# Patient Record
Sex: MALE | URBAN - METROPOLITAN AREA
[De-identification: names, ages, dates, MRNs, and addresses within clinical notes are randomized per-mention and may not be internally consistent; named-entity substitution may affect disease eponyms.]

---

## 2018-11-02 ENCOUNTER — ON CAMPUS - OUTPATIENT (OUTPATIENT)
Dept: URBAN - METROPOLITAN AREA HOSPITAL 77 | Facility: HOSPITAL | Age: 83
End: 2018-11-02

## 2018-11-02 DIAGNOSIS — R07.9 CHEST PAIN, UNSPECIFIED: ICD-10-CM

## 2018-11-02 DIAGNOSIS — D64.89 OTHER SPECIFIED ANEMIAS: ICD-10-CM

## 2018-11-02 DIAGNOSIS — R93.3 ABNORMAL FINDINGS ON DIAGNOSTIC IMAGING OF OTHER PARTS OF DI: ICD-10-CM

## 2018-11-02 DIAGNOSIS — D69.6 THROMBOCYTOPENIA, UNSPECIFIED: ICD-10-CM

## 2018-11-02 PROCEDURE — 99202 OFFICE O/P NEW SF 15 MIN: CPT | Performed by: INTERNAL MEDICINE

## 2023-08-30 ENCOUNTER — APPOINTMENT (OUTPATIENT)
Dept: CT IMAGING | Facility: HOSPITAL | Age: 88
End: 2023-08-30
Payer: MEDICARE

## 2023-08-30 ENCOUNTER — APPOINTMENT (OUTPATIENT)
Dept: GENERAL RADIOLOGY | Facility: HOSPITAL | Age: 88
End: 2023-08-30
Payer: MEDICARE

## 2023-08-30 ENCOUNTER — HOSPITAL ENCOUNTER (OUTPATIENT)
Facility: HOSPITAL | Age: 88
Setting detail: OBSERVATION
Discharge: NURSING, REHAB OR LONG-TERM CARE | End: 2023-09-05
Attending: EMERGENCY MEDICINE | Admitting: INTERNAL MEDICINE
Payer: MEDICARE

## 2023-08-30 DIAGNOSIS — N39.0 URINARY TRACT INFECTION WITH HEMATURIA, SITE UNSPECIFIED: ICD-10-CM

## 2023-08-30 DIAGNOSIS — R55 SYNCOPE, UNSPECIFIED SYNCOPE TYPE: Primary | ICD-10-CM

## 2023-08-30 DIAGNOSIS — R31.9 URINARY TRACT INFECTION WITH HEMATURIA, SITE UNSPECIFIED: ICD-10-CM

## 2023-08-30 DIAGNOSIS — I60.9 SUBARACHNOID HEMORRHAGE: ICD-10-CM

## 2023-08-30 LAB
ALBUMIN SERPL-MCNC: 3.6 G/DL (ref 3.5–5.2)
ALBUMIN/GLOB SERPL: 1.4 G/DL
ALP SERPL-CCNC: 55 U/L (ref 39–117)
ALT SERPL W P-5'-P-CCNC: <5 U/L (ref 1–41)
ANION GAP SERPL CALCULATED.3IONS-SCNC: 9 MMOL/L (ref 5–15)
APTT PPP: 26.5 SECONDS (ref 61–76.5)
AST SERPL-CCNC: 18 U/L (ref 1–40)
BACTERIA UR QL AUTO: ABNORMAL /HPF
BASOPHILS # BLD AUTO: 0 10*3/MM3 (ref 0–0.2)
BASOPHILS NFR BLD AUTO: 0.6 % (ref 0–1.5)
BILIRUB SERPL-MCNC: 0.4 MG/DL (ref 0–1.2)
BILIRUB UR QL STRIP: NEGATIVE
BUN SERPL-MCNC: 29 MG/DL (ref 8–23)
BUN/CREAT SERPL: 27.1 (ref 7–25)
CALCIUM SPEC-SCNC: 8.7 MG/DL (ref 8.2–9.6)
CHLORIDE SERPL-SCNC: 100 MMOL/L (ref 98–107)
CLARITY UR: CLEAR
CO2 SERPL-SCNC: 31 MMOL/L (ref 22–29)
COLOR UR: YELLOW
CREAT SERPL-MCNC: 1.07 MG/DL (ref 0.76–1.27)
DEPRECATED RDW RBC AUTO: 50.8 FL (ref 37–54)
EGFRCR SERPLBLD CKD-EPI 2021: 63.5 ML/MIN/1.73
EOSINOPHIL # BLD AUTO: 0.1 10*3/MM3 (ref 0–0.4)
EOSINOPHIL NFR BLD AUTO: 1.4 % (ref 0.3–6.2)
ERYTHROCYTE [DISTWIDTH] IN BLOOD BY AUTOMATED COUNT: 14.3 % (ref 12.3–15.4)
GEN 5 2HR TROPONIN T REFLEX: 49 NG/L
GLOBULIN UR ELPH-MCNC: 2.6 GM/DL
GLUCOSE SERPL-MCNC: 146 MG/DL (ref 65–99)
GLUCOSE UR STRIP-MCNC: NEGATIVE MG/DL
HCT VFR BLD AUTO: 36 % (ref 37.5–51)
HGB BLD-MCNC: 12.3 G/DL (ref 13–17.7)
HGB UR QL STRIP.AUTO: NEGATIVE
HYALINE CASTS UR QL AUTO: ABNORMAL /LPF
INR PPP: 1.03 (ref 0.93–1.1)
KETONES UR QL STRIP: ABNORMAL
LEUKOCYTE ESTERASE UR QL STRIP.AUTO: ABNORMAL
LYMPHOCYTES # BLD AUTO: 1.2 10*3/MM3 (ref 0.7–3.1)
LYMPHOCYTES NFR BLD AUTO: 16.8 % (ref 19.6–45.3)
MAGNESIUM SERPL-MCNC: 2.1 MG/DL (ref 1.7–2.3)
MCH RBC QN AUTO: 33.1 PG (ref 26.6–33)
MCHC RBC AUTO-ENTMCNC: 34.3 G/DL (ref 31.5–35.7)
MCV RBC AUTO: 96.5 FL (ref 79–97)
MONOCYTES # BLD AUTO: 0.6 10*3/MM3 (ref 0.1–0.9)
MONOCYTES NFR BLD AUTO: 8.5 % (ref 5–12)
NEUTROPHILS NFR BLD AUTO: 5.2 10*3/MM3 (ref 1.7–7)
NEUTROPHILS NFR BLD AUTO: 72.7 % (ref 42.7–76)
NITRITE UR QL STRIP: NEGATIVE
NRBC BLD AUTO-RTO: 0 /100 WBC (ref 0–0.2)
PH UR STRIP.AUTO: <=5 [PH] (ref 5–8)
PLATELET # BLD AUTO: 159 10*3/MM3 (ref 140–450)
PMV BLD AUTO: 8.8 FL (ref 6–12)
POTASSIUM SERPL-SCNC: 3.8 MMOL/L (ref 3.5–5.2)
PROT SERPL-MCNC: 6.2 G/DL (ref 6–8.5)
PROT UR QL STRIP: NEGATIVE
PROTHROMBIN TIME: 11 SECONDS (ref 9.6–11.7)
RBC # BLD AUTO: 3.73 10*6/MM3 (ref 4.14–5.8)
RBC # UR STRIP: ABNORMAL /HPF
REF LAB TEST METHOD: ABNORMAL
SODIUM SERPL-SCNC: 140 MMOL/L (ref 136–145)
SP GR UR STRIP: 1.02 (ref 1–1.03)
SQUAMOUS #/AREA URNS HPF: ABNORMAL /HPF
TROPONIN T DELTA: -3 NG/L
TROPONIN T SERPL HS-MCNC: 52 NG/L
TSH SERPL DL<=0.05 MIU/L-ACNC: 1.72 UIU/ML (ref 0.27–4.2)
UROBILINOGEN UR QL STRIP: ABNORMAL
WBC # UR STRIP: ABNORMAL /HPF
WBC NRBC COR # BLD: 7.2 10*3/MM3 (ref 3.4–10.8)

## 2023-08-30 PROCEDURE — 96365 THER/PROPH/DIAG IV INF INIT: CPT

## 2023-08-30 PROCEDURE — 85730 THROMBOPLASTIN TIME PARTIAL: CPT | Performed by: EMERGENCY MEDICINE

## 2023-08-30 PROCEDURE — 84484 ASSAY OF TROPONIN QUANT: CPT | Performed by: EMERGENCY MEDICINE

## 2023-08-30 PROCEDURE — 83735 ASSAY OF MAGNESIUM: CPT | Performed by: EMERGENCY MEDICINE

## 2023-08-30 PROCEDURE — 87086 URINE CULTURE/COLONY COUNT: CPT | Performed by: EMERGENCY MEDICINE

## 2023-08-30 PROCEDURE — 99284 EMERGENCY DEPT VISIT MOD MDM: CPT

## 2023-08-30 PROCEDURE — 87040 BLOOD CULTURE FOR BACTERIA: CPT | Performed by: EMERGENCY MEDICINE

## 2023-08-30 PROCEDURE — 84443 ASSAY THYROID STIM HORMONE: CPT | Performed by: EMERGENCY MEDICINE

## 2023-08-30 PROCEDURE — 93005 ELECTROCARDIOGRAM TRACING: CPT

## 2023-08-30 PROCEDURE — G0378 HOSPITAL OBSERVATION PER HR: HCPCS

## 2023-08-30 PROCEDURE — 93005 ELECTROCARDIOGRAM TRACING: CPT | Performed by: EMERGENCY MEDICINE

## 2023-08-30 PROCEDURE — 36415 COLL VENOUS BLD VENIPUNCTURE: CPT

## 2023-08-30 PROCEDURE — 85610 PROTHROMBIN TIME: CPT | Performed by: EMERGENCY MEDICINE

## 2023-08-30 PROCEDURE — 81001 URINALYSIS AUTO W/SCOPE: CPT | Performed by: EMERGENCY MEDICINE

## 2023-08-30 PROCEDURE — 85025 COMPLETE CBC W/AUTO DIFF WBC: CPT | Performed by: EMERGENCY MEDICINE

## 2023-08-30 PROCEDURE — 71045 X-RAY EXAM CHEST 1 VIEW: CPT

## 2023-08-30 PROCEDURE — 70450 CT HEAD/BRAIN W/O DYE: CPT

## 2023-08-30 PROCEDURE — 80053 COMPREHEN METABOLIC PANEL: CPT | Performed by: EMERGENCY MEDICINE

## 2023-08-30 PROCEDURE — 25010000002 CEFTRIAXONE PER 250 MG: Performed by: EMERGENCY MEDICINE

## 2023-08-30 RX ORDER — SODIUM CHLORIDE 0.9 % (FLUSH) 0.9 %
10 SYRINGE (ML) INJECTION EVERY 12 HOURS SCHEDULED
Status: DISCONTINUED | OUTPATIENT
Start: 2023-08-30 | End: 2023-09-05 | Stop reason: HOSPADM

## 2023-08-30 RX ORDER — BISACODYL 10 MG
10 SUPPOSITORY, RECTAL RECTAL DAILY PRN
Status: DISCONTINUED | OUTPATIENT
Start: 2023-08-30 | End: 2023-09-05 | Stop reason: HOSPADM

## 2023-08-30 RX ORDER — AMOXICILLIN 250 MG
2 CAPSULE ORAL 2 TIMES DAILY
Status: DISCONTINUED | OUTPATIENT
Start: 2023-08-30 | End: 2023-09-05 | Stop reason: HOSPADM

## 2023-08-30 RX ORDER — CARBOXYMETHYLCELLULOSE SODIUM 5 MG/ML
1 SOLUTION/ DROPS OPHTHALMIC 2 TIMES DAILY
COMMUNITY

## 2023-08-30 RX ORDER — SODIUM CHLORIDE 0.9 % (FLUSH) 0.9 %
10 SYRINGE (ML) INJECTION AS NEEDED
Status: DISCONTINUED | OUTPATIENT
Start: 2023-08-30 | End: 2023-09-05 | Stop reason: HOSPADM

## 2023-08-30 RX ORDER — LEVOTHYROXINE SODIUM 0.07 MG/1
75 TABLET ORAL DAILY
COMMUNITY

## 2023-08-30 RX ORDER — CARBIDOPA AND LEVODOPA 25; 100 MG/1; MG/1
1 TABLET, EXTENDED RELEASE ORAL 3 TIMES DAILY
COMMUNITY

## 2023-08-30 RX ORDER — SODIUM CHLORIDE 9 MG/ML
40 INJECTION, SOLUTION INTRAVENOUS AS NEEDED
Status: DISCONTINUED | OUTPATIENT
Start: 2023-08-30 | End: 2023-09-05 | Stop reason: HOSPADM

## 2023-08-30 RX ORDER — POLYETHYLENE GLYCOL 3350 17 G/17G
17 POWDER, FOR SOLUTION ORAL DAILY PRN
Status: DISCONTINUED | OUTPATIENT
Start: 2023-08-30 | End: 2023-09-05 | Stop reason: HOSPADM

## 2023-08-30 RX ORDER — ROSUVASTATIN CALCIUM 10 MG/1
10 TABLET, COATED ORAL DAILY
COMMUNITY

## 2023-08-30 RX ORDER — NITROGLYCERIN 0.4 MG/1
0.4 TABLET SUBLINGUAL
COMMUNITY

## 2023-08-30 RX ORDER — TAMSULOSIN HYDROCHLORIDE 0.4 MG/1
1 CAPSULE ORAL DAILY
COMMUNITY

## 2023-08-30 RX ORDER — DUTASTERIDE 0.5 MG/1
0.5 CAPSULE, LIQUID FILLED ORAL DAILY
COMMUNITY

## 2023-08-30 RX ORDER — COLESEVELAM 180 1/1
625 TABLET ORAL 2 TIMES DAILY WITH MEALS
COMMUNITY

## 2023-08-30 RX ORDER — ISOSORBIDE MONONITRATE 60 MG/1
60 TABLET, EXTENDED RELEASE ORAL DAILY
COMMUNITY

## 2023-08-30 RX ORDER — METOPROLOL SUCCINATE 50 MG/1
50 TABLET, EXTENDED RELEASE ORAL DAILY
COMMUNITY

## 2023-08-30 RX ORDER — ERYTHROMYCIN 5 MG/G
1 OINTMENT OPHTHALMIC NIGHTLY
COMMUNITY

## 2023-08-30 RX ORDER — BISACODYL 5 MG/1
5 TABLET, DELAYED RELEASE ORAL DAILY PRN
Status: DISCONTINUED | OUTPATIENT
Start: 2023-08-30 | End: 2023-09-05 | Stop reason: HOSPADM

## 2023-08-30 RX ORDER — PIMAVANSERIN TARTRATE 34 MG/1
1 CAPSULE ORAL NIGHTLY
COMMUNITY

## 2023-08-30 RX ORDER — FUROSEMIDE 40 MG/1
40 TABLET ORAL DAILY
COMMUNITY

## 2023-08-30 RX ORDER — NITROGLYCERIN 0.4 MG/1
0.4 TABLET SUBLINGUAL
Status: DISCONTINUED | OUTPATIENT
Start: 2023-08-30 | End: 2023-09-05 | Stop reason: HOSPADM

## 2023-08-30 RX ADMIN — Medication 10 ML: at 23:12

## 2023-08-30 RX ADMIN — CEFTRIAXONE 2000 MG: 2 INJECTION, POWDER, FOR SOLUTION INTRAMUSCULAR; INTRAVENOUS at 19:48

## 2023-08-30 NOTE — ED PROVIDER NOTES
Subjective   History of Present Illness  Chief complaint: Patient is a 89-year-old who came from home.  Per report he was eating lunch.  The caregiver turned around when she turned back to him he was unconscious.  She could not wake him up or revive him.  Patient cannot remember what happened.  He does not remember any feeling of chest pain or palpitations prior to the episode.  He does feel fatigued.  He was transported in route for this unresponsive episode.  Patient states that he has Parkinson's and a 8 coronary stents.    Context:    Duration:    Timing: Acute sudden onset    Severity:    Associated Symptoms:        PCP:  LMP:    Review of Systems   Constitutional:  Positive for fatigue.   Respiratory: Negative.     Cardiovascular: Negative.    Gastrointestinal: Negative.    Musculoskeletal: Negative.    Neurological:  Positive for syncope. Negative for weakness and headaches.     No past medical history on file.    No Known Allergies    No past surgical history on file.    No family history on file.             Objective   Physical Exam  Vitals and nursing note reviewed.   Constitutional:       General: He is not in acute distress.  HENT:      Head: Normocephalic and atraumatic.   Eyes:      Extraocular Movements: Extraocular movements intact.      Pupils: Pupils are equal, round, and reactive to light.   Neck:      Comments: Patient with systolic murmur  Cardiovascular:      Rate and Rhythm: Normal rate.      Heart sounds: Murmur heard.   Pulmonary:      Effort: Pulmonary effort is normal.      Breath sounds: Normal breath sounds.   Abdominal:      Tenderness: There is no abdominal tenderness.   Neurological:      General: No focal deficit present.      Mental Status: He is alert and oriented to person, place, and time.      Cranial Nerves: No cranial nerve deficit.      Sensory: No sensory deficit.      Motor: No weakness.      Coordination: Coordination normal.      Comments: There is some general symmetric  weakness   Psychiatric:         Mood and Affect: Mood normal.       Procedures           ED Course  ED Course as of 08/30/23 2031   Wed Aug 30, 2023   2005 I spoke with  of neurosurgery.  Patient to have repeat study in 4 hours.  He will be admitted for syncope and observed overnight. [LH]      ED Course User Index  [LH] Rashid Ramirezn,            Results for orders placed or performed during the hospital encounter of 08/30/23   Comprehensive Metabolic Panel    Specimen: Blood   Result Value Ref Range    Glucose 146 (H) 65 - 99 mg/dL    BUN 29 (H) 8 - 23 mg/dL    Creatinine 1.07 0.76 - 1.27 mg/dL    Sodium 140 136 - 145 mmol/L    Potassium 3.8 3.5 - 5.2 mmol/L    Chloride 100 98 - 107 mmol/L    CO2 31.0 (H) 22.0 - 29.0 mmol/L    Calcium 8.7 8.2 - 9.6 mg/dL    Total Protein 6.2 6.0 - 8.5 g/dL    Albumin 3.6 3.5 - 5.2 g/dL    ALT (SGPT) <5 1 - 41 U/L    AST (SGOT) 18 1 - 40 U/L    Alkaline Phosphatase 55 39 - 117 U/L    Total Bilirubin 0.4 0.0 - 1.2 mg/dL    Globulin 2.6 gm/dL    A/G Ratio 1.4 g/dL    BUN/Creatinine Ratio 27.1 (H) 7.0 - 25.0    Anion Gap 9.0 5.0 - 15.0 mmol/L    eGFR 63.5 >60.0 mL/min/1.73   Protime-INR    Specimen: Blood   Result Value Ref Range    Protime 11.0 9.6 - 11.7 Seconds    INR 1.03 0.93 - 1.10   aPTT    Specimen: Blood   Result Value Ref Range    PTT 26.5 (L) 61.0 - 76.5 seconds   High Sensitivity Troponin T    Specimen: Blood   Result Value Ref Range    HS Troponin T 52 (H) <15 ng/L   Urinalysis With Culture If Indicated - Urine, Clean Catch    Specimen: Urine, Clean Catch   Result Value Ref Range    Color, UA Yellow Yellow, Straw    Appearance, UA Clear Clear    pH, UA <=5.0 5.0 - 8.0    Specific Gravity, UA 1.016 1.005 - 1.030    Glucose, UA Negative Negative    Ketones, UA Trace (A) Negative    Bilirubin, UA Negative Negative    Blood, UA Negative Negative    Protein, UA Negative Negative    Leuk Esterase, UA Small (1+) (A) Negative    Nitrite, UA Negative Negative     Urobilinogen, UA 0.2 E.U./dL 0.2 - 1.0 E.U./dL   Magnesium    Specimen: Blood   Result Value Ref Range    Magnesium 2.1 1.7 - 2.3 mg/dL   TSH    Specimen: Blood   Result Value Ref Range    TSH 1.720 0.270 - 4.200 uIU/mL   CBC Auto Differential    Specimen: Blood   Result Value Ref Range    WBC 7.20 3.40 - 10.80 10*3/mm3    RBC 3.73 (L) 4.14 - 5.80 10*6/mm3    Hemoglobin 12.3 (L) 13.0 - 17.7 g/dL    Hematocrit 36.0 (L) 37.5 - 51.0 %    MCV 96.5 79.0 - 97.0 fL    MCH 33.1 (H) 26.6 - 33.0 pg    MCHC 34.3 31.5 - 35.7 g/dL    RDW 14.3 12.3 - 15.4 %    RDW-SD 50.8 37.0 - 54.0 fl    MPV 8.8 6.0 - 12.0 fL    Platelets 159 140 - 450 10*3/mm3    Neutrophil % 72.7 42.7 - 76.0 %    Lymphocyte % 16.8 (L) 19.6 - 45.3 %    Monocyte % 8.5 5.0 - 12.0 %    Eosinophil % 1.4 0.3 - 6.2 %    Basophil % 0.6 0.0 - 1.5 %    Neutrophils, Absolute 5.20 1.70 - 7.00 10*3/mm3    Lymphocytes, Absolute 1.20 0.70 - 3.10 10*3/mm3    Monocytes, Absolute 0.60 0.10 - 0.90 10*3/mm3    Eosinophils, Absolute 0.10 0.00 - 0.40 10*3/mm3    Basophils, Absolute 0.00 0.00 - 0.20 10*3/mm3    nRBC 0.0 0.0 - 0.2 /100 WBC   High Sensitivity Troponin T 2Hr    Specimen: Blood   Result Value Ref Range    HS Troponin T 49 (H) <15 ng/L    Troponin T Delta -3 >=-4 - <+4 ng/L   Urinalysis, Microscopic Only - Urine, Clean Catch    Specimen: Urine, Clean Catch   Result Value Ref Range    RBC, UA 0-2 (A) None Seen /HPF    WBC, UA 21-30 (A) None Seen /HPF    Bacteria, UA 4+ (A) None Seen /HPF    Squamous Epithelial Cells, UA 0-2 None Seen, 0-2 /HPF    Hyaline Casts, UA 3-6 None Seen /LPF    Methodology Automated Microscopy    ECG 12 Lead Syncope   Result Value Ref Range    QT Interval 430 ms    QTC Interval 465 ms            CT Head Without Contrast    Result Date: 8/30/2023  Impression: 1.Subtle hyperdensity in the left frontal lobe may represent calcifications, although a trace subarachnoid hemorrhage is not completely excluded. Please consider repeat CT in 4 to 6 hours to  document stability. 2.Low-density fluid along the right frontal convexity, measuring 1.2 cm in thickness, is concerning for an old subdural hemorrhage versus subdural hygroma. Electronically Signed: Chemo Vargas, DO  8/30/2023 7:15 PM EDT  Workstation ID: [id number]    XR Chest 1 View    Result Date: 8/30/2023  Impression: Low lung volumes accentuates pulmonary vasculature and cardiomediastinal silhouette. No definite acute cardiopulmonary abnormality. Electronically Signed: Chemo Vargas, DO  8/30/2023 5:50 PM EDT  Workstation ID: [id number]                              Medical Decision Making  Patient was seen and evaluated for the above problem    Differential diagnose includes but is not limited to UTI, cardiogenic syncope, intracerebral hemorrhage    Patient had CT scan reviewed by myself that does show a questionable area of subarachnoid blood in the left frontal lobe.  No shift or any severe abnormality otherwise.  EKG interpreted by myself sinus rhythm LVH rate of 70.  Patient with no active chest pain.  Initial troponin is 52 and then 49.  There is signs of UTI.  He received antibiotics.  Will withhold on aspirin secondary to the possible subarachnoid hemorrhage.  I spoke with neurosurgery who recommended 4-hour repeat study.  Discussed with Anita for the hospitalist who agrees to admit.  Patient will be admitted in the [facility].  Discussed with patient and family.  They verbalized understanding    Problems Addressed:  Subarachnoid hemorrhage: complicated acute illness or injury  Syncope, unspecified syncope type: complicated acute illness or injury  Urinary tract infection with hematuria, site unspecified: complicated acute illness or injury    Amount and/or Complexity of Data Reviewed  Labs: ordered. Decision-making details documented in ED Course.     Details: Reviewed as above  Radiology: ordered and independent interpretation performed.     Details: Reviewed as above  ECG/medicine tests: ordered and  independent interpretation performed.     Details: Interpreted by myself as above    Risk  Prescription drug management.  Decision regarding hospitalization.        Final diagnoses:   None     Syncope  Subarachnoid hemorrhage  UTI  ED Disposition  ED Disposition       None            No follow-up provider specified.       Medication List      No changes were made to your prescriptions during this visit.            Rashid Ramirez DO  08/30/23 2042

## 2023-08-30 NOTE — Clinical Note
Level of Care: Med/Surg [1]   Admitting Physician: PASCALE VERAS [[id number]]   Attending Physician: PASCALE VERAS [[id number]]   Bed Request Comments: sophia

## 2023-08-31 ENCOUNTER — APPOINTMENT (OUTPATIENT)
Dept: CT IMAGING | Facility: HOSPITAL | Age: 88
End: 2023-08-31
Payer: MEDICARE

## 2023-08-31 LAB
ALBUMIN SERPL-MCNC: 3.6 G/DL (ref 3.5–5.2)
ALBUMIN/GLOB SERPL: 1.4 G/DL
ALP SERPL-CCNC: 52 U/L (ref 39–117)
ALT SERPL W P-5'-P-CCNC: 6 U/L (ref 1–41)
ANION GAP SERPL CALCULATED.3IONS-SCNC: 7 MMOL/L (ref 5–15)
AST SERPL-CCNC: 21 U/L (ref 1–40)
BACTERIA SPEC AEROBE CULT: NO GROWTH
BASOPHILS # BLD AUTO: 0 10*3/MM3 (ref 0–0.2)
BASOPHILS NFR BLD AUTO: 0.4 % (ref 0–1.5)
BILIRUB SERPL-MCNC: 0.5 MG/DL (ref 0–1.2)
BUN SERPL-MCNC: 24 MG/DL (ref 8–23)
BUN/CREAT SERPL: 24 (ref 7–25)
CALCIUM SPEC-SCNC: 9 MG/DL (ref 8.2–9.6)
CHLORIDE SERPL-SCNC: 103 MMOL/L (ref 98–107)
CO2 SERPL-SCNC: 33 MMOL/L (ref 22–29)
CREAT SERPL-MCNC: 1 MG/DL (ref 0.76–1.27)
DEPRECATED RDW RBC AUTO: 49 FL (ref 37–54)
EGFRCR SERPLBLD CKD-EPI 2021: 68.9 ML/MIN/1.73
EOSINOPHIL # BLD AUTO: 0.1 10*3/MM3 (ref 0–0.4)
EOSINOPHIL NFR BLD AUTO: 0.9 % (ref 0.3–6.2)
ERYTHROCYTE [DISTWIDTH] IN BLOOD BY AUTOMATED COUNT: 14.3 % (ref 12.3–15.4)
GLOBULIN UR ELPH-MCNC: 2.5 GM/DL
GLUCOSE SERPL-MCNC: 94 MG/DL (ref 65–99)
HCT VFR BLD AUTO: 38.1 % (ref 37.5–51)
HGB BLD-MCNC: 12.8 G/DL (ref 13–17.7)
LYMPHOCYTES # BLD AUTO: 1.3 10*3/MM3 (ref 0.7–3.1)
LYMPHOCYTES NFR BLD AUTO: 12.8 % (ref 19.6–45.3)
MCH RBC QN AUTO: 33.4 PG (ref 26.6–33)
MCHC RBC AUTO-ENTMCNC: 33.7 G/DL (ref 31.5–35.7)
MCV RBC AUTO: 99 FL (ref 79–97)
MONOCYTES # BLD AUTO: 0.8 10*3/MM3 (ref 0.1–0.9)
MONOCYTES NFR BLD AUTO: 7.7 % (ref 5–12)
NEUTROPHILS NFR BLD AUTO: 7.9 10*3/MM3 (ref 1.7–7)
NEUTROPHILS NFR BLD AUTO: 78.2 % (ref 42.7–76)
NRBC BLD AUTO-RTO: 0 /100 WBC (ref 0–0.2)
PLATELET # BLD AUTO: 161 10*3/MM3 (ref 140–450)
PMV BLD AUTO: 9.2 FL (ref 6–12)
POTASSIUM SERPL-SCNC: 3.8 MMOL/L (ref 3.5–5.2)
PROT SERPL-MCNC: 6.1 G/DL (ref 6–8.5)
QT INTERVAL: 430 MS
QTC INTERVAL: 465 MS
RBC # BLD AUTO: 3.85 10*6/MM3 (ref 4.14–5.8)
SODIUM SERPL-SCNC: 143 MMOL/L (ref 136–145)
TROPONIN T SERPL HS-MCNC: 56 NG/L
WBC NRBC COR # BLD: 10.1 10*3/MM3 (ref 3.4–10.8)

## 2023-08-31 PROCEDURE — G0378 HOSPITAL OBSERVATION PER HR: HCPCS

## 2023-08-31 PROCEDURE — 80053 COMPREHEN METABOLIC PANEL: CPT | Performed by: NURSE PRACTITIONER

## 2023-08-31 PROCEDURE — 97162 PT EVAL MOD COMPLEX 30 MIN: CPT

## 2023-08-31 PROCEDURE — 84484 ASSAY OF TROPONIN QUANT: CPT | Performed by: NURSE PRACTITIONER

## 2023-08-31 PROCEDURE — 70450 CT HEAD/BRAIN W/O DYE: CPT

## 2023-08-31 PROCEDURE — 36415 COLL VENOUS BLD VENIPUNCTURE: CPT | Performed by: NURSE PRACTITIONER

## 2023-08-31 PROCEDURE — 96361 HYDRATE IV INFUSION ADD-ON: CPT

## 2023-08-31 PROCEDURE — 97166 OT EVAL MOD COMPLEX 45 MIN: CPT

## 2023-08-31 PROCEDURE — 25010000002 CEFTRIAXONE PER 250 MG: Performed by: NURSE PRACTITIONER

## 2023-08-31 PROCEDURE — 85025 COMPLETE CBC W/AUTO DIFF WBC: CPT | Performed by: NURSE PRACTITIONER

## 2023-08-31 RX ORDER — CARBIDOPA AND LEVODOPA 25; 100 MG/1; MG/1
1 TABLET, EXTENDED RELEASE ORAL 3 TIMES DAILY
Status: DISCONTINUED | OUTPATIENT
Start: 2023-08-31 | End: 2023-09-05 | Stop reason: HOSPADM

## 2023-08-31 RX ORDER — ISOSORBIDE MONONITRATE 60 MG/1
60 TABLET, EXTENDED RELEASE ORAL DAILY
Status: DISCONTINUED | OUTPATIENT
Start: 2023-08-31 | End: 2023-09-05 | Stop reason: HOSPADM

## 2023-08-31 RX ORDER — ERYTHROMYCIN 5 MG/G
1 OINTMENT OPHTHALMIC NIGHTLY
Status: DISCONTINUED | OUTPATIENT
Start: 2023-08-31 | End: 2023-09-05 | Stop reason: HOSPADM

## 2023-08-31 RX ORDER — ROSUVASTATIN CALCIUM 10 MG/1
10 TABLET, COATED ORAL DAILY
Status: DISCONTINUED | OUTPATIENT
Start: 2023-08-31 | End: 2023-09-05 | Stop reason: HOSPADM

## 2023-08-31 RX ORDER — NITROGLYCERIN 0.4 MG/1
0.4 TABLET SUBLINGUAL
Status: DISCONTINUED | OUTPATIENT
Start: 2023-08-31 | End: 2023-08-31 | Stop reason: SDUPTHER

## 2023-08-31 RX ORDER — LEVOTHYROXINE SODIUM 0.07 MG/1
75 TABLET ORAL
Status: DISCONTINUED | OUTPATIENT
Start: 2023-08-31 | End: 2023-09-05 | Stop reason: HOSPADM

## 2023-08-31 RX ORDER — FINASTERIDE 5 MG/1
5 TABLET, FILM COATED ORAL DAILY
Status: DISCONTINUED | OUTPATIENT
Start: 2023-08-31 | End: 2023-09-05 | Stop reason: HOSPADM

## 2023-08-31 RX ORDER — CHOLESTYRAMINE LIGHT 4 G/5.7G
1 POWDER, FOR SUSPENSION ORAL DAILY
Status: DISCONTINUED | OUTPATIENT
Start: 2023-08-31 | End: 2023-09-05 | Stop reason: HOSPADM

## 2023-08-31 RX ORDER — METOPROLOL SUCCINATE 50 MG/1
50 TABLET, EXTENDED RELEASE ORAL DAILY
Status: DISCONTINUED | OUTPATIENT
Start: 2023-08-31 | End: 2023-09-05 | Stop reason: HOSPADM

## 2023-08-31 RX ORDER — TAMSULOSIN HYDROCHLORIDE 0.4 MG/1
0.4 CAPSULE ORAL DAILY
Status: DISCONTINUED | OUTPATIENT
Start: 2023-08-31 | End: 2023-09-05 | Stop reason: HOSPADM

## 2023-08-31 RX ORDER — CARBOXYMETHYLCELLULOSE SODIUM 10 MG/ML
2 GEL OPHTHALMIC 4 TIMES DAILY PRN
Status: DISCONTINUED | OUTPATIENT
Start: 2023-08-31 | End: 2023-09-05 | Stop reason: HOSPADM

## 2023-08-31 RX ORDER — SODIUM CHLORIDE 9 MG/ML
75 INJECTION, SOLUTION INTRAVENOUS CONTINUOUS
Status: DISPENSED | OUTPATIENT
Start: 2023-08-31 | End: 2023-09-01

## 2023-08-31 RX ADMIN — Medication 10 ML: at 22:31

## 2023-08-31 RX ADMIN — SODIUM CHLORIDE 75 ML/HR: 9 INJECTION, SOLUTION INTRAVENOUS at 13:27

## 2023-08-31 RX ADMIN — ERYTHROMYCIN 1 APPLICATION: 5 OINTMENT OPHTHALMIC at 21:46

## 2023-08-31 RX ADMIN — CEFTRIAXONE 2000 MG: 2 INJECTION, POWDER, FOR SOLUTION INTRAMUSCULAR; INTRAVENOUS at 21:46

## 2023-08-31 RX ADMIN — CARBIDOPA AND LEVODOPA 1 TABLET: 25; 100 TABLET, EXTENDED RELEASE ORAL at 21:46

## 2023-08-31 RX ADMIN — SENNOSIDES AND DOCUSATE SODIUM 2 TABLET: 50; 8.6 TABLET ORAL at 21:46

## 2023-08-31 RX ADMIN — LEVOTHYROXINE SODIUM 75 MCG: 0.12 TABLET ORAL at 06:02

## 2023-08-31 NOTE — THERAPY EVALUATION
Patient Name: Jermaine Black  : 1926    MRN: [id number]                              Today's Date: 2023       Admit Date: 2023    Visit Dx:     ICD-10-CM ICD-9-CM   1. Syncope, unspecified syncope type  R55 780.2   2. Subarachnoid hemorrhage  I60.9 430   3. Urinary tract infection with hematuria, site unspecified  N39.0 599.0    R31.9 599.70     Patient Active Problem List   Diagnosis    Syncope     History reviewed. No pertinent past medical history.  History reviewed. No pertinent surgical history.   General Information       Row Name 23 1253          Physical Therapy Time and Intention    Document Type evaluation  -MB     Mode of Treatment physical therapy  -MB       Row Name 23 1253          General Information    Patient Profile Reviewed yes  -MB     Prior Level of Function independent:;all household mobility;community mobility;gait;transfer;ADL's;mod assist:;home management  -MB     Existing Precautions/Restrictions fall  -MB     Barriers to Rehab medically complex  -MB       Row Name 23 1253          Living Environment    People in Home spouse  -MB       Row Name 23 1253          Home Main Entrance    Number of Stairs, Main Entrance two  -MB       Row Name 23 1253          Stairs Within Home, Primary    Number of Stairs, Within Home, Primary none  -MB       Row Name 23 1253          Cognition    Orientation Status (Cognition) oriented x 4  -MB       Row Name 23 1253          Safety Issues, Functional Mobility    Impairments Affecting Function (Mobility) balance;endurance/activity tolerance;motor control;shortness of breath;strength;postural/trunk control  -MB               User Key  (r) = Recorded By, (t) = Taken By, (c) = Cosigned By      Initials Name Provider Type    Servando Francisco, PT Physical Therapist                   Mobility       Row Name 23 1254          Bed Mobility    Bed Mobility bed mobility (all) activities  -MB     All  Activities, [place] (Bed Mobility) moderate assist (50% patient effort);2 person assist  -MB     Assistive Device (Bed Mobility) bed rails;head of bed elevated  -MB       Row Name 08/31/23 1254          Sit-Stand Transfer    Sit-Stand [place] (Transfers) moderate assist (50% patient effort)  -MB     Assistive Device (Sit-Stand Transfers) walker, front-wheeled  -MB       Row Name 08/31/23 1254          Gait/Stairs (Locomotion)    [place] Level (Gait) minimum assist (75% patient effort)  -MB     Assistive Device (Gait) walker, front-wheeled  -MB     Distance in Feet (Gait) 5  -MB     Deviations/Abnormal Patterns (Gait) jeannine decreased;festinating/shuffling;gait speed decreased;stride length decreased;weight shifting decreased  -MB     Comment, (Gait/Stairs) 5' min>mod A with RW, shuffling gait with no decreased step length and jeannine  -MB               User Key  (r) = Recorded By, (t) = Taken By, (c) = Cosigned By      Initials Name Provider Type    MB Servando Hernandez, PT Physical Therapist                   Obj/Interventions       Row Name 08/31/23 1257          Range of Motion Comprehensive    General Range of Motion no range of motion deficits identified  -MB       Row Name 08/31/23 1257          Strength Comprehensive (MMT)    Comment, General Manual Muscle Testing (MMT) Assessment BLE Strength 3+/5 grossly  -MB       Row Name 08/31/23 1257          Balance    Balance Assessment sitting static balance;sitting dynamic balance;sit to stand dynamic balance;standing static balance;standing dynamic balance  -MB     Static Sitting Balance independent  -MB     Dynamic Sitting Balance independent  -MB     Position, Sitting Balance supported;sitting edge of bed  -MB     Sit to Stand Dynamic Balance contact guard  -MB     Static Standing Balance contact guard  -MB     Dynamic Standing Balance minimal assist;moderate assist  -MB       Row Name 08/31/23 1257          Sensory Assessment (Somatosensory)     Sensory Assessment (Somatosensory) sensation intact  -MB               User Key  (r) = Recorded By, (t) = Taken By, (c) = Cosigned By      Initials Name Provider Type    Servando Francisco, PT Physical Therapist                   Goals/Plan       Row Name 08/31/23 1259          Bed Mobility Goal 1 (PT)    Activity/Assistive Device (Bed Mobility Goal 1, PT) bed mobility activities, all  -MB     [place] Level/Cues Needed (Bed Mobility Goal 1, PT) independent  -MB     Time Frame (Bed Mobility Goal 1, PT) long term goal (LTG);2 weeks  -MB       Row Name 08/31/23 1259          Transfer Goal 1 (PT)    Activity/Assistive Device (Transfer Goal 1, PT) transfers, all;walker, rolling  -MB     [place] Level/Cues Needed (Transfer Goal 1, PT) contact guard required  -MB     Time Frame (Transfer Goal 1, PT) long term goal (LTG);2 weeks  -MB       Row Name 08/31/23 1259          Gait Training Goal 1 (PT)    Activity/Assistive Device (Gait Training Goal 1, PT) gait (walking locomotion);walker, rolling  -MB     [place] Level (Gait Training Goal 1, PT) contact guard required  -MB     Distance (Gait Training Goal 1, PT) 25  -MB     Time Frame (Gait Training Goal 1, PT) long term goal (LTG);2 weeks  -MB       Row Name 08/31/23 1259          Therapy Assessment/Plan (PT)    Planned Therapy Interventions (PT) balance training;bed mobility training;gait training;home exercise program;neuromuscular re-education;patient/family education;strengthening;transfer training  -MB               User Key  (r) = Recorded By, (t) = Taken By, (c) = Cosigned By      Initials Name Provider Type    Servando Francisco, PT Physical Therapist                   Clinical Impression       Row Name 08/31/23 1258          Pain    Pretreatment Pain Rating 0/10 - no pain  -MB     Posttreatment Pain Rating 0/10 - no pain  -MB       Row Name 08/31/23 1312 08/31/23 1258       Plan of Care Review    Plan of Care Reviewed With -- patient  -MB    Progress -- no  change  -MB    Outcome Evaluation Pt is a 89 y/o M admitted to [facility] on 8/30/23 following a syncopal episode in which his caregiver found pt unconscious when eating lunch. He was unable to be aroused and and does not remember the incident. Pt was found to have signs of UTI and possible subarachnoid hemorrhage with CT Head (+) for parenchymal calcifications with potential SAH, still req further CT imaging. PMH includes Parkinson's Disease, CAD, hypothyroidism, HLD, HTN, and CHF. He is currently living at home with spouse in [facility] with two step entry. He reports a caregiver comes to the house throughout the week and pt daughter provides transportation for the community. At baseline, pt reports IND with household mobility and ADLs with use of RW. At time of eval pt is sitting up in bed and is AAOx4. He completes bed mobility mod A x 2, transfers mod A, and amb 5' min>mod A. During gait assessment, pt req use of urinal. While holding urinal in standing, significant tremor was noted and pt began drooling from the mouth. Cognitive change was noted and pt was unable to follow commands correctly. He req mod>max A to help reach the EOB where symptoms improved and pt symptoms resided. Orthostatic vital signs would be beneficial to test next session to rule in/out. He is currently not at IND baseline functioning, and will req skilled Pt intervention during stay. Current recommendation is IRF at d/c for quick, intensive rehab to reach IND baseline and return back home with wife.  -MB --      Row Name 08/31/23 1258          Therapy Assessment/Plan (PT)    Rehab Potential (PT) fair, will monitor progress closely  -MB     Criteria for Skilled Interventions Met (PT) yes;meets criteria  -MB     Therapy Frequency (PT) 5 times/wk  -MB     Predicted Duration of Therapy Intervention (PT) until d/c  -MB       Row Name 08/31/23 1258          Vital Signs    Pretreatment Heart Rate (beats/min) 71  -MB     Posttreatment Heart Rate (beats/min) 78   -MB     Pre SpO2 (%) 97  -MB     O2 Delivery Pre Treatment room air  -MB     O2 Delivery Intra Treatment room air  -MB     Post SpO2 (%) 96  -MB     O2 Delivery Post Treatment room air  -MB     Pre Patient Position Supine  -MB     Intra Patient Position Standing  -MB     Post Patient Position Supine  -MB       Row Name 08/31/23 1258          Positioning and Restraints    Pre-Treatment Position in bed  -MB     Post Treatment Position bed  -MB     In Bed notified nsg;supine;call light within reach;encouraged to call for assist  -MB               User Key  (r) = Recorded By, (t) = Taken By, (c) = Cosigned By      Initials Name Provider Type    Servando Francisco, PT Physical Therapist                   Outcome Measures       Row Name 08/31/23 1300          How much help from another person do you currently need...    Turning from your back to your side while in flat bed without using bedrails? 2  -MB     Moving from lying on back to sitting on the side of a flat bed without bedrails? 2  -MB     Moving to and from a bed to a chair (including a wheelchair)? 2  -MB     Standing up from a chair using your arms (e.g., wheelchair, bedside chair)? 2  -MB     Climbing 3-5 steps with a railing? 2  -MB     To walk in hospital room? 2  -MB     AM-PAC 6 Clicks Score (PT) 12  -MB     Highest level of mobility 4 --> Transferred to chair/commode  -MB       Row Name 08/31/23 1300          Functional Assessment    Outcome Measure Options AM-PAC 6 Clicks Basic Mobility (PT)  -MB               User Key  (r) = Recorded By, (t) = Taken By, (c) = Cosigned By      Initials Name Provider Type    Servando Francisco, CADY Physical Therapist                                 Physical Therapy Education       Title: PT OT SLP Therapies (Done)       Topic: Physical Therapy (Done)       Point: Mobility training (Done)       Learning Progress Summary             Patient Acceptance, E,TB, VU by MB at 8/31/2023 1303                         Point: Body mechanics  (Done)       Learning Progress Summary             Patient Acceptance, E,TB, VU by MB at 8/31/2023 1303                         Point: Precautions (Done)       Learning Progress Summary             Patient Acceptance, E,TB, VU by MB at 8/31/2023 1303                                         User Key       Initials Effective Dates Name Provider Type Discipline    MB 06/06/23 -  Servando Hernandez, PT Physical Therapist PT                  PT Recommendation and Plan  Planned Therapy Interventions (PT): balance training, bed mobility training, gait training, home exercise program, neuromuscular re-education, patient/family education, strengthening, transfer training  Plan of Care Reviewed With: patient  Progress: no change  Outcome Evaluation: Pt is a 89 y/o M admitted to [facility] on 8/30/23 following a syncopal episode in which his caregiver found pt unconscious when eating lunch. He was unable to be aroused and and does not remember the incident. Pt was found to have signs of UTI and possible subarachnoid hemorrhage with CT Head (+) for parenchymal calcifications with potential SAH, still req further CT imaging. PMH includes Parkinson's Disease, CAD, hypothyroidism, HLD, HTN, and CHF. He is currently living at home with spouse in [facility] with two step entry. He reports a caregiver comes to the house throughout the week and pt daughter provides transportation for the community. At baseline, pt reports IND with household mobility and ADLs with use of RW. At time of eval pt is sitting up in bed and is AAOx4. He completes bed mobility mod A x 2, transfers mod A, and amb 5' min>mod A. During gait assessment, pt req use of urinal. While holding urinal in standing, significant tremor was noted and pt began drooling from the mouth. Cognitive change was noted and pt was unable to follow commands correctly. He req mod>max A to help reach the EOB where symptoms improved and pt symptoms resided. Orthostatic vital signs would be beneficial to  test next session to rule in/out. He is currently not at IND baseline functioning, and will req skilled Pt intervention during stay. Current recommendation is IRF at d/c for quick, intensive rehab to reach IND baseline and return back home with wife.     Time Calculation:   PT Evaluation Complexity  History, PT Evaluation Complexity: 1-2 personal factors and/or comorbidities  Examination of Body Systems (PT Eval Complexity): total of 3 or more elements  Clinical Presentation (PT Evaluation Complexity): evolving  Clinical Decision Making (PT Evaluation Complexity): moderate complexity  Overall Complexity (PT Evaluation Complexity): moderate complexity     PT Charges       Row Name 08/31/23 1304             Time Calculation    Start Time 1017  -MB      Stop Time 1051  -MB      Time Calculation (min) 34 min  -MB      PT Received On 08/31/23  -MB      PT - Next Appointment 09/01/23  -MB      PT Goal Re-Cert Due Date 09/14/23  -MB         Time Calculation- PT    Total Timed Code Minutes- PT 0 minute(s)  -MB                User Key  (r) = Recorded By, (t) = Taken By, (c) = Cosigned By      Initials Name Provider Type    Servando Francisco, PT Physical Therapist                  Therapy Charges for Today       Code Description Service Date Service Provider Modifiers Qty    [id number] HC PT EVAL MOD COMPLEXITY 4 8/31/2023 Servando Hernandez, PT GP 1            PT G-Codes  Outcome Measure Options: AM-PAC 6 Clicks Basic Mobility (PT)  AM-PAC 6 Clicks Score (PT): 12  PT Discharge Summary  Anticipated Discharge Disposition (PT): inpatient rehabilitation facility    Servando Hernandez PT  8/31/2023

## 2023-08-31 NOTE — PROGRESS NOTES
[facility] Medicine Services   Daily Progress Note    Patient Name: Jermaine Black  : 1926  MRN: [id number]  Primary Care Physician:  Provider, No Known  Date of admission: 2023  Date and Time of Service: 2023 at 0925.      Subjective      Chief Complaint: Patient came in with an syncopal episode.    Patient Reports patient is complaining of generalized weakness but denies any other complaints at this point.    Review of Systems   Constitutional: Negative for chills, fever and night sweats.   HENT:  Negative for congestion, hoarse voice and sore throat.    Eyes:  Negative for blurred vision and double vision.   Cardiovascular:  Positive for syncope. Negative for chest pain, dyspnea on exertion, leg swelling, near-syncope, orthopnea and palpitations.   Respiratory:  Negative for cough, shortness of breath, sputum production and wheezing.    Endocrine: Negative for cold intolerance and heat intolerance.   Skin:  Negative for itching and rash.   Musculoskeletal:  Negative for arthritis, back pain, falls and neck pain.   Gastrointestinal:  Negative for abdominal pain, constipation, diarrhea, hematemesis, hematochezia, melena, nausea and vomiting.   Genitourinary:  Negative for dysuria, frequency, hematuria and urgency.   Neurological:  Positive for weakness. Negative for excessive daytime sleepiness, dizziness, focal weakness, headaches, light-headedness, tremors and vertigo.   Psychiatric/Behavioral:  Negative for altered mental status, depression and hallucinations.           Objective      Vitals:   Temp:  [97.4 °F (36.3 °C)-98.2 °F (36.8 °C)] 97.4 °F (36.3 °C)  Heart Rate:  [70-80] 70  Resp:  [16-18] 17  BP: (144-176)/(62-88) 153/80    Physical Exam  Constitutional:       General: He is not in acute distress.  HENT:      Head: Normocephalic and atraumatic.      Mouth/Throat:      Mouth: Mucous membranes are dry.      Pharynx: Oropharynx is clear.   Eyes:      Extraocular Movements:  Extraocular movements intact.   Cardiovascular:      Rate and Rhythm: Normal rate and regular rhythm.      Pulses: Normal pulses.      Heart sounds: Murmur heard.      Comments: Patient has a 2/6 systolic murmur at the left lower sternal border.  Pulmonary:      Effort: Pulmonary effort is normal.      Breath sounds: Normal breath sounds.   Abdominal:      Palpations: Abdomen is soft.      Tenderness: There is no abdominal tenderness. There is no rebound.   Musculoskeletal:      Right lower leg: No edema.      Left lower leg: No edema.   Skin:     General: Skin is warm and dry.   Neurological:      General: No focal deficit present.      Mental Status: He is alert.   Psychiatric:         Mood and Affect: Mood normal.         Behavior: Behavior normal.             Result Review    Result Review:  I have personally reviewed the results from the time of this admission to 8/31/2023 09:24 EDT and agree with these findings:  [x]  Laboratory  []  Microbiology  [x]  Radiology  []  EKG/Telemetry   []  Cardiology/Vascular   []  Pathology  []  Old records  []  Other:  Most notable findings include:   CBC shows WBC of 10.1, H&H of 12.8 and 38.1 and platelets of 161.  CMP shows sodium of 143, potassium 3.8, chloride 103, carbon dioxide 33, BUN 24, creatinine 1.2 and glucose of 94.  LFTs were within normal limits.  Troponin was initially 49 and increased to 56.    CT of the head without contrast:  1. Stable 3-4 mm subtle hyperdensity adjacent to the left superior frontal gyrus. In the absence of trauma, this may represent a parenchymal calcification. Otherwise, this still could represent a small focus of subarachnoid hemorrhage. There do not   appear to be significant external signs of trauma to the head. Consider 24-hour follow-up head CT.  2. Moderate chronic small vessel ischemic change and moderate generalized parenchymal volume loss.      Assessment & Plan      Brief Patient Summary:  Jermaine Black is a 89 y.o. male who  came in with an episode of syncope and is found to have some abnormality on the CT of the head.      carbidopa-levodopa ER, 1 tablet, Oral, TID  cefTRIAXone, 2,000 mg, Intravenous, Q24H  cholestyramine light, 1 packet, Oral, Daily  erythromycin, 1 application , Both Eyes, Nightly  finasteride, 5 mg, Oral, Daily  isosorbide mononitrate, 60 mg, Oral, Daily  levothyroxine, 75 mcg, Oral, Q AM  metoprolol succinate XL, 50 mg, Oral, Daily  rosuvastatin, 10 mg, Oral, Daily  senna-docusate sodium, 2 tablet, Oral, BID  sodium chloride, 10 mL, Intravenous, Q12H  tamsulosin, 0.4 mg, Oral, Daily       sodium chloride, 75 mL/hr         Active Hospital Problems:  Active Hospital Problems    Diagnosis     **Syncope      Plan:   #Brief episode of unconsciousness, syncopal event  - pt now back to baseline  - neg for fall   - Likely secondary to some dehydration due to his diuresis.  - rule out neurological such as TIA or seizure  - Initial CT concerning for SAH. Repeat CT stable but still with questionable area  -Repeat CT of the head without contrast at 6 PM this evening.  - neurology consulted and case was discussed by ED physician   - We will consult neurosurgery as well for possible SAH.  - VSS  - troponin 52-> 49  - Electrolytes WNL  - No leukocytosis  - cultures pending  - PT/OT    #Possible dehydration:  -Lasix is on hold.  -Give gentle hydration for liter of fluid.  -Check orthostatic blood pressure and pulse.  -PT eval.     #Mild UTI   - continue rocephin till tomorrow and then stop.  - follow cultures     Hypothyroid  - continue synthroid     CAD s/p multiple stents/ HLD  -Troponin slightly elevated on the last reading but patient without any chest pain shortness of breath or any other signs and symptoms of acute coronary syndrome.  - continue imdur, toprol, crestor  - will hold lasix for possible volume depletion            DVT prophylaxis:  Mechanical DVT prophylaxis orders are present.     CODE STATUS:    Medical  Intervention Limits: NO intubation (DNI)  Code Status (Patient has no pulse and is not breathing): No CPR (Do Not Attempt to Resuscitate)  Medical Interventions (Patient has pulse or is breathing): Limited Support       DVT prophylaxis:  Mechanical DVT prophylaxis orders are present.    CODE STATUS:    Medical Intervention Limits: NO intubation (DNI)  Code Status (Patient has no pulse and is not breathing): No CPR (Do Not Attempt to Resuscitate)  Medical Interventions (Patient has pulse or is breathing): Limited Support      Disposition:  I expect patient to be discharged tomorrow.      Electronically signed by Missy Carlson MD, 08/31/23, 09:24 EDT.  [facility] Hospitalist Team

## 2023-08-31 NOTE — PLAN OF CARE
Goal Outcome Evaluation:  Plan of Care Reviewed With: patient        Progress: no change   Pt is a 89 y/o M admitted to [facility] on 8/30/23 following a syncopal episode in which his caregiver found pt unconscious when eating lunch. He was unable to be aroused and and does not remember the incident. Pt was found to have signs of UTI and possible subarachnoid hemorrhage with CT Head (+) for parenchymal calcifications with potential SAH, still req further CT imaging. PMH includes Parkinson's Disease, CAD, hypothyroidism, HLD, HTN, and CHF. He is currently living at home with spouse in [facility] with two step entry. He reports a caregiver comes to the house throughout the week and pt daughter provides transportation for the community. At baseline, pt reports IND with household mobility and ADLs with use of RW. At time of eval pt is sitting up in bed and is AAOx4. He completes bed mobility mod A x 2, transfers mod A, and amb 5' min>mod A. During gait assessment, pt req use of urinal. While holding urinal in standing, significant tremor was noted and pt began drooling from the mouth. Cognitive change was noted and pt was unable to follow commands correctly. He req mod>max A to help reach the EOB where symptoms improved and pt symptoms resided. Orthostatic vital signs would be beneficial to test next session to rule in/out. He is currently not at IND baseline functioning, and will req skilled Pt intervention during stay. Current recommendation is IRF at d/c for quick, intensive rehab to reach IND baseline and return back home with wife.    Anticipated Discharge Disposition (PT): inpatient rehabilitation facility

## 2023-08-31 NOTE — DISCHARGE PLACEMENT REQUEST
"Jermaine Black (89 y.o. Male)       Date of Birth   11/30/1926    Social Security Number       Address   [address] [place] [address]    Home Phone   [phone]    MRN   [id number]       Jain   None    Marital Status                               Admission Date   8/30/23    Admission Type   Emergency    Admitting Provider   Genna Perez MD    Attending Provider   Missy Carlson MD    Department, Room/Bed   [facility] EMERGENCY DEPARTMENT, 25/25       Discharge Date       Discharge Disposition       Discharge Destination                                 Attending Provider: Missy Carlson MD    Allergies: No Known Allergies    Isolation: None   Infection: None   Code Status: No CPR    Ht: 167.6 cm (66\")   Wt: 86.2 kg (190 lb)    Admission Cmt: None   Principal Problem: Syncope [R55]                   Active Insurance as of 8/30/2023       Primary Coverage       Payor Plan Insurance Group Employer/Plan Group    AETNA MEDICARE REPLACEMENT AETNA MEDICARE REPLACEMENT [id number]       Payor Plan Address Payor Plan Phone Number Payor Plan Fax Number Effective Dates    [address] [phone]  1/1/2022 - None Entered    [facility] [id number]         Subscriber Name Subscriber Birth Date Member ID       JERMAINE BLACK 11/30/1926 [id number]                     Emergency Contacts        (Rel.) Home Phone Work Phone Mobile Phone    Wayne-Rachel Valenzuela SIN (Daughter) -- -- [phone]    Gerri Walker (Daughter) -- -- [phone]    Skylar Rutledge (Daughter) [phone] -- --                "

## 2023-08-31 NOTE — H&P
[facility] Medicine Services  History & Physical    Patient Name: Jermaine Black  : 1926  MRN: [id number]  Primary Care Physician:  Maria De Jesus, No Known  Date of admission: 2023  Date and Time of Service: 23 at 2100    Subjective      Chief Complaint: syncope    History of Present Illness: Jermaine Black is a 89 y.o. male with pmh hypothyroidism, Parkinson's, coronary artery disease, hyperlipidemia, GERD, BPH, hypertension, congestive heart failure who presented to [facility] on 2023 following a syncopal event.    Family not present at bedside.  Patient had been accompanied to the ED by roxanne.  Patient reports resides with his spouse.  Patient alert but poor historian.  Reports does not recall events leading up to hospitalization.  Her ED physician patient was admitted from home.  While eating lunch caregiver turned around and when she turned back he was unconscious.  Reportedly unable to arouse and EMS called.     In the ED patient was found to have signs of UTI.  CT scan did show a questionable area of subarachnoid blood in the left frontal lobe.  Repeat scan shows a stable 3 to 4 mm subtle hyperdensity adjacent to the left superior frontal gyrus.  In the absence of trauma may represent parenchymal calcification.  Otherwise this could still represent a small focus of subarachnoid hemorrhage.  Consider 24-hour follow-up CT head.  Neurology is following.  EKG interpreted by ED physician sinus rhythm LVH rate of 70.  No chest pain.  Initial troponin was 52 then 49.  Aspirin was placed on hold for possible subarachnoid hemorrhage.  Patient is now alert with no concerns.  Denies headache visual changes chest pain shortness of breath abdominal pain nausea or vomiting.  Been admitted for further evaluation  .    Review of Systems   All other systems reviewed and are negative.     Personal History     History reviewed. No pertinent past medical history.    History  reviewed. No pertinent surgical history.    Family History: family history is not on file. Otherwise pertinent FHx was reviewed and not pertinent to current issue.    Social History:  reports that he has never smoked. He has never used smokeless tobacco. He reports that he does not drink alcohol and does not use drugs.    Home Medications:  Prior to Admission Medications       Prescriptions Last Dose Informant Patient Reported? Taking?    carbidopa-levodopa ER (SINEMET CR)  MG per tablet   Yes Yes    Take 1 tablet by mouth 3 (Three) Times a Day.    carboxymethylcellulose (REFRESH PLUS) 0.5 % solution   Yes Yes    Administer 1 drop to both eyes 2 (Two) Times a Day.    colesevelam (WELCHOL) 625 MG tablet   Yes Yes    Take 1 tablet by mouth 2 (Two) Times a Day With Meals.    dutasteride (AVODART) 0.5 MG capsule   Yes Yes    Take 1 capsule by mouth Daily.    erythromycin (ROMYCIN) 5 MG/GM ophthalmic ointment   Yes Yes    Administer 1 application  to both eyes Every Night.    furosemide (LASIX) 40 MG tablet   Yes Yes    Take 1 tablet by mouth Daily.    isosorbide mononitrate (IMDUR) 60 MG 24 hr tablet   Yes Yes    Take 1 tablet by mouth Daily.    levothyroxine (SYNTHROID, LEVOTHROID) 75 MCG tablet   Yes Yes    Take 1 tablet by mouth Daily.    metoprolol succinate XL (TOPROL-XL) 50 MG 24 hr tablet   Yes Yes    Take 1 tablet by mouth Daily.    nitroglycerin (NITROSTAT) 0.4 MG SL tablet   Yes Yes    Place 1 tablet under the tongue Every 5 (Five) Minutes As Needed for Chest Pain. Take no more than 3 doses in 15 minutes.    Pimavanserin Tartrate (Nuplazid) 34 MG capsule   Yes Yes    Take 1 capsule by mouth Every Night.    rosuvastatin (CRESTOR) 10 MG tablet   Yes Yes    Take 1 tablet by mouth Daily.    tamsulosin (FLOMAX) 0.4 MG capsule 24 hr capsule   Yes Yes    Take 1 capsule by mouth Daily.              Allergies:  No Known Allergies    Objective      Vitals:   Temp:  [98 °F (36.7 °C)-98.2 °F (36.8 °C)] 98 °F (36.7  °C)  Heart Rate:  [70-80] 70  Resp:  [16-18] 16  BP: (144-176)/(62-88) 145/77    Physical Exam  Eyes:      Comments: Left eye red and weepy   Cardiovascular:      Rate and Rhythm: Normal rate and regular rhythm.   Pulmonary:      Effort: Pulmonary effort is normal.      Breath sounds: Normal breath sounds.   Abdominal:      General: Abdomen is flat.      Palpations: Abdomen is soft.   Musculoskeletal:         General: Normal range of motion.   Skin:     General: Skin is warm and dry.   Neurological:      Mental Status: He is alert and oriented to person, place, and time.   Psychiatric:         Behavior: Behavior normal.        Result Review    Result Review:  I have personally reviewed the results from the time of this admission to 8/31/2023 02:55 EDT and agree with these findings:  [x]  Laboratory  [x]  Microbiology  [x]  Radiology  [x]  EKG/Telemetry   []  Cardiology/Vascular   []  Pathology  []  Old records  []  Other:  Most notable findings include: CT Head Without Contrast    Result Date: 8/31/2023  Impression: 1. Stable 3-4 mm subtle hyperdensity adjacent to the left superior frontal gyrus. In the absence of trauma, this may represent a parenchymal calcification. Otherwise, this still could represent a small focus of subarachnoid hemorrhage. There do not appear to be significant external signs of trauma to the head. Consider 24-hour follow-up head CT. 2. Moderate chronic small vessel ischemic change and moderate generalized parenchymal volume loss. Electronically Signed: Erik Baer MD  8/31/2023 12:18 AM EDT  Workstation ID: [id number]    CT Head Without Contrast    Result Date: 8/30/2023  Impression: 1.Subtle hyperdensity in the left frontal lobe may represent calcifications, although a trace subarachnoid hemorrhage is not completely excluded. Please consider repeat CT in 4 to 6 hours to document stability. 2.Low-density fluid along the right frontal convexity, measuring 1.2 cm in thickness, is concerning  for an old subdural hemorrhage versus subdural hygroma. Electronically Signed: Chemo Vargas, DO  8/30/2023 7:15 PM EDT  Workstation ID: [id number]    XR Chest 1 View    Result Date: 8/30/2023  Impression: Low lung volumes accentuates pulmonary vasculature and cardiomediastinal silhouette. No definite acute cardiopulmonary abnormality. Electronically Signed: Chemo Vargas, DO  8/30/2023 5:50 PM EDT  Workstation ID: [id number]      ECG 12 Lead Syncope   Preliminary Result   HEART RATE= 70  bpm   RR Interval= 856  ms   KS Interval= 188  ms   P Horizontal Axis= -63  deg   P Front Axis= 25  deg   QRSD Interval= 96  ms   QT Interval= 430  ms   QTcB= 465  ms   QRS Axis= -27  deg   T Wave Axis= -14  deg   - ABNORMAL ECG -   Sinus rhythm   Left ventricular hypertrophy   Inferior infarct, old   Electronically Signed By:    Date and Time of Study: 2023-08-30 16:51:03         WBC   Date Value Ref Range Status   08/30/2023 7.20 3.40 - 10.80 10*3/mm3 Final     RBC   Date Value Ref Range Status   08/30/2023 3.73 (L) 4.14 - 5.80 10*6/mm3 Final     Hemoglobin   Date Value Ref Range Status   08/30/2023 12.3 (L) 13.0 - 17.7 g/dL Final     Hematocrit   Date Value Ref Range Status   08/30/2023 36.0 (L) 37.5 - 51.0 % Final     MCV   Date Value Ref Range Status   08/30/2023 96.5 79.0 - 97.0 fL Final     MCH   Date Value Ref Range Status   08/30/2023 33.1 (H) 26.6 - 33.0 pg Final     MCHC   Date Value Ref Range Status   08/30/2023 34.3 31.5 - 35.7 g/dL Final     RDW   Date Value Ref Range Status   08/30/2023 14.3 12.3 - 15.4 % Final     RDW-SD   Date Value Ref Range Status   08/30/2023 50.8 37.0 - 54.0 fl Final     MPV   Date Value Ref Range Status   08/30/2023 8.8 6.0 - 12.0 fL Final     Platelets   Date Value Ref Range Status   08/30/2023 159 140 - 450 10*3/mm3 Final     Neutrophil %   Date Value Ref Range Status   08/30/2023 72.7 42.7 - 76.0 % Final     Lymphocyte %   Date Value Ref Range Status   08/30/2023 16.8 (L) 19.6 - 45.3 %  Final     Monocyte %   Date Value Ref Range Status   08/30/2023 8.5 5.0 - 12.0 % Final     Eosinophil %   Date Value Ref Range Status   08/30/2023 1.4 0.3 - 6.2 % Final     Basophil %   Date Value Ref Range Status   08/30/2023 0.6 0.0 - 1.5 % Final     Neutrophils, Absolute   Date Value Ref Range Status   08/30/2023 5.20 1.70 - 7.00 10*3/mm3 Final     Lymphocytes, Absolute   Date Value Ref Range Status   08/30/2023 1.20 0.70 - 3.10 10*3/mm3 Final     Monocytes, Absolute   Date Value Ref Range Status   08/30/2023 0.60 0.10 - 0.90 10*3/mm3 Final     Eosinophils, Absolute   Date Value Ref Range Status   08/30/2023 0.10 0.00 - 0.40 10*3/mm3 Final     Basophils, Absolute   Date Value Ref Range Status   08/30/2023 0.00 0.00 - 0.20 10*3/mm3 Final     nRBC   Date Value Ref Range Status   08/30/2023 0.0 0.0 - 0.2 /100 WBC Final      CMP          8/30/2023    17:34   CMP   Glucose 146    BUN 29    Creatinine 1.07    EGFR 63.5    Sodium 140    Potassium 3.8    Chloride 100    Calcium 8.7    Total Protein 6.2    Albumin 3.6    Globulin 2.6    Total Bilirubin 0.4    Alkaline Phosphatase 55    AST (SGOT) 18    ALT (SGPT) <5    Albumin/Globulin Ratio 1.4    BUN/Creatinine Ratio 27.1    Anion Gap 9.0       Assessment & Plan        Active Hospital Problems:  Active Hospital Problems    Diagnosis     **Syncope      Plan:   Brief episode of unconsciousness, syncopal event  - pt now back to baseline  - neg for fall   -consider 2/2 UTI  - rule out neurological such as TIA or seizure  - Initial CT concerning for SAH. Repeat CT stable but still with questionable area  - continue to monitor   - neurology consulted and case was discussed by ED physician   - VSS  - troponin 52-> 49  - Electrolytes WNL  - No leukocytosis  - cultures pending  -PT/OT    UTI   - continue rocephin  - follow cultures    Hypothyroid  - continue synthroid    CAD s/p multiple stents/ HLD  - trend troponin  - continue imdur, toprol, crestor  - will hold lasix for  possible volume depletion         DVT prophylaxis:  Mechanical DVT prophylaxis orders are present.    CODE STATUS:    Medical Intervention Limits: NO intubation (DNI)  Code Status (Patient has no pulse and is not breathing): No CPR (Do Not Attempt to Resuscitate)  Medical Interventions (Patient has pulse or is breathing): Limited Support    Admission Status:  I believe this patient meets observation status.    I discussed the patient's findings and my recommendations with patient.    This patient has been examined wearing appropriate Personal Protective Equipment       Signature: Electronically signed by JUSTO Chan, 08/31/23, 02:55 EDT.  [facility] Hospitalist Team

## 2023-08-31 NOTE — THERAPY EVALUATION
Patient Name: Jermaine Black  : 1926    MRN: [id number]                              Today's Date: 2023       Admit Date: 2023    Visit Dx:     ICD-10-CM ICD-9-CM   1. Syncope, unspecified syncope type  R55 780.2   2. Subarachnoid hemorrhage  I60.9 430   3. Urinary tract infection with hematuria, site unspecified  N39.0 599.0    R31.9 599.70     Patient Active Problem List   Diagnosis    Syncope     History reviewed. No pertinent past medical history.  History reviewed. No pertinent surgical history.   General Information       Row Name 23 1408          General Information    Prior Level of Function independent:;all household mobility;min assist:;ADL's  Pt states his CG at times will assist him with dressing or bathing. He uses 2-wheeled walker per his report.  -     Existing Precautions/Restrictions fall  -     Barriers to Rehab cognitive status;previous functional deficit  -       Row Name 23 1408          Living Environment    People in Home spouse;other (see comments)  Pt states he has a CG 2 days week for some amnount of time each day but is not certain and was mildly confused in some of his answers. No cantact on file to confirm.  -       Row Name 23 1408          Home Main Entrance    Number of Stairs, Main Entrance two  -     Stair Railings, Main Entrance railings safe and in good condition  -       Row Name 23 1408          Stairs Within Home, Primary    Number of Stairs, Within Home, Primary none  -       Row Name 23 1408          Cognition    Orientation Status (Cognition) oriented to;person;disoriented to;place;time;situation;verbal cues/prompts needed for orientation  asked if he was at [place], did not know the month  -       Row Name 23 1408          Safety Issues, Functional Mobility    Safety Issues Affecting Function (Mobility) ability to follow commands;at risk behavior observed;awareness of need for assistance;insight into  deficits/self-awareness;judgment;problem-solving;sequencing abilities;safety precaution awareness  -     Impairments Affecting Function (Mobility) balance;cognition;coordination;endurance/activity tolerance;grasp;motor control;postural/trunk control;range of motion (ROM);strength  -               User Key  (r) = Recorded By, (t) = Taken By, (c) = Cosigned By      Initials Name Provider Type     Mikayla Mcneill OT Occupational Therapist                     Mobility/ADL's       Row Name 08/31/23 1417          Bed Mobility    Bed Mobility supine-sit  -     All Activities, [place] (Bed Mobility) 2 person assist;minimum assist (75% patient effort);moderate assist (50% patient effort);verbal cues  -     Assistive Device (Bed Mobility) head of bed elevated;bed rails  -       Row Name 08/31/23 1417          Transfers    Transfers sit-stand transfer;stand-sit transfer  -     Comment, (Transfers) variable skill with sit<>stand  -       Row Name 08/31/23 1417          Sit-Stand Transfer    Sit-Stand [place] (Transfers) moderate assist (50% patient effort);minimum assist (75% patient effort)  -     Assistive Device (Sit-Stand Transfers) walker, front-wheeled  -       Row Name 08/31/23 1417          Stand-Sit Transfer    Stand-Sit [place] (Transfers) minimum assist (75% patient effort);moderate assist (50% patient effort);verbal cues;nonverbal cues (demo/gesture)  -     Assistive Device (Stand-Sit Transfers) walker, front-wheeled  -       Row Name 08/31/23 1417          Functional Mobility    Functional Mobility- Ind. Level 2 person assist required;maximum assist (25% patient effort)  -     Functional Mobility-Distance (Feet) 4  -     Functional Mobility- Comment Pt started to walk away from the bed with min (A) and requested urinal but was not able to void or coorinate using urinal well, then pt  demonstrated AMS with noted things like drooling, not following commands, decreased gait  speed and praxia. Pt was assisted back to bed.  -       Row Name 08/31/23 1417          Activities of Daily Living    BADL Assessment/Intervention lower body dressing;toileting  -[facility] Name 08/31/23 1417          Lower Body Dressing Assessment/Training    [place] Level (Lower Body Dressing) don;socks;dependent (less than 25% patient effort)  -       Row Name 08/31/23 1417          Toileting Assessment/Training    [place] Level (Toileting) toileting skills;dependent (less than 25% patient effort)  -               User Key  (r) = Recorded By, (t) = Taken By, (c) = Cosigned By      Initials Name Provider Type     Mikayla Mcneill, OT Occupational Therapist                   Obj/Interventions       Row Name 08/31/23 1421          Sensory Assessment (Somatosensory)    Sensory Assessment (Somatosensory) sensation intact  -[facility] Name 08/31/23 1421          Vision Assessment/Intervention    Visual Impairment/Limitations unable/difficult to assess  -     Vision Assessment Comment pt has chronic lt eyelid disorder. Appears swolen and blephritis noted.  -       Row Name 08/31/23 1421          Range of Motion Comprehensive    General Range of Motion no range of motion deficits identified  -[facility] Name 08/31/23 1421          Strength Comprehensive (MMT)    Comment, General Manual Muscle Testing (MMT) Assessment BUE 4-/5; BLE 3+/5  -               User Key  (r) = Recorded By, (t) = Taken By, (c) = Cosigned By      Initials Name Provider Type     Mikayla Mcneill, OT Occupational Therapist                   Goals/Plan       Row Name 08/31/23 1429          Bed Mobility Goal 1 (OT)    Activity/Assistive Device (Bed Mobility Goal 1, OT) bed mobility activities, all  -     [place] Level/Cues Needed (Bed Mobility Goal 1, OT) modified independence  -     Time Frame (Bed Mobility Goal 1, OT) 2 weeks  -       Row Name 08/31/23 1429          Transfer Goal 1 (OT)    Activity/Assistive  Device (Transfer Goal 1, OT) transfers, all;walker, rolling  -     Day Level/Cues Needed (Transfer Goal 1, OT) standby assist  -     Time Frame (Transfer Goal 1, OT) 2 weeks  -       Row Name 08/31/23 1429          Toileting Goal 1 (OT)    Activity/Device (Toileting Goal 1, OT) toileting skills, all  -     Day Level/Cues Needed (Toileting Goal 1, OT) set-up required;supervision required  -     Time Frame (Toileting Goal 1, OT) 2 weeks  -       Row Name 08/31/23 1429          Grooming Goal 1 (OT)    Activity/Device (Grooming Goal 1, OT) grooming skills, all  -     Day (Grooming Goal 1, OT) set-up required  -     Time Frame (Grooming Goal 1, OT) 2 weeks  -       Row Name 08/31/23 1429          Therapy Assessment/Plan (OT)    Planned Therapy Interventions (OT) activity tolerance training;adaptive equipment training;BADL retraining;occupation/activity based interventions;ROM/therapeutic exercise;transfer/mobility retraining;patient/caregiver education/training  -               User Key  (r) = Recorded By, (t) = Taken By, (c) = Cosigned By      Initials Name Provider Type     Mikayla Mcneill, OT Occupational Therapist                   Clinical Impression       Row Name 08/31/23 1424          Pain Assessment    Pretreatment Pain Rating 0/10 - no pain  -     Posttreatment Pain Rating 0/10 - no pain  -       Row Name 08/31/23 1424          Plan of Care Review    Plan of Care Reviewed With patient  -     Progress no change  -     Outcome Evaluation 89 y.o. male with pmh hypothyroidism, Parkinson's, coronary artery disease, hyperlipidemia, GERD, BPH, hypertension, congestive heart failure who [facility] following a syncopal event. Pt became unarousable and CG was present, called EMS. Lives with spouse. 2 CT done and note some atypical areas in the left frontal lobe but cause or etiology indeterminate. See radiology notes. Pt was  mildly confused and described his baseline and home functioning but was not certain about some things. There were no contacts on file or family at bedside to cooberate pt self-report that he at times needs min (A) with ADL, that he uses RW, and has CG 2 days/week for IADL. Pt was eager to get OOB and came to sit EOB with mod (A). He stood with min (A) of 2 and then slowly demonstrated AMS and was assisted to sit on the EOB. BP was not checked and some signs appeared like [place] but some signs appeared neurological. His Parkinson's complicates symptom presentation. He appears unsafe to go home. OT recommends IP rehab at this time with the caveat that his case is developing and complex so OT will follow and update d/c rec's as able.  -       Row Name 08/31/23 1424          Therapy Assessment/Plan (OT)    Rehab Potential (OT) good, to achieve stated therapy goals  -     Criteria for Skilled Therapeutic Interventions Met (OT) skilled treatment is necessary  -     Therapy Frequency (OT) 5 times/wk  -     Predicted Duration of Therapy Intervention (OT) until d/c  -       Row Name 08/31/23 1424          Therapy Plan Review/Discharge Plan (OT)    Anticipated Discharge Disposition (OT) inpatient rehabilitation facility  -       Row Name 08/31/23 1424          Vital Signs    O2 Delivery Pre Treatment room air  -     Pre Patient Position Supine  -     Intra Patient Position Standing  -     Post Patient Position Supine  -       Row Name 08/31/23 1420          Positioning and Restraints    Pre-Treatment Position in bed  -     Post Treatment Position bed  -     In Bed notified nsg;fowlers;call light within reach;encouraged to call for assist;exit alarm on  -               User Key  (r) = Recorded By, (t) = Taken By, (c) = Cosigned By      Initials Name Provider Type     Mikayla Mcneill, OT Occupational Therapist                   Outcome Measures       Row Name 08/31/23 1300          How much help from  another person do you currently need...    Turning from your back to your side while in flat bed without using bedrails? 2  -MB     Moving from lying on back to sitting on the side of a flat bed without bedrails? 2  -MB     Moving to and from a bed to a chair (including a wheelchair)? 2  -MB     Standing up from a chair using your arms (e.g., wheelchair, bedside chair)? 2  -MB     Climbing 3-5 steps with a railing? 2  -MB     To walk in hospital room? 2  -MB     AM-PAC 6 Clicks Score (PT) 12  -MB     Highest level of mobility 4 --> Transferred to chair/commode  -MB       Row Name 08/31/23 1430          Modified [place] Scale    Pre-Stroke Modified [place] Scale 2 - Slight disability.  Unable to carry out all previous activities but able to look after own affairs without assistance.  -     Modified [place] Scale 4 - Moderately severe disability.  Unable to walk without assistance, and unable to attend to own bodily needs without assistance.  -       Row Name 08/31/23 1430 08/31/23 1300       Functional Assessment    Outcome Measure Options Modified [place]  - AM-PAC 6 Clicks Basic Mobility (PT)  -MB              User Key  (r) = Recorded By, (t) = Taken By, (c) = Cosigned By      Initials Name Provider Type     Mikayla Mcneill, OT Occupational Therapist    Servando Francisco, PT Physical Therapist                    Occupational Therapy Education       Title: PT OT SLP Therapies (In Progress)       Topic: Occupational Therapy (In Progress)       Point: ADL training (Done)       Description:   Instruct learner(s) on proper safety adaptation and remediation techniques during self care or transfers.   Instruct in proper use of assistive devices.                  Learning Progress Summary             Patient Acceptance, E,TB,D, VU,NR by  at 8/31/2023 1431                         Point: Home exercise program (Not Started)       Description:   Instruct learner(s) on appropriate technique for monitoring, assisting and/or  progressing therapeutic exercises/activities.                  Learner Progress:  Not documented in this visit.              Point: Precautions (Done)       Description:   Instruct learner(s) on prescribed precautions during self-care and functional transfers.                  Learning Progress Summary             Patient Acceptance, E,TB,D, VU,NR by  at 8/31/2023 1431                         Point: Body mechanics (Not Started)       Description:   Instruct learner(s) on proper positioning and spine alignment during self-care, functional mobility activities and/or exercises.                  Learner Progress:  Not documented in this visit.                              User Key       Initials Effective Dates Name Provider Type Discipline     06/16/21 -  Mikayla Mcneill, SMITH Occupational Therapist OT                  OT Recommendation and Plan  Planned Therapy Interventions (OT): activity tolerance training, adaptive equipment training, BADL retraining, occupation/activity based interventions, ROM/therapeutic exercise, transfer/mobility retraining, patient/caregiver education/training  Therapy Frequency (OT): 5 times/wk  Plan of Care Review  Plan of Care Reviewed With: patient  Progress: no change  Outcome Evaluation: 89 y.o. male with pmh hypothyroidism, Parkinson's, coronary artery disease, hyperlipidemia, GERD, BPH, hypertension, congestive heart failure who presented to [facility] on 8/30/2023 following a syncopal event. Pt became unarousable and CG was present, called EMS. Lives with spouse. 2 CT done and note some atypical areas in the left frontal lobe but cause or etiology indeterminate. See radiology notes. Pt was mildly confused and described his baseline and home functioning but was not certain about some things. There were no contacts on file or family at bedside to cooberate pt self-report that he at times needs min (A) with ADL, that he uses RW, and has CG 2 days/week for IADL. Pt was eager  to get OOB and came to sit EOB with mod (A). He stood with min (A) of 2 and then slowly demonstrated AMS and was assisted to sit on the EOB. BP was not checked and some signs appeared like [place] but some signs appeared neurological. His Parkinson's complicates symptom presentation. He appears unsafe to go home. OT recommends IP rehab at this time with the caveat that his case is developing and complex so OT will follow and update d/c rec's as able.     Time Calculation:   Evaluation Complexity (OT)  Review Occupational Profile/Medical/Therapy History Complexity: expanded/moderate complexity  Assessment, Occupational Performance/Identification of Deficit Complexity: 5 or more performance deficits  Clinical Decision Making Complexity (OT): detailed assessment/moderate complexity  Overall Complexity of Evaluation (OT): moderate complexity     Time Calculation- OT       Row Name 08/31/23 1432             Time Calculation-     OT Start Time 1016  -      OT Stop Time 1047  -      OT Time Calculation (min) 31 min  -      Total Timed Code Minutes- OT 0 minute(s)  -      OT Received On 08/31/23  -      OT - Next Appointment 09/01/23  -      OT Goal Re-Cert Due Date 09/14/23  -                User Key  (r) = Recorded By, (t) = Taken By, (c) = Cosigned By      Initials Name Provider Type     Mikayla Mcneill OT Occupational Therapist                  Therapy Charges for Today       Code Description Service Date Service Provider Modifiers Qty    [id number]  OT EVAL MOD COMPLEXITY 4 8/31/2023 Mikayla Mcneill OT GO 1                 Mikayla Mcneill OT  8/31/2023

## 2023-08-31 NOTE — CONSULTS
[facility] NEUROSURGERY CONSULT NOTE    Patient Name: Jermaine Black  Referring Provider: Rashid Vallecillo DO  Reason for Consultation: Questionable subarachnoid left frontal lobe    Patient Care Team:  Provider, No Known as PCP - General    Chief Complaint: Syncopal event    History of Present Illness:  Patient is a 89 y.o.  male with PMH of Parkinson's who presented to [facility] emergency department on 8/30/2023 after suffering a syncopal event. Evidently patient was eating lunch and became unconscious per the caregiver.  There is no reported fall or head trauma.  When patient was unable to be aroused EMS was notified.  ED work-up demonstrated UTI and CT scan did show a questionable area of subarachnoid blood left frontal lobe with serial imaging demonstrating stability.  Neurosurgery was consulted for further evaluation and recommendations.  Upon my evaluation, patient is sitting up in bed getting ready to eat breakfast.  He is alert and oriented.  Patient recalls sitting and eating yesterday and then the next thing he knows he was in the ambulance.  He is denying any headache, dizziness, lateralizing weakness, nausea/vomiting, acute visual changes (chronic visual impairment), speech issues.  He reports that he lives independently with his wife who is 100 years old.  He does report that he has fallen in the past and scraped up his elbows but denies striking his head.       Review of Systems:    Review of Systems   All other systems reviewed and are negative.    Past Medical History:  History reviewed. No pertinent past medical history.    Past Surgical History:  History reviewed. No pertinent surgical history.  Reviewed past surgical history and it is not pertinent to this visit    Family History:  History reviewed. No pertinent family history.  Reviewed past family history and it is not pertinent to this visit    Social History:  Social History     Tobacco Use    Smoking status: Never    Smokeless  tobacco: Never   Vaping Use    Vaping Use: Never used   Substance Use Topics    Alcohol use: Never    Drug use: Never     Reviewed past social history and it is not pertinent to this visit    Allergies:  Patient has no known allergies.    Home Medications:  Prior to Admission medications    Medication Sig Start Date End Date Taking? Authorizing Provider   carbidopa-levodopa ER (SINEMET CR)  MG per tablet Take 1 tablet by mouth 3 (Three) Times a Day.   Yes Michelle Pretty MD   carboxymethylcellulose (REFRESH PLUS) 0.5 % solution Administer 1 drop to both eyes 2 (Two) Times a Day.   Yes Michelle Pretty MD   colesevelam (WELCHOL) 625 MG tablet Take 1 tablet by mouth 2 (Two) Times a Day With Meals.   Yes Michelle Pretty MD   dutasteride (AVODART) 0.5 MG capsule Take 1 capsule by mouth Daily.   Yes Michelle Pretty MD   erythromycin (ROMYCIN) 5 MG/GM ophthalmic ointment Administer 1 application  to both eyes Every Night.   Yes Michelle Pretty MD   furosemide (LASIX) 40 MG tablet Take 1 tablet by mouth Daily.   Yes Michelle Pretty MD   isosorbide mononitrate (IMDUR) 60 MG 24 hr tablet Take 1 tablet by mouth Daily.   Yes Michelle Pretty MD   levothyroxine (SYNTHROID, LEVOTHROID) 75 MCG tablet Take 1 tablet by mouth Daily.   Yes Michelle Pretty MD   metoprolol succinate XL (TOPROL-XL) 50 MG 24 hr tablet Take 1 tablet by mouth Daily.   Yes Michelle Pretty MD   nitroglycerin (NITROSTAT) 0.4 MG SL tablet Place 1 tablet under the tongue Every 5 (Five) Minutes As Needed for Chest Pain. Take no more than 3 doses in 15 minutes.   Yes Michelle Pretty MD   Pimavanserin Tartrate (Nuplazid) 34 MG capsule Take 1 capsule by mouth Every Night.   Yes Michelle Pretty MD   rosuvastatin (CRESTOR) 10 MG tablet Take 1 tablet by mouth Daily.   Yes Michelle Pretty MD   tamsulosin (FLOMAX) 0.4 MG capsule 24 hr capsule Take 1 capsule by mouth Daily.   Yes Maria De Jesus  MD Michelle       Results Review:  Labs:  Recent Results (from the past 24 hour(s))   ECG 12 Lead Syncope    Collection Time: 08/30/23  4:51 PM   Result Value Ref Range    QT Interval 430 ms    QTC Interval 465 ms   Comprehensive Metabolic Panel    Collection Time: 08/30/23  5:34 PM    Specimen: Blood   Result Value Ref Range    Glucose 146 (H) 65 - 99 mg/dL    BUN 29 (H) 8 - 23 mg/dL    Creatinine 1.07 0.76 - 1.27 mg/dL    Sodium 140 136 - 145 mmol/L    Potassium 3.8 3.5 - 5.2 mmol/L    Chloride 100 98 - 107 mmol/L    CO2 31.0 (H) 22.0 - 29.0 mmol/L    Calcium 8.7 8.2 - 9.6 mg/dL    Total Protein 6.2 6.0 - 8.5 g/dL    Albumin 3.6 3.5 - 5.2 g/dL    ALT (SGPT) <5 1 - 41 U/L    AST (SGOT) 18 1 - 40 U/L    Alkaline Phosphatase 55 39 - 117 U/L    Total Bilirubin 0.4 0.0 - 1.2 mg/dL    Globulin 2.6 gm/dL    A/G Ratio 1.4 g/dL    BUN/Creatinine Ratio 27.1 (H) 7.0 - 25.0    Anion Gap 9.0 5.0 - 15.0 mmol/L    eGFR 63.5 >60.0 mL/min/1.73   Protime-INR    Collection Time: 08/30/23  5:34 PM    Specimen: Blood   Result Value Ref Range    Protime 11.0 9.6 - 11.7 Seconds    INR 1.03 0.93 - 1.10   aPTT    Collection Time: 08/30/23  5:34 PM    Specimen: Blood   Result Value Ref Range    PTT 26.5 (L) 61.0 - 76.5 seconds   High Sensitivity Troponin T    Collection Time: 08/30/23  5:34 PM    Specimen: Blood   Result Value Ref Range    HS Troponin T 52 (H) <15 ng/L   Magnesium    Collection Time: 08/30/23  5:34 PM    Specimen: Blood   Result Value Ref Range    Magnesium 2.1 1.7 - 2.3 mg/dL   TSH    Collection Time: 08/30/23  5:34 PM    Specimen: Blood   Result Value Ref Range    TSH 1.720 0.270 - 4.200 uIU/mL   CBC Auto Differential    Collection Time: 08/30/23  5:34 PM    Specimen: Blood   Result Value Ref Range    WBC 7.20 3.40 - 10.80 10*3/mm3    RBC 3.73 (L) 4.14 - 5.80 10*6/mm3    Hemoglobin 12.3 (L) 13.0 - 17.7 g/dL    Hematocrit 36.0 (L) 37.5 - 51.0 %    MCV 96.5 79.0 - 97.0 fL    MCH 33.1 (H) 26.6 - 33.0 pg    MCHC 34.3 31.5  - 35.7 g/dL    RDW 14.3 12.3 - 15.4 %    RDW-SD 50.8 37.0 - 54.0 fl    MPV 8.8 6.0 - 12.0 fL    Platelets 159 140 - 450 10*3/mm3    Neutrophil % 72.7 42.7 - 76.0 %    Lymphocyte % 16.8 (L) 19.6 - 45.3 %    Monocyte % 8.5 5.0 - 12.0 %    Eosinophil % 1.4 0.3 - 6.2 %    Basophil % 0.6 0.0 - 1.5 %    Neutrophils, Absolute 5.20 1.70 - 7.00 10*3/mm3    Lymphocytes, Absolute 1.20 0.70 - 3.10 10*3/mm3    Monocytes, Absolute 0.60 0.10 - 0.90 10*3/mm3    Eosinophils, Absolute 0.10 0.00 - 0.40 10*3/mm3    Basophils, Absolute 0.00 0.00 - 0.20 10*3/mm3    nRBC 0.0 0.0 - 0.2 /100 WBC   Urinalysis With Culture If Indicated - Urine, Clean Catch    Collection Time: 08/30/23  6:23 PM    Specimen: Urine, Clean Catch   Result Value Ref Range    Color, UA Yellow Yellow, Straw    Appearance, UA Clear Clear    pH, UA <=5.0 5.0 - 8.0    Specific Gravity, UA 1.016 1.005 - 1.030    Glucose, UA Negative Negative    Ketones, UA Trace (A) Negative    Bilirubin, UA Negative Negative    Blood, UA Negative Negative    Protein, UA Negative Negative    Leuk Esterase, UA Small (1+) (A) Negative    Nitrite, UA Negative Negative    Urobilinogen, UA 0.2 E.U./dL 0.2 - 1.0 E.U./dL   Urinalysis, Microscopic Only - Urine, Clean Catch    Collection Time: 08/30/23  6:23 PM    Specimen: Urine, Clean Catch   Result Value Ref Range    RBC, UA 0-2 (A) None Seen /HPF    WBC, UA 21-30 (A) None Seen /HPF    Bacteria, UA 4+ (A) None Seen /HPF    Squamous Epithelial Cells, UA 0-2 None Seen, 0-2 /HPF    Hyaline Casts, UA 3-6 None Seen /LPF    Methodology Automated Microscopy    Urine Culture - Urine, Urine, Clean Catch    Collection Time: 08/30/23  6:23 PM    Specimen: Urine, Clean Catch   Result Value Ref Range    Urine Culture No growth    High Sensitivity Troponin T 2Hr    Collection Time: 08/30/23  7:32 PM    Specimen: Blood   Result Value Ref Range    HS Troponin T 49 (H) <15 ng/L    Troponin T Delta -3 >=-4 - <+4 ng/L   Comprehensive Metabolic Panel     Collection Time: 08/31/23  4:28 AM    Specimen: Blood   Result Value Ref Range    Glucose 94 65 - 99 mg/dL    BUN 24 (H) 8 - 23 mg/dL    Creatinine 1.00 0.76 - 1.27 mg/dL    Sodium 143 136 - 145 mmol/L    Potassium 3.8 3.5 - 5.2 mmol/L    Chloride 103 98 - 107 mmol/L    CO2 33.0 (H) 22.0 - 29.0 mmol/L    Calcium 9.0 8.2 - 9.6 mg/dL    Total Protein 6.1 6.0 - 8.5 g/dL    Albumin 3.6 3.5 - 5.2 g/dL    ALT (SGPT) 6 1 - 41 U/L    AST (SGOT) 21 1 - 40 U/L    Alkaline Phosphatase 52 39 - 117 U/L    Total Bilirubin 0.5 0.0 - 1.2 mg/dL    Globulin 2.5 gm/dL    A/G Ratio 1.4 g/dL    BUN/Creatinine Ratio 24.0 7.0 - 25.0    Anion Gap 7.0 5.0 - 15.0 mmol/L    eGFR 68.9 >60.0 mL/min/1.73   CBC Auto Differential    Collection Time: 08/31/23  4:28 AM    Specimen: Blood   Result Value Ref Range    WBC 10.10 3.40 - 10.80 10*3/mm3    RBC 3.85 (L) 4.14 - 5.80 10*6/mm3    Hemoglobin 12.8 (L) 13.0 - 17.7 g/dL    Hematocrit 38.1 37.5 - 51.0 %    MCV 99.0 (H) 79.0 - 97.0 fL    MCH 33.4 (H) 26.6 - 33.0 pg    MCHC 33.7 31.5 - 35.7 g/dL    RDW 14.3 12.3 - 15.4 %    RDW-SD 49.0 37.0 - 54.0 fl    MPV 9.2 6.0 - 12.0 fL    Platelets 161 140 - 450 10*3/mm3    Neutrophil % 78.2 (H) 42.7 - 76.0 %    Lymphocyte % 12.8 (L) 19.6 - 45.3 %    Monocyte % 7.7 5.0 - 12.0 %    Eosinophil % 0.9 0.3 - 6.2 %    Basophil % 0.4 0.0 - 1.5 %    Neutrophils, Absolute 7.90 (H) 1.70 - 7.00 10*3/mm3    Lymphocytes, Absolute 1.30 0.70 - 3.10 10*3/mm3    Monocytes, Absolute 0.80 0.10 - 0.90 10*3/mm3    Eosinophils, Absolute 0.10 0.00 - 0.40 10*3/mm3    Basophils, Absolute 0.00 0.00 - 0.20 10*3/mm3    nRBC 0.0 0.0 - 0.2 /100 WBC   High Sensitivity Troponin T    Collection Time: 08/31/23  4:28 AM    Specimen: Blood   Result Value Ref Range    HS Troponin T 56 (C) <15 ng/L       Radiology:  CT Head Without Contrast    Result Date: 8/31/2023  Impression: 1. Stable 3-4 mm subtle hyperdensity adjacent to the left superior frontal gyrus. In the absence of trauma, this may  represent a parenchymal calcification. Otherwise, this still could represent a small focus of subarachnoid hemorrhage. There do not appear to be significant external signs of trauma to the head. Consider 24-hour follow-up head CT. 2. Moderate chronic small vessel ischemic change and moderate generalized parenchymal volume loss. Electronically Signed: Erik Baer MD  8/31/2023 12:18 AM EDT  Workstation ID: [id number]    CT Head Without Contrast    Result Date: 8/30/2023  Impression: 1.Subtle hyperdensity in the left frontal lobe may represent calcifications, although a trace subarachnoid hemorrhage is not completely excluded. Please consider repeat CT in 4 to 6 hours to document stability. 2.Low-density fluid along the right frontal convexity, measuring 1.2 cm in thickness, is concerning for an old subdural hemorrhage versus subdural hygroma. Electronically Signed: Chemo Vargas DO  8/30/2023 7:15 PM EDT  Workstation ID: [id number]    XR Chest 1 View    Result Date: 8/30/2023  Impression: Low lung volumes accentuates pulmonary vasculature and cardiomediastinal silhouette. No definite acute cardiopulmonary abnormality. Electronically Signed: Chemo Vargas DO  8/30/2023 5:50 PM EDT  Workstation ID: [id number]     Results Review:   I reviewed the patient's new clinical results.  I personally viewed and interpreted the patient's CT scan and serial CT scan demonstrating age-related parenchymal loss with a tiny subtle focus of hyperintensity in the left frontal lobe stable over serial scans.  This likely represents calcification.  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    Vital Signs:   Temp:  [97.4 °F (36.3 °C)-98.2 °F (36.8 °C)] 98 °F (36.7 °C)  Heart Rate:  [70-80] 70  Resp:  [14-18] 14  BP: (143-176)/(62-88) 143/70        Physical Exam:  /70 (BP Location: Right arm, Patient Position: Lying)    "Pulse 70   Temp 98 °F (36.7 °C) (Oral)   Resp 14   Ht 167.6 cm (66\")   Wt 86.2 kg (190 lb)   SpO2 95%   BMI 30.67 kg/m²     General Appearance:  Alert, cooperative, no distress, appropriate for age                             Head:  Normocephalic, without obvious abnormality                              Eyes:  PERRL, EOM's intact, conjunctivae and cornea clear,                               Nose:  Nares symmetrical, septum midline, mucosa pink, clear watery discharge; no sinus tenderness                           Throat:  Lips, tongue, and mucosa are moist, pink, and intact;                               Neck:  Supple; symmetrical, trachea midline, no adenopathy; thyroid: no enlargement, symmetric, no tenderness/mass/nodules                              Back:  Symmetrical, no curvature, ROM normal, no CVA tenderness                             Lungs:  respirations unlabored, no audible wheeze                             Heart:  regular rate & rhythm, S1 and S2 normal                      Abdomen:  Soft, nontender, bowel sounds active all four quadrants          Musculoskeletal:  Tone and strength strong and symmetrical, all extremities; no joint pain or edema                                        Lymphatic:  No adenopathy              Skin/Hair/Nails: Bilateral scabbing and skin tears along elbows                      Neurologic:   Mental Status: The patient is awake, alert and oriented x 3. Recent and remote memory functions are normal. Language is fluent. Speech is clear. The speech is nondysarthric. Fund of knowledge is normal.  Cranial Nerve V: Facial sensation is intact to light touch.  Cranial Nerve VII: Facial movements are symmetric  Cranial Nerve XI: Sternocleidomastoid strength is 5/5 bilaterally with normal shoulder shrug.  Cranial Nerve XII: Midline tongue protrusion without atrophy or fasciculations.  Motor: Tone is normal in all four extremities without fasciculations, atrophy, or myoclonus. " "There are no involuntary movements.   Muscle Strength: 5/5 muscle strength in bilateral upper and bilateral lower extremities. No pronator drift.  Sensory: The sensory examination is normal for light touch bilaterally and symmetrically.  Cerebellar: Finger to nose intact bilaterally. .      Syncope      Patient is a 89-year-old male with history of Parkinson's that presents with questionable subarachnoid hemorrhage versus likely vessel calcification left frontal lobe especially in light of no reported new trauma.  He does have evidence of an older/chronic fluid collection along the right convexity suggestive of a hygroma and given his report of previous falls may represent a sequela of an older head injury.  This does not appear to be causing any significant mass effect or midline shift.  Patient is alert and oriented and appears to be at neurologic baseline.  Intracranial findings not likely felt to be contributing to patient's syncopal episode.   No further imaging warranted as patient is a DNR and absolutely refuses any type of neurosurgical intervention for any life-saving measures should it become necessary.   Further work-up per neurology.       PLAN:     SAH    -Questionable calcification  -Patient DNR with refusal for any neurosurgical intervention should it become necessary  - Further work-up per neurology        I discussed the patient's findings and my recommendations with patient and nursing staff Dr. Grullon.    Rachel Guido, APRN  08/31/23  13:08 EDT    \"Dictated utilizing Dragon dictation\".      "

## 2023-09-01 PROBLEM — G20 PARKINSON'S DISEASE: Status: ACTIVE | Noted: 2023-09-01

## 2023-09-01 PROBLEM — G20.A1 PARKINSON'S DISEASE: Status: ACTIVE | Noted: 2023-09-01

## 2023-09-01 PROBLEM — I10 HTN (HYPERTENSION): Status: ACTIVE | Noted: 2023-09-01

## 2023-09-01 PROBLEM — I25.10 CAD (CORONARY ARTERY DISEASE): Status: ACTIVE | Noted: 2023-09-01

## 2023-09-01 PROBLEM — I50.9 CHF (CONGESTIVE HEART FAILURE): Status: ACTIVE | Noted: 2023-09-01

## 2023-09-01 PROBLEM — E86.0 DEHYDRATION: Status: ACTIVE | Noted: 2023-09-01

## 2023-09-01 PROBLEM — E78.5 HLD (HYPERLIPIDEMIA): Status: ACTIVE | Noted: 2023-09-01

## 2023-09-01 PROCEDURE — G0378 HOSPITAL OBSERVATION PER HR: HCPCS

## 2023-09-01 PROCEDURE — 96361 HYDRATE IV INFUSION ADD-ON: CPT

## 2023-09-01 PROCEDURE — 97530 THERAPEUTIC ACTIVITIES: CPT

## 2023-09-01 PROCEDURE — 97110 THERAPEUTIC EXERCISES: CPT

## 2023-09-01 RX ADMIN — ISOSORBIDE MONONITRATE 60 MG: 60 TABLET, EXTENDED RELEASE ORAL at 09:31

## 2023-09-01 RX ADMIN — LEVOTHYROXINE SODIUM 75 MCG: 0.12 TABLET ORAL at 06:19

## 2023-09-01 RX ADMIN — SENNOSIDES AND DOCUSATE SODIUM 2 TABLET: 50; 8.6 TABLET ORAL at 21:07

## 2023-09-01 RX ADMIN — POLYETHYLENE GLYCOL 3350 17 G: 17 POWDER, FOR SOLUTION ORAL at 21:08

## 2023-09-01 RX ADMIN — CARBIDOPA AND LEVODOPA 1 TABLET: 25; 100 TABLET, EXTENDED RELEASE ORAL at 09:31

## 2023-09-01 RX ADMIN — CARBIDOPA AND LEVODOPA 1 TABLET: 25; 100 TABLET, EXTENDED RELEASE ORAL at 16:55

## 2023-09-01 RX ADMIN — TAMSULOSIN HYDROCHLORIDE 0.4 MG: 0.4 CAPSULE ORAL at 09:31

## 2023-09-01 RX ADMIN — SENNOSIDES AND DOCUSATE SODIUM 2 TABLET: 50; 8.6 TABLET ORAL at 09:31

## 2023-09-01 RX ADMIN — CHOLESTYRAMINE 4 G: 4 POWDER, FOR SUSPENSION ORAL at 08:30

## 2023-09-01 RX ADMIN — CARBIDOPA AND LEVODOPA 1 TABLET: 25; 100 TABLET, EXTENDED RELEASE ORAL at 21:15

## 2023-09-01 RX ADMIN — Medication 10 ML: at 21:08

## 2023-09-01 RX ADMIN — METOPROLOL SUCCINATE 50 MG: 50 TABLET, EXTENDED RELEASE ORAL at 09:31

## 2023-09-01 RX ADMIN — ROSUVASTATIN 10 MG: 10 TABLET, FILM COATED ORAL at 09:31

## 2023-09-01 RX ADMIN — ERYTHROMYCIN 1 APPLICATION: 5 OINTMENT OPHTHALMIC at 21:07

## 2023-09-01 RX ADMIN — Medication 10 ML: at 09:32

## 2023-09-01 RX ADMIN — FINASTERIDE 5 MG: 5 TABLET, FILM COATED ORAL at 09:31

## 2023-09-01 NOTE — THERAPY TREATMENT NOTE
Subjective: Pt agreeable to therapeutic plan of care.    Objective:     Bed mobility - CGA supine>sit EOB  Transfers - Min-A STS x 2 with HHA  Ambulation - 8 feet CGA and Min-A with HHAx1 and other UE holding on to the bed rail    Therapeutic Exercise - 10 Reps B LE AROM unsupported sitting / EOB. Pt completed 2x10 of each of the following exercises sitting EOB: LAQ, Seated Alternating March, Toe Raises, and Heel Raises    Vitals: Tachycardic  HR Before Activity: 70bpm  HR During Activity: 100bpm  HR After Activity: 75bpm    Pain: 4 VAS   Location: R Hip  Intervention for pain: Repositioned, RN notified, Increased Activity, and Therapeutic Presence    Education: Provided education on the importance of mobility in the acute care setting, Verbal/Tactile Cues, Transfer Training, Gait Training, and Energy conservation strategies    Assessment: Jermaine Black presents with functional mobility impairments which indicate the need for skilled intervention. Tolerating session today without incident. Pt progresses well with therapy this session, but still shows some signs of confusion, such as not remember session from previous day. Pt completes bed mobility CGA this session, completes STS transfer min A, and amb 8' CGA>min A with HHA and other UE holding on to bed railing. He amb with festinating gait and decreased step length this session, but overall shows improved activity tolerance. He completes ther ex EOB and was educated on HEP in bed to prevent muscle atrophy in during stay. Will continue to follow and progress as tolerated.     Plan/Recommendations:   High Intensity Therapy recommended post-acute care. This is recommended as therapy feels the patient would require 5-6 days per week, 2-3 hours per day. At this time, inpatient rehabilitation (acute rehab) would be the first choice and SNF would be second.. Pt requires no DME at discharge.     Pt desires Inpatient Rehabilitation placement at discharge. Pt  cooperative; agreeable to therapeutic recommendations and plan of care.         Basic Mobility 6-click:  Rollin = Total, A lot = 2, A little = 3; 4 = None  Supine>Sit:   1 = Total, A lot = 2, A little = 3; 4 = None   Sit>Stand with arms:  1 = Total, A lot = 2, A little = 3; 4 = None  Bed>Chair:   1 = Total, A lot = 2, A little = 3; 4 = None  Ambulate in room:  1 = Total, A lot = 2, A little = 3; 4 = None  3-5 Steps with railin = Total, A lot = 2, A little = 3; 4 = None  Score: 17    Modified Ruddy: 4 = Moderately severe disability (Unable to attend to own bodily needs without assistance, and unable to walk unassisted)     Post-Tx Position: Supine with HOB Elevated, Alarms activated, and Call light and personal items within reach  PPE: gloves

## 2023-09-01 NOTE — PLAN OF CARE
Goal Outcome Evaluation:               Patient is alert with intermittent confusion and disorientation. Per patient's daughter this is patient's baseline. Patient came to ED status post syncopal episode at home where he lost consciousness. Patient is reportedly legally blind secondary to macular degeneration and is hard of hearing at baseline. Incidental finding of uti with 4+ bacteria noted in urinalysis, currently receiving iv rocephin pending sensitivity. No complaints of dysuria at this time. Patient has remained on room air without complaints of shortness of air, pain or discomfort. Call light in reach, electrode placed on call light to assist patient in finding nurse call button on call light.

## 2023-09-01 NOTE — PROGRESS NOTES
"DAILY PROGRESS NOTE  [facility]    Patient Identification:  Name: Jermaine Black  Age: 89 y.o.  Sex: male  :  1926  MRN: [id number]         Primary Care Physician: Provider, No Known    Subjective:  Interval History: He is confused and very weak.    Objective:    Scheduled Meds:carbidopa-levodopa ER, 1 tablet, Oral, TID  cefTRIAXone, 2,000 mg, Intravenous, Q24H  cholestyramine light, 1 packet, Oral, Daily  erythromycin, 1 application , Both Eyes, Nightly  finasteride, 5 mg, Oral, Daily  isosorbide mononitrate, 60 mg, Oral, Daily  levothyroxine, 75 mcg, Oral, Q AM  metoprolol succinate XL, 50 mg, Oral, Daily  rosuvastatin, 10 mg, Oral, Daily  senna-docusate sodium, 2 tablet, Oral, BID  sodium chloride, 10 mL, Intravenous, Q12H  tamsulosin, 0.4 mg, Oral, Daily      Continuous Infusions:     Vital signs in last 24 hours:  Temp:  [97.4 °F (36.3 °C)-97.7 °F (36.5 °C)] 97.4 °F (36.3 °C)  Heart Rate:  [70-84] 74  Resp:  [13-16] 16  BP: (138-181)/(61-97) 138/83    Intake/Output:  No intake or output data in the 24 hours ending 23 1319    Exam:  /83   Pulse 74   Temp 97.4 °F (36.3 °C) (Oral)   Resp 16   Ht 167.6 cm (66\")   Wt 86.2 kg (190 lb)   SpO2 94%   BMI 30.67 kg/m²     General Appearance:    Alert, cooperative, no distress   Head:    Normocephalic, without obvious abnormality, atraumatic   Eyes:       Throat:   Lips, tongue, gums normal   Neck:   Supple, symmetrical, trachea midline, no JVD   Lungs:     Clear to auscultation bilaterally, respirations unlabored   Chest Wall:    No tenderness or deformity    Heart:    Regular rate and rhythm, S1 and S2 normal, no murmur,no  rub or gallop   Abdomen:     Soft, nontender, bowel sounds active, no masses, no organomegaly    Extremities:   Extremities normal, atraumatic, no cyanosis or edema   Pulses:      Skin:   Skin is warm and dry,  no rashes or palpable lesions   Neurologic: He is very weak and has Parkinson's      Lab Results " (last 72 hours)       Procedure Component Value Units Date/Time    Urine Culture - Urine, Urine, Clean Catch [[id number]]  (Normal) Collected: 08/30/23 1823    Specimen: Urine, Clean Catch Updated: 08/31/23 2003     Urine Culture No growth    Blood Culture - Blood, Wrist, Right [[id number]]  (Normal) Collected: 08/30/23 1932    Specimen: Blood from Wrist, Right Updated: 08/31/23 1946     Blood Culture No growth at 24 hours    Narrative:      Less than seven (7) mL's of blood was collected.  Insufficient quantity may yield false negative results.    Blood Culture - Blood, Arm, Right [[id number]]  (Normal) Collected: 08/30/23 1932    Specimen: Blood from Arm, Right Updated: 08/31/23 1946     Blood Culture No growth at 24 hours    High Sensitivity Troponin T [[id number]]  (Abnormal) Collected: 08/31/23 0428    Specimen: Blood Updated: 08/31/23 0526     HS Troponin T 56 ng/L     Narrative:      High Sensitive Troponin T Reference Range:  <10.0 ng/L- Negative Female for AMI  <15.0 ng/L- Negative Male for AMI  >=10 - Abnormal Female indicating possible myocardial injury.  >=15 - Abnormal Male indicating possible myocardial injury.   Clinicians would have to utilize clinical acumen, EKG, Troponin, and serial changes to determine if it is an Acute Myocardial Infarction or myocardial injury due to an underlying chronic condition.         Comprehensive Metabolic Panel [[id number]]  (Abnormal) Collected: 08/31/23 0428    Specimen: Blood Updated: 08/31/23 0517     Glucose 94 mg/dL      BUN 24 mg/dL      Creatinine 1.00 mg/dL      Sodium 143 mmol/L      Potassium 3.8 mmol/L      Chloride 103 mmol/L      CO2 33.0 mmol/L      Calcium 9.0 mg/dL      Total Protein 6.1 g/dL      Albumin 3.6 g/dL      ALT (SGPT) 6 U/L      AST (SGOT) 21 U/L      Alkaline Phosphatase 52 U/L      Total Bilirubin 0.5 mg/dL      Globulin 2.5 gm/dL      A/G Ratio 1.4 g/dL      BUN/Creatinine Ratio 24.0     Anion Gap 7.0 mmol/L      eGFR 68.9 mL/min/1.73      Narrative:      GFR Normal >60  Chronic Kidney Disease <60  Kidney Failure <15    The GFR formula is only valid for adults with stable renal function between ages 18 and 70.    CBC Auto Differential [[id number]]  (Abnormal) Collected: 08/31/23 0428    Specimen: Blood Updated: 08/31/23 0459     WBC 10.10 10*3/mm3      RBC 3.85 10*6/mm3      Hemoglobin 12.8 g/dL      Hematocrit 38.1 %      MCV 99.0 fL      MCH 33.4 pg      MCHC 33.7 g/dL      RDW 14.3 %      RDW-SD 49.0 fl      MPV 9.2 fL      Platelets 161 10*3/mm3      Neutrophil % 78.2 %      Lymphocyte % 12.8 %      Monocyte % 7.7 %      Eosinophil % 0.9 %      Basophil % 0.4 %      Neutrophils, Absolute 7.90 10*3/mm3      Lymphocytes, Absolute 1.30 10*3/mm3      Monocytes, Absolute 0.80 10*3/mm3      Eosinophils, Absolute 0.10 10*3/mm3      Basophils, Absolute 0.00 10*3/mm3      nRBC 0.0 /100 WBC     High Sensitivity Troponin T 2Hr [[id number]]  (Abnormal) Collected: 08/30/23 1932    Specimen: Blood Updated: 08/30/23 2004     HS Troponin T 49 ng/L      Troponin T Delta -3 ng/L     Narrative:      High Sensitive Troponin T Reference Range:  <10.0 ng/L- Negative Female for AMI  <15.0 ng/L- Negative Male for AMI  >=10 - Abnormal Female indicating possible myocardial injury.  >=15 - Abnormal Male indicating possible myocardial injury.   Clinicians would have to utilize clinical acumen, EKG, Troponin, and serial changes to determine if it is an Acute Myocardial Infarction or myocardial injury due to an underlying chronic condition.         Comprehensive Metabolic Panel [[id number]]  (Abnormal) Collected: 08/30/23 1734    Specimen: Blood Updated: 08/30/23 1906     Glucose 146 mg/dL      BUN 29 mg/dL      Creatinine 1.07 mg/dL      Sodium 140 mmol/L      Potassium 3.8 mmol/L      Chloride 100 mmol/L      CO2 31.0 mmol/L      Calcium 8.7 mg/dL      Total Protein 6.2 g/dL      Albumin 3.6 g/dL      ALT (SGPT) <5 U/L      AST (SGOT) 18 U/L      Alkaline Phosphatase 55 U/L       Total Bilirubin 0.4 mg/dL      Globulin 2.6 gm/dL      A/G Ratio 1.4 g/dL      BUN/Creatinine Ratio 27.1     Anion Gap 9.0 mmol/L      eGFR 63.5 mL/min/1.73     Narrative:      GFR Normal >60  Chronic Kidney Disease <60  Kidney Failure <15    The GFR formula is only valid for adults with stable renal function between ages 18 and 70.    TSH [[id number]]  (Normal) Collected: 08/30/23 1734    Specimen: Blood Updated: 08/30/23 1854     TSH 1.720 uIU/mL     Urinalysis With Culture If Indicated - Urine, Clean Catch [[id number]]  (Abnormal) Collected: 08/30/23 1823    Specimen: Urine, Clean Catch Updated: 08/30/23 1835     Color, UA Yellow     Appearance, UA Clear     pH, UA <=5.0     Specific Gravity, UA 1.016     Glucose, UA Negative     Ketones, UA Trace     Bilirubin, UA Negative     Blood, UA Negative     Protein, UA Negative     Leuk Esterase, UA Small (1+)     Nitrite, UA Negative     Urobilinogen, UA 0.2 E.U./dL    Narrative:      In absence of clinical symptoms, the presence of pyuria, bacteria, and/or nitrites on the urinalysis result does not correlate with infection.    Urinalysis, Microscopic Only - Urine, Clean Catch [[id number]]  (Abnormal) Collected: 08/30/23 1823    Specimen: Urine, Clean Catch Updated: 08/30/23 1835     RBC, UA 0-2 /HPF      WBC, UA 21-30 /HPF      Bacteria, UA 4+ /HPF      Squamous Epithelial Cells, UA 0-2 /HPF      Hyaline Casts, UA 3-6 /LPF      Methodology Automated Microscopy    Magnesium [[id number]]  (Normal) Collected: 08/30/23 1734    Specimen: Blood Updated: 08/30/23 1825     Magnesium 2.1 mg/dL     High Sensitivity Troponin T [[id number]]  (Abnormal) Collected: 08/30/23 1734    Specimen: Blood Updated: 08/30/23 1814     HS Troponin T 52 ng/L     Narrative:      High Sensitive Troponin T Reference Range:  <10.0 ng/L- Negative Female for AMI  <15.0 ng/L- Negative Male for AMI  >=10 - Abnormal Female indicating possible myocardial injury.  >=15 - Abnormal Male indicating possible  myocardial injury.   Clinicians would have to utilize clinical acumen, EKG, Troponin, and serial changes to determine if it is an Acute Myocardial Infarction or myocardial injury due to an underlying chronic condition.         Protime-INR [[id number]]  (Normal) Collected: 08/30/23 1734    Specimen: Blood Updated: 08/30/23 1756     Protime 11.0 Seconds      INR 1.03    aPTT [[id number]]  (Abnormal) Collected: 08/30/23 1734    Specimen: Blood Updated: 08/30/23 1756     PTT 26.5 seconds     CBC & Differential [[id number]]  (Abnormal) Collected: 08/30/23 1734    Specimen: Blood Updated: 08/30/23 1741    Narrative:      The following orders were created for panel order CBC & Differential.  Procedure                               Abnormality         Status                     ---------                               -----------         ------                     CBC Auto Differential[[id number]]        Abnormal            Final result                 Please view results for these tests on the individual orders.    CBC Auto Differential [[id number]]  (Abnormal) Collected: 08/30/23 1734    Specimen: Blood Updated: 08/30/23 1741     WBC 7.20 10*3/mm3      RBC 3.73 10*6/mm3      Hemoglobin 12.3 g/dL      Hematocrit 36.0 %      MCV 96.5 fL      MCH 33.1 pg      MCHC 34.3 g/dL      RDW 14.3 %      RDW-SD 50.8 fl      MPV 8.8 fL      Platelets 159 10*3/mm3      Neutrophil % 72.7 %      Lymphocyte % 16.8 %      Monocyte % 8.5 %      Eosinophil % 1.4 %      Basophil % 0.6 %      Neutrophils, Absolute 5.20 10*3/mm3      Lymphocytes, Absolute 1.20 10*3/mm3      Monocytes, Absolute 0.60 10*3/mm3      Eosinophils, Absolute 0.10 10*3/mm3      Basophils, Absolute 0.00 10*3/mm3      nRBC 0.0 /100 WBC           Data Review:  Results from last 7 days   Lab Units 08/31/23 0428 08/30/23 1734   SODIUM mmol/L 143 140   POTASSIUM mmol/L 3.8 3.8   CHLORIDE mmol/L 103 100   CO2 mmol/L 33.0* 31.0*   BUN mg/dL 24* 29*   CREATININE mg/dL 1.00 1.07    GLUCOSE mg/dL 94 146*   CALCIUM mg/dL 9.0 8.7     Results from last 7 days   Lab Units 08/31/23  0428 08/30/23  1734   WBC 10*3/mm3 10.10 7.20   HEMOGLOBIN g/dL 12.8* 12.3*   HEMATOCRIT % 38.1 36.0*   PLATELETS 10*3/mm3 161 159     Results from last 7 days   Lab Units 08/30/23  1734   TSH uIU/mL 1.720         Lab Results   Lab Value Date/Time    TROPONINT 56 (C) 08/31/2023 0428    TROPONINT 49 (H) 08/30/2023 1932    TROPONINT 52 (H) 08/30/2023 1734         Results from last 7 days   Lab Units 08/31/23  0428 08/30/23  1734   ALK PHOS U/L 52 55   BILIRUBIN mg/dL 0.5 0.4   ALT (SGPT) U/L 6 <5   AST (SGOT) U/L 21 18     Results from last 7 days   Lab Units 08/30/23  1734   TSH uIU/mL 1.720         No results found for: POCGLU  Results from last 7 days   Lab Units 08/30/23  1734   INR  1.03         Assessment:  Active Hospital Problems    Diagnosis  POA    **Syncope [R55]  Yes    Parkinson's disease [G20]  Unknown    HTN (hypertension) [I10]  Unknown    HLD (hyperlipidemia) [E78.5]  Unknown    CAD (coronary artery disease) [I25.10]  Unknown    CHF (congestive heart failure) [I50.9]  Unknown    Dehydration [E86.0]  Unknown      Resolved Hospital Problems   No resolved problems to display.       Plan:  Continue to hold Lasix.  Cultures all negative and we will stop antibiotics.  Await pre-CERT for rehab.  DC planning.  Probably we do not get pre-CERT today will be Tuesday before he can go to rehab.  He is excepted at [facility].    Mike Fong MD  9/1/2023  13:19 EDT

## 2023-09-01 NOTE — CASE MANAGEMENT/SOCIAL WORK
Discharge Planning Assessment   Rai     Patient Name: Jermaine Black  MRN: [id number]  Today's Date: 8/31/2023    Admit Date: 8/30/2023    Plan: PT recommending IP rehab currently. Referral sent to [facility] in [facility]. Will need precert. Needs PCP   Discharge Needs Assessment    No documentation.                  Discharge Plan       Row Name 08/31/23 1938       Plan    Plan PT recommending IP rehab currently. Referral sent to [facility] in [facility]. Will need precert. Needs PCP    Plan Comments  spoke with patient at bedside. He reports he lives with spouse and they have caregivers through [employer] (per [facility]). He reports he uses a rolling walker at home. He reports his pcp retired, and he doesn't have a new one except for his [facility] doctor. He confirms he wants enrolled in meds to bed. We discussed PT/OT current recommendations for IRF and he is agreeable. Provided list of IRF and SNFs to patient and discussed options.Per patient request, referral was sent in [facility] to [facility]. Will need to notify liason during regular business hours.Pt gave  permission to contact his daughter Rachel. I spoke with Rachel via phone. She confirmed patient's answers and is in agreement with referral to [facility] as per patient preference. She is aware PT/OT recommendations may change pending clinical course. She is also aware will require precert. She is in agreement with current plan. Informed her list for SNF and IRF are at patient's bedside if she would like to review them when she visits. She expressed understanding and appreciation. Informed her I would place phone number for patient connection hub on pt's AVS.                  Continued Care and Services - Admitted Since 8/30/2023       Destination       Service Provider Request Status Selected Services Address Phone Fax Patient Preferred    [facility] Pending - Request Sent N/A [address] [facility] [place] [address] [phone] --                      Patient Forms       Row Name 08/31/23 1959       Patient Forms    Important Message from Medicare ([facility]) --  RAINEY 8/30/23 per registration    Patient Observation Letter Delivered  RAINEY 8/30/23 per registration                  Due to high hospital census, dc planning documentation is limited to notes for this encounter    Daysi Quinteros RN, [facility]  Office: [phone]  Fax: [phone]  [email or web address]      I met with patient in room wearing PPE: mask and glasses     Maintained distance greater than six feet and spent </=15 minutes in the room    Daysi Quinteros RN

## 2023-09-01 NOTE — CASE MANAGEMENT/SOCIAL WORK
Continued Stay Note   Rai     Patient Name: Jermaine Black  MRN: [id number]  Today's Date: 9/1/2023    Admit Date: 8/30/2023    Plan: Accepted at [facility]. Precert pending.   Discharge Plan       Row Name 09/01/23 1232       Plan    Plan Comments Notified by nursing that pt's daughter had questions regarding post rehab plans.  spoke with  pt's daughter Rachel. Provided with resources for in home care post rehab. Rehab / will also follow for post rehab needs.      Row Name 09/01/23 0947       Plan    Plan Accepted at [facility]. Precert pending.    Plan Comments Notified by [facility] liason that patient has been accepted to [facility] pending precert. She will get precert started after speaking with family. DC Barriers: IV antibiotics; IRF precert pending                   Discharge Codes    No documentation.                 Daysi Quinteros RN, [facility]  Office: [phone]  Fax: [phone]  [email or web address]      I met with patient in room wearing PPE: mask and glasses     Maintained distance greater than six feet and spent </=15 minutes in the room      Daysi Quinteros RN

## 2023-09-01 NOTE — CASE MANAGEMENT/SOCIAL WORK
Continued Stay Note   Rai     Patient Name: Jermaine Black  MRN: [id number]  Today's Date: 9/1/2023    Admit Date: 8/30/2023    Plan: Accepted at [facility]. Precert pending.   Discharge Plan       Row Name 09/01/23 0947       Plan    Plan Accepted at [facility]. Precert pending.    Plan Comments Notified by [facility] jimmie that patient has been accepted to [facility] pending precert. She will get precert started after speaking with family. DC Barriers: IV antibiotics; IRF precert pending                   Discharge Codes    No documentation.                   Daysi Quinteros RN, [facility]  Office: [phone]  Fax: [phone]  [email or web address]      Phone communication or documentation only - no physical contact with patient or family.    Daysi Quinteros RN

## 2023-09-01 NOTE — PLAN OF CARE
Assessment: Jermaine Black presents with functional mobility impairments which indicate the need for skilled intervention. Tolerating session today without incident. Pt progresses well with therapy this session, but still shows some signs of confusion, such as not remember session from previous day. Pt completes bed mobility CGA this session, completes STS transfer min A, and amb 8' CGA>min A with HHA and other UE holding on to bed railing. He amb with festinating gait and decreased step length this session, but overall shows improved activity tolerance. He completes ther ex EOB and was educated on HEP in bed to prevent muscle atrophy in during stay. Will continue to follow and progress as tolerated.

## 2023-09-01 NOTE — PLAN OF CARE
Problem: Fall Injury Risk  Goal: Absence of Fall and Fall-Related Injury  Outcome: Ongoing, Progressing  Intervention: Identify and Manage Contributors  Recent Flowsheet Documentation  Taken 9/1/2023 1724 by Sintia De La O LPN  Medication Review/Management: medications reviewed  Intervention: Promote Injury-Free Environment  Recent Flowsheet Documentation  Taken 9/1/2023 1724 by [place], Sintia A, LPN  Safety Promotion/Fall Prevention:   activity supervised   assistive device/personal items within reach   clutter free environment maintained   fall prevention program maintained   nonskid shoes/slippers when out of bed   room organization consistent   safety round/check completed     Problem: Heart Failure Comorbidity  Goal: Maintenance of Heart Failure Symptom Control  Outcome: Ongoing, Progressing  Intervention: Maintain Heart Failure-Management  Recent Flowsheet Documentation  Taken 9/1/2023 1724 by Sintia De La O LPN  Medication Review/Management: medications reviewed     Problem: Hypertension Comorbidity  Goal: Blood Pressure in Desired Range  Outcome: Ongoing, Progressing  Intervention: Maintain Blood Pressure Management  Recent Flowsheet Documentation  Taken 9/1/2023 1724 by Sintia De La O LPN  Medication Review/Management: medications reviewed     Problem: Skin Injury Risk Increased  Goal: Skin Health and Integrity  Outcome: Ongoing, Progressing   Goal Outcome Evaluation:

## 2023-09-02 LAB
ALBUMIN SERPL-MCNC: 3.1 G/DL (ref 3.5–5.2)
ALBUMIN/GLOB SERPL: 1.3 G/DL
ALP SERPL-CCNC: 61 U/L (ref 39–117)
ALT SERPL W P-5'-P-CCNC: <5 U/L (ref 1–41)
ANION GAP SERPL CALCULATED.3IONS-SCNC: 6 MMOL/L (ref 5–15)
AST SERPL-CCNC: 18 U/L (ref 1–40)
BASOPHILS # BLD AUTO: 0 10*3/MM3 (ref 0–0.2)
BASOPHILS NFR BLD AUTO: 0.3 % (ref 0–1.5)
BILIRUB SERPL-MCNC: 0.4 MG/DL (ref 0–1.2)
BUN SERPL-MCNC: 20 MG/DL (ref 8–23)
BUN/CREAT SERPL: 19.6 (ref 7–25)
CALCIUM SPEC-SCNC: 8.7 MG/DL (ref 8.2–9.6)
CHLORIDE SERPL-SCNC: 106 MMOL/L (ref 98–107)
CO2 SERPL-SCNC: 29 MMOL/L (ref 22–29)
CREAT SERPL-MCNC: 1.02 MG/DL (ref 0.76–1.27)
DEPRECATED RDW RBC AUTO: 51.2 FL (ref 37–54)
EGFRCR SERPLBLD CKD-EPI 2021: 67.3 ML/MIN/1.73
EOSINOPHIL # BLD AUTO: 0.2 10*3/MM3 (ref 0–0.4)
EOSINOPHIL NFR BLD AUTO: 2.6 % (ref 0.3–6.2)
ERYTHROCYTE [DISTWIDTH] IN BLOOD BY AUTOMATED COUNT: 14.4 % (ref 12.3–15.4)
GLOBULIN UR ELPH-MCNC: 2.3 GM/DL
GLUCOSE SERPL-MCNC: 95 MG/DL (ref 65–99)
HCT VFR BLD AUTO: 36 % (ref 37.5–51)
HGB BLD-MCNC: 12.1 G/DL (ref 13–17.7)
LYMPHOCYTES # BLD AUTO: 1.6 10*3/MM3 (ref 0.7–3.1)
LYMPHOCYTES NFR BLD AUTO: 18.8 % (ref 19.6–45.3)
MAGNESIUM SERPL-MCNC: 2.1 MG/DL (ref 1.7–2.3)
MCH RBC QN AUTO: 32.6 PG (ref 26.6–33)
MCHC RBC AUTO-ENTMCNC: 33.6 G/DL (ref 31.5–35.7)
MCV RBC AUTO: 96.9 FL (ref 79–97)
MONOCYTES # BLD AUTO: 0.7 10*3/MM3 (ref 0.1–0.9)
MONOCYTES NFR BLD AUTO: 7.9 % (ref 5–12)
NEUTROPHILS NFR BLD AUTO: 5.9 10*3/MM3 (ref 1.7–7)
NEUTROPHILS NFR BLD AUTO: 70.4 % (ref 42.7–76)
NRBC BLD AUTO-RTO: 0.1 /100 WBC (ref 0–0.2)
PHOSPHATE SERPL-MCNC: 2.9 MG/DL (ref 2.5–4.5)
PLATELET # BLD AUTO: 144 10*3/MM3 (ref 140–450)
PMV BLD AUTO: 9.4 FL (ref 6–12)
POTASSIUM SERPL-SCNC: 4.4 MMOL/L (ref 3.5–5.2)
PROT SERPL-MCNC: 5.4 G/DL (ref 6–8.5)
RBC # BLD AUTO: 3.72 10*6/MM3 (ref 4.14–5.8)
SODIUM SERPL-SCNC: 141 MMOL/L (ref 136–145)
WBC NRBC COR # BLD: 8.4 10*3/MM3 (ref 3.4–10.8)

## 2023-09-02 PROCEDURE — G0378 HOSPITAL OBSERVATION PER HR: HCPCS

## 2023-09-02 PROCEDURE — 85025 COMPLETE CBC W/AUTO DIFF WBC: CPT | Performed by: HOSPITALIST

## 2023-09-02 PROCEDURE — 97535 SELF CARE MNGMENT TRAINING: CPT

## 2023-09-02 PROCEDURE — 83735 ASSAY OF MAGNESIUM: CPT | Performed by: INTERNAL MEDICINE

## 2023-09-02 PROCEDURE — 84100 ASSAY OF PHOSPHORUS: CPT | Performed by: INTERNAL MEDICINE

## 2023-09-02 PROCEDURE — 97112 NEUROMUSCULAR REEDUCATION: CPT

## 2023-09-02 PROCEDURE — 80053 COMPREHEN METABOLIC PANEL: CPT | Performed by: INTERNAL MEDICINE

## 2023-09-02 PROCEDURE — 97116 GAIT TRAINING THERAPY: CPT

## 2023-09-02 RX ADMIN — SENNOSIDES AND DOCUSATE SODIUM 2 TABLET: 50; 8.6 TABLET ORAL at 08:19

## 2023-09-02 RX ADMIN — Medication 10 ML: at 20:07

## 2023-09-02 RX ADMIN — CARBIDOPA AND LEVODOPA 1 TABLET: 25; 100 TABLET, EXTENDED RELEASE ORAL at 20:06

## 2023-09-02 RX ADMIN — ROSUVASTATIN 10 MG: 10 TABLET, FILM COATED ORAL at 08:19

## 2023-09-02 RX ADMIN — ISOSORBIDE MONONITRATE 60 MG: 60 TABLET, EXTENDED RELEASE ORAL at 08:19

## 2023-09-02 RX ADMIN — CARBIDOPA AND LEVODOPA 1 TABLET: 25; 100 TABLET, EXTENDED RELEASE ORAL at 16:57

## 2023-09-02 RX ADMIN — ERYTHROMYCIN 1 APPLICATION: 5 OINTMENT OPHTHALMIC at 20:06

## 2023-09-02 RX ADMIN — Medication 10 ML: at 08:28

## 2023-09-02 RX ADMIN — METOPROLOL SUCCINATE 50 MG: 50 TABLET, EXTENDED RELEASE ORAL at 08:19

## 2023-09-02 RX ADMIN — FINASTERIDE 5 MG: 5 TABLET, FILM COATED ORAL at 08:19

## 2023-09-02 RX ADMIN — LEVOTHYROXINE SODIUM 75 MCG: 0.12 TABLET ORAL at 05:31

## 2023-09-02 RX ADMIN — TAMSULOSIN HYDROCHLORIDE 0.4 MG: 0.4 CAPSULE ORAL at 08:19

## 2023-09-02 RX ADMIN — CARBIDOPA AND LEVODOPA 1 TABLET: 25; 100 TABLET, EXTENDED RELEASE ORAL at 08:19

## 2023-09-02 RX ADMIN — CHOLESTYRAMINE 4 G: 4 POWDER, FOR SUSPENSION ORAL at 08:19

## 2023-09-02 NOTE — PLAN OF CARE
Assessment: Jermaine Black presents with functional mobility impairments which indicate the need for skilled intervention. Tolerating session today without incident. Gave cues on how to improve mobility and get feedback more when using rwx to let him know where floor was and objects around him, let him bump into things so he could figure out how to get around them. Having difficulty finding food on plate and stated they would work wit him at rehab for possible plates with guards, different colored plates,etc. Presented with shuffle steps due to Parkinsons. Plans on acute rehab at [place].Will continue to follow and progress as tolerated.

## 2023-09-02 NOTE — PLAN OF CARE
Problem: Fall Injury Risk  Goal: Absence of Fall and Fall-Related Injury  Outcome: Ongoing, Progressing  Intervention: Identify and Manage Contributors  Recent Flowsheet Documentation  Taken 9/2/2023 1615 by Josselin Cheema LPN  Medication Review/Management: medications reviewed  Taken 9/2/2023 1400 by Josselin Cheema LPN  Medication Review/Management: medications reviewed  Intervention: Promote Injury-Free Environment  Recent Flowsheet Documentation  Taken 9/2/2023 1615 by Josselin Cheema LPN  Safety Promotion/Fall Prevention: safety round/check completed  Taken 9/2/2023 1400 by Josselin Cheema LPN  Safety Promotion/Fall Prevention: safety round/check completed     Problem: Heart Failure Comorbidity  Goal: Maintenance of Heart Failure Symptom Control  Outcome: Ongoing, Progressing  Intervention: Maintain Heart Failure-Management  Recent Flowsheet Documentation  Taken 9/2/2023 1615 by Josselin Cheema LPN  Medication Review/Management: medications reviewed  Taken 9/2/2023 1400 by Josselin Cheema LPN  Medication Review/Management: medications reviewed     Problem: Hypertension Comorbidity  Goal: Blood Pressure in Desired Range  Outcome: Ongoing, Progressing  Intervention: Maintain Blood Pressure Management  Recent Flowsheet Documentation  Taken 9/2/2023 1615 by Josselin Cheema LPN  Medication Review/Management: medications reviewed  Taken 9/2/2023 1400 by Josselin Cheema LPN  Medication Review/Management: medications reviewed     Problem: Skin Injury Risk Increased  Goal: Skin Health and Integrity  Outcome: Ongoing, Progressing   Goal Outcome Evaluation:

## 2023-09-02 NOTE — THERAPY TREATMENT NOTE
Subjective: Pt agreeable to therapeutic plan of care.    Objective:     Bed mobility - SBA  Transfers - CGA and with rolling walker  Ambulation - 40 feet CGA and with rolling walker and vc's for directions due to poor vision    Vitals: WNL    Pain: 0 VAS       Education: Provided education on the importance of mobility in the acute care setting, Verbal/Tactile Cues, ADL training, Transfer Training, and Gait Training    Assessment: Jermaine Black presents with functional mobility impairments which indicate the need for skilled intervention. Tolerating session today without incident. Gave cues on how to improve mobility and get feedback more when using rwx to let him know where floor was and objects around him, let him bump into things so he could figure out how to get around them. Having difficulty finding food on plate and stated they would work wit him at rehab for possible plates with guards, different colored plates,etc. Presented with shuffle steps due to Parkinsons. Plans on acute rehab at [place].Will continue to follow and progress as tolerated.     Plan/Recommendations:   High Intensity Therapy recommended post-acute care. This is recommended as therapy feels the patient would require 5-6 days per week, 2-3 hours per day. At this time, inpatient rehabilitation (acute rehab) would be the first choice and SNF would be second.. Pt requires no DME at discharge.     Pt desires Inpatient Rehabilitation placement at discharge. Pt cooperative; agreeable to therapeutic recommendations and plan of care.         Basic Mobility 6-click:  Rollin = Total, A lot = 2, A little = 3; 4 = None  Supine>Sit:   1 = Total, A lot = 2, A little = 3; 4 = None   Sit>Stand with arms:  1 = Total, A lot = 2, A little = 3; 4 = None  Bed>Chair:   1 = Total, A lot = 2, A little = 3; 4 = None  Ambulate in room:  1 = Total, A lot = 2, A little = 3; 4 = None  3-5 Steps with railin = Total, A lot = 2, A little = 3; 4 =  None  Score: 18    Modified [place]: 4 = Moderately severe disability (Unable to attend to own bodily needs without assistance, and unable to walk unassisted)     Post-Tx Position: supine,  head elevated, call light in reach  PPE: gloves

## 2023-09-02 NOTE — PLAN OF CARE
Problem: Fall Injury Risk  Goal: Absence of Fall and Fall-Related Injury  Outcome: Ongoing, Progressing  Intervention: Identify and Manage Contributors  Recent Flowsheet Documentation  Taken 9/2/2023 1145 by Sintia De La O LPN  Medication Review/Management: medications reviewed  Taken 9/2/2023 0819 by Sintia De La O LPN  Medication Review/Management: medications reviewed  Intervention: Promote Injury-Free Environment  Recent Flowsheet Documentation  Taken 9/2/2023 1145 by Partridge, Sintia A, LPN  Safety Promotion/Fall Prevention:   activity supervised   clutter free environment maintained   assistive device/personal items within reach   fall prevention program maintained   nonskid shoes/slippers when out of bed   room organization consistent   safety round/check completed  Taken 9/2/2023 0819 by Partridge, Sintia A, LPN  Safety Promotion/Fall Prevention:   assistive device/personal items within reach   clutter free environment maintained   activity supervised   fall prevention program maintained   nonskid shoes/slippers when out of bed   room organization consistent   safety round/check completed     Problem: Heart Failure Comorbidity  Goal: Maintenance of Heart Failure Symptom Control  Outcome: Ongoing, Progressing  Intervention: Maintain Heart Failure-Management  Recent Flowsheet Documentation  Taken 9/2/2023 1145 by Sintia De La O LPN  Medication Review/Management: medications reviewed  Taken 9/2/2023 0819 by Sintia De La O LPN  Medication Review/Management: medications reviewed     Problem: Hypertension Comorbidity  Goal: Blood Pressure in Desired Range  Outcome: Ongoing, Progressing  Intervention: Maintain Blood Pressure Management  Recent Flowsheet Documentation  Taken 9/2/2023 1145 by Sintia De La O LPN  Medication Review/Management: medications reviewed  Taken 9/2/2023 0819 by Sintia De La O LPN  Medication Review/Management: medications reviewed     Problem: Skin Injury Risk  Increased  Goal: Skin Health and Integrity  Outcome: Ongoing, Progressing   Goal Outcome Evaluation:

## 2023-09-02 NOTE — PLAN OF CARE
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  He states his wife is helpful to managing how he moves about  Discussed the importance of using scds to prevent blood clotting;   Also discussed the importance of turning from side to side to prevent skin breakdown;  Placed a mepilex over his coccyx area r/t area is pink;

## 2023-09-02 NOTE — PROGRESS NOTES
"DAILY PROGRESS NOTE  [facility]    Patient Identification:  Name: Jermaine Black  Age: 89 y.o.  Sex: male  :  1926  MRN: [id number]         Primary Care Physician: Provider, No Known    Subjective:  Interval History: He is oriented and talking today. Had a BM. Eating well. Pending [facility]. Off abx.     Objective:    Scheduled Meds:carbidopa-levodopa ER, 1 tablet, Oral, TID  cholestyramine light, 1 packet, Oral, Daily  erythromycin, 1 application , Both Eyes, Nightly  finasteride, 5 mg, Oral, Daily  isosorbide mononitrate, 60 mg, Oral, Daily  levothyroxine, 75 mcg, Oral, Q AM  metoprolol succinate XL, 50 mg, Oral, Daily  rosuvastatin, 10 mg, Oral, Daily  senna-docusate sodium, 2 tablet, Oral, BID  sodium chloride, 10 mL, Intravenous, Q12H  tamsulosin, 0.4 mg, Oral, Daily      Continuous Infusions:     Vital signs in last 24 hours:  Temp:  [97.5 °F (36.4 °C)-98.2 °F (36.8 °C)] 97.6 °F (36.4 °C)  Heart Rate:  [70] 70  Resp:  [16-19] 18  BP: (102-167)/(54-99) 143/99    Intake/Output:    Intake/Output Summary (Last 24 hours) at 2023 1515  Last data filed at 2023 0900  Gross per 24 hour   Intake 320 ml   Output 150 ml   Net 170 ml       Exam:  /99 (BP Location: Right arm, Patient Position: Lying)   Pulse 70   Temp 97.6 °F (36.4 °C) (Oral)   Resp 18   Ht 167.6 cm (66\")   Wt 86.2 kg (190 lb)   SpO2 96%   BMI 30.67 kg/m²     General Appearance:    Alert, cooperative, no distress   Head:    Normocephalic, without obvious abnormality, atraumatic   Eyes:       Throat:   Lips, tongue, gums normal   Neck:   Supple, symmetrical, trachea midline, no JVD   Lungs:     Clear to auscultation bilaterally, respirations unlabored   Chest Wall:    No tenderness or deformity    Heart:    Regular rate and rhythm, S1 and S2 normal, no murmur,no  rub or gallop   Abdomen:     Soft, nontender, bowel sounds active, no masses, no organomegaly    Extremities:   Extremities normal, atraumatic, no cyanosis or " edema   Pulses:      Skin:   Skin is warm and dry,  no rashes or palpable lesions   Neurologic: He is very weak and has Parkinson's      Lab Results (last 72 hours)       Procedure Component Value Units Date/Time    Urine Culture - Urine, Urine, Clean Catch [[id number]]  (Normal) Collected: 08/30/23 1823    Specimen: Urine, Clean Catch Updated: 08/31/23 2003     Urine Culture No growth    Blood Culture - Blood, Wrist, Right [[id number]]  (Normal) Collected: 08/30/23 1932    Specimen: Blood from Wrist, Right Updated: 08/31/23 1946     Blood Culture No growth at 24 hours    Narrative:      Less than seven (7) mL's of blood was collected.  Insufficient quantity may yield false negative results.    Blood Culture - Blood, Arm, Right [[id number]]  (Normal) Collected: 08/30/23 1932    Specimen: Blood from Arm, Right Updated: 08/31/23 1946     Blood Culture No growth at 24 hours    High Sensitivity Troponin T [[id number]]  (Abnormal) Collected: 08/31/23 0428    Specimen: Blood Updated: 08/31/23 0526     HS Troponin T 56 ng/L     Narrative:      High Sensitive Troponin T Reference Range:  <10.0 ng/L- Negative Female for AMI  <15.0 ng/L- Negative Male for AMI  >=10 - Abnormal Female indicating possible myocardial injury.  >=15 - Abnormal Male indicating possible myocardial injury.   Clinicians would have to utilize clinical acumen, EKG, Troponin, and serial changes to determine if it is an Acute Myocardial Infarction or myocardial injury due to an underlying chronic condition.         Comprehensive Metabolic Panel [[id number]]  (Abnormal) Collected: 08/31/23 0428    Specimen: Blood Updated: 08/31/23 0517     Glucose 94 mg/dL      BUN 24 mg/dL      Creatinine 1.00 mg/dL      Sodium 143 mmol/L      Potassium 3.8 mmol/L      Chloride 103 mmol/L      CO2 33.0 mmol/L      Calcium 9.0 mg/dL      Total Protein 6.1 g/dL      Albumin 3.6 g/dL      ALT (SGPT) 6 U/L      AST (SGOT) 21 U/L      Alkaline Phosphatase 52 U/L      Total Bilirubin  0.5 mg/dL      Globulin 2.5 gm/dL      A/G Ratio 1.4 g/dL      BUN/Creatinine Ratio 24.0     Anion Gap 7.0 mmol/L      eGFR 68.9 mL/min/1.73     Narrative:      GFR Normal >60  Chronic Kidney Disease <60  Kidney Failure <15    The GFR formula is only valid for adults with stable renal function between ages 18 and 70.    CBC Auto Differential [[id number]]  (Abnormal) Collected: 08/31/23 0428    Specimen: Blood Updated: 08/31/23 0459     WBC 10.10 10*3/mm3      RBC 3.85 10*6/mm3      Hemoglobin 12.8 g/dL      Hematocrit 38.1 %      MCV 99.0 fL      MCH 33.4 pg      MCHC 33.7 g/dL      RDW 14.3 %      RDW-SD 49.0 fl      MPV 9.2 fL      Platelets 161 10*3/mm3      Neutrophil % 78.2 %      Lymphocyte % 12.8 %      Monocyte % 7.7 %      Eosinophil % 0.9 %      Basophil % 0.4 %      Neutrophils, Absolute 7.90 10*3/mm3      Lymphocytes, Absolute 1.30 10*3/mm3      Monocytes, Absolute 0.80 10*3/mm3      Eosinophils, Absolute 0.10 10*3/mm3      Basophils, Absolute 0.00 10*3/mm3      nRBC 0.0 /100 WBC     High Sensitivity Troponin T 2Hr [[id number]]  (Abnormal) Collected: 08/30/23 1932    Specimen: Blood Updated: 08/30/23 2004     HS Troponin T 49 ng/L      Troponin T Delta -3 ng/L     Narrative:      High Sensitive Troponin T Reference Range:  <10.0 ng/L- Negative Female for AMI  <15.0 ng/L- Negative Male for AMI  >=10 - Abnormal Female indicating possible myocardial injury.  >=15 - Abnormal Male indicating possible myocardial injury.   Clinicians would have to utilize clinical acumen, EKG, Troponin, and serial changes to determine if it is an Acute Myocardial Infarction or myocardial injury due to an underlying chronic condition.         Comprehensive Metabolic Panel [[id number]]  (Abnormal) Collected: 08/30/23 1734    Specimen: Blood Updated: 08/30/23 1906     Glucose 146 mg/dL      BUN 29 mg/dL      Creatinine 1.07 mg/dL      Sodium 140 mmol/L      Potassium 3.8 mmol/L      Chloride 100 mmol/L      CO2 31.0 mmol/L       Calcium 8.7 mg/dL      Total Protein 6.2 g/dL      Albumin 3.6 g/dL      ALT (SGPT) <5 U/L      AST (SGOT) 18 U/L      Alkaline Phosphatase 55 U/L      Total Bilirubin 0.4 mg/dL      Globulin 2.6 gm/dL      A/G Ratio 1.4 g/dL      BUN/Creatinine Ratio 27.1     Anion Gap 9.0 mmol/L      eGFR 63.5 mL/min/1.73     Narrative:      GFR Normal >60  Chronic Kidney Disease <60  Kidney Failure <15    The GFR formula is only valid for adults with stable renal function between ages 18 and 70.    TSH [[id number]]  (Normal) Collected: 08/30/23 1734    Specimen: Blood Updated: 08/30/23 1854     TSH 1.720 uIU/mL     Urinalysis With Culture If Indicated - Urine, Clean Catch [[id number]]  (Abnormal) Collected: 08/30/23 1823    Specimen: Urine, Clean Catch Updated: 08/30/23 1835     Color, UA Yellow     Appearance, UA Clear     pH, UA <=5.0     Specific Gravity, UA 1.016     Glucose, UA Negative     Ketones, UA Trace     Bilirubin, UA Negative     Blood, UA Negative     Protein, UA Negative     Leuk Esterase, UA Small (1+)     Nitrite, UA Negative     Urobilinogen, UA 0.2 E.U./dL    Narrative:      In absence of clinical symptoms, the presence of pyuria, bacteria, and/or nitrites on the urinalysis result does not correlate with infection.    Urinalysis, Microscopic Only - Urine, Clean Catch [[id number]]  (Abnormal) Collected: 08/30/23 1823    Specimen: Urine, Clean Catch Updated: 08/30/23 1835     RBC, UA 0-2 /HPF      WBC, UA 21-30 /HPF      Bacteria, UA 4+ /HPF      Squamous Epithelial Cells, UA 0-2 /HPF      Hyaline Casts, UA 3-6 /LPF      Methodology Automated Microscopy    Magnesium [[id number]]  (Normal) Collected: 08/30/23 1734    Specimen: Blood Updated: 08/30/23 1825     Magnesium 2.1 mg/dL     High Sensitivity Troponin T [[id number]]  (Abnormal) Collected: 08/30/23 1734    Specimen: Blood Updated: 08/30/23 1814     HS Troponin T 52 ng/L     Narrative:      High Sensitive Troponin T Reference Range:  <10.0 ng/L- Negative Female  for AMI  <15.0 ng/L- Negative Male for AMI  >=10 - Abnormal Female indicating possible myocardial injury.  >=15 - Abnormal Male indicating possible myocardial injury.   Clinicians would have to utilize clinical acumen, EKG, Troponin, and serial changes to determine if it is an Acute Myocardial Infarction or myocardial injury due to an underlying chronic condition.         Protime-INR [[id number]]  (Normal) Collected: 08/30/23 1734    Specimen: Blood Updated: 08/30/23 1756     Protime 11.0 Seconds      INR 1.03    aPTT [[id number]]  (Abnormal) Collected: 08/30/23 1734    Specimen: Blood Updated: 08/30/23 1756     PTT 26.5 seconds     CBC & Differential [[id number]]  (Abnormal) Collected: 08/30/23 1734    Specimen: Blood Updated: 08/30/23 1741    Narrative:      The following orders were created for panel order CBC & Differential.  Procedure                               Abnormality         Status                     ---------                               -----------         ------                     CBC Auto Differential[[id number]]        Abnormal            Final result                 Please view results for these tests on the individual orders.    CBC Auto Differential [[id number]]  (Abnormal) Collected: 08/30/23 1734    Specimen: Blood Updated: 08/30/23 1741     WBC 7.20 10*3/mm3      RBC 3.73 10*6/mm3      Hemoglobin 12.3 g/dL      Hematocrit 36.0 %      MCV 96.5 fL      MCH 33.1 pg      MCHC 34.3 g/dL      RDW 14.3 %      RDW-SD 50.8 fl      MPV 8.8 fL      Platelets 159 10*3/mm3      Neutrophil % 72.7 %      Lymphocyte % 16.8 %      Monocyte % 8.5 %      Eosinophil % 1.4 %      Basophil % 0.6 %      Neutrophils, Absolute 5.20 10*3/mm3      Lymphocytes, Absolute 1.20 10*3/mm3      Monocytes, Absolute 0.60 10*3/mm3      Eosinophils, Absolute 0.10 10*3/mm3      Basophils, Absolute 0.00 10*3/mm3      nRBC 0.0 /100 WBC           Data Review:  Results from last 7 days   Lab Units 09/02/23  0638 08/31/23  0423  08/30/23  1734   SODIUM mmol/L 141 143 140   POTASSIUM mmol/L 4.4 3.8 3.8   CHLORIDE mmol/L 106 103 100   CO2 mmol/L 29.0 33.0* 31.0*   BUN mg/dL 20 24* 29*   CREATININE mg/dL 1.02 1.00 1.07   GLUCOSE mg/dL 95 94 146*   CALCIUM mg/dL 8.7 9.0 8.7       Results from last 7 days   Lab Units 09/02/23  0638 08/31/23  0428 08/30/23  1734   WBC 10*3/mm3 8.40 10.10 7.20   HEMOGLOBIN g/dL 12.1* 12.8* 12.3*   HEMATOCRIT % 36.0* 38.1 36.0*   PLATELETS 10*3/mm3 144 161 159       Results from last 7 days   Lab Units 08/30/23  1734   TSH uIU/mL 1.720           Lab Results   Lab Value Date/Time    TROPONINT 56 (C) 08/31/2023 0428    TROPONINT 49 (H) 08/30/2023 1932    TROPONINT 52 (H) 08/30/2023 1734         Results from last 7 days   Lab Units 09/02/23  0638 08/31/23  0428 08/30/23  1734   ALK PHOS U/L 61 52 55   BILIRUBIN mg/dL 0.4 0.5 0.4   ALT (SGPT) U/L <5 6 <5   AST (SGOT) U/L 18 21 18       Results from last 7 days   Lab Units 08/30/23  1734   TSH uIU/mL 1.720           No results found for: POCGLU  Results from last 7 days   Lab Units 08/30/23  1734   INR  1.03           Assessment:  Active Hospital Problems    Diagnosis  POA    **Syncope [R55]  Yes    Parkinson's disease [G20]  Unknown    HTN (hypertension) [I10]  Unknown    HLD (hyperlipidemia) [E78.5]  Unknown    CAD (coronary artery disease) [I25.10]  Unknown    CHF (congestive heart failure) [I50.9]  Unknown    Dehydration [E86.0]  Unknown      Resolved Hospital Problems   No resolved problems to display.       Plan:  Continue to hold Lasix.  Cultures all negative and we stopped antibiotics.  Await pre-CERT for rehab at [facility].  DC planning. Discussed with family at bedside     Bradley Hewitt MD  9/2/2023  15:15 EDT

## 2023-09-03 LAB
ALBUMIN SERPL-MCNC: 3.3 G/DL (ref 3.5–5.2)
ALBUMIN/GLOB SERPL: 1.5 G/DL
ALP SERPL-CCNC: 58 U/L (ref 39–117)
ALT SERPL W P-5'-P-CCNC: <5 U/L (ref 1–41)
ANION GAP SERPL CALCULATED.3IONS-SCNC: 6 MMOL/L (ref 5–15)
AST SERPL-CCNC: 15 U/L (ref 1–40)
BASOPHILS # BLD AUTO: 0 10*3/MM3 (ref 0–0.2)
BASOPHILS NFR BLD AUTO: 0.5 % (ref 0–1.5)
BILIRUB SERPL-MCNC: 0.4 MG/DL (ref 0–1.2)
BUN SERPL-MCNC: 18 MG/DL (ref 8–23)
BUN/CREAT SERPL: 20.5 (ref 7–25)
CALCIUM SPEC-SCNC: 8.5 MG/DL (ref 8.2–9.6)
CHLORIDE SERPL-SCNC: 106 MMOL/L (ref 98–107)
CO2 SERPL-SCNC: 29 MMOL/L (ref 22–29)
CREAT SERPL-MCNC: 0.88 MG/DL (ref 0.76–1.27)
DEPRECATED RDW RBC AUTO: 48.1 FL (ref 37–54)
EGFRCR SERPLBLD CKD-EPI 2021: 78.7 ML/MIN/1.73
EOSINOPHIL # BLD AUTO: 0.2 10*3/MM3 (ref 0–0.4)
EOSINOPHIL NFR BLD AUTO: 2.5 % (ref 0.3–6.2)
ERYTHROCYTE [DISTWIDTH] IN BLOOD BY AUTOMATED COUNT: 14.1 % (ref 12.3–15.4)
GLOBULIN UR ELPH-MCNC: 2.2 GM/DL
GLUCOSE SERPL-MCNC: 106 MG/DL (ref 65–99)
HCT VFR BLD AUTO: 37.5 % (ref 37.5–51)
HGB BLD-MCNC: 12.4 G/DL (ref 13–17.7)
LYMPHOCYTES # BLD AUTO: 1.7 10*3/MM3 (ref 0.7–3.1)
LYMPHOCYTES NFR BLD AUTO: 19.3 % (ref 19.6–45.3)
MAGNESIUM SERPL-MCNC: 2 MG/DL (ref 1.7–2.3)
MCH RBC QN AUTO: 32.5 PG (ref 26.6–33)
MCHC RBC AUTO-ENTMCNC: 33 G/DL (ref 31.5–35.7)
MCV RBC AUTO: 98.6 FL (ref 79–97)
MONOCYTES # BLD AUTO: 0.7 10*3/MM3 (ref 0.1–0.9)
MONOCYTES NFR BLD AUTO: 8.2 % (ref 5–12)
NEUTROPHILS NFR BLD AUTO: 6.2 10*3/MM3 (ref 1.7–7)
NEUTROPHILS NFR BLD AUTO: 69.5 % (ref 42.7–76)
NRBC BLD AUTO-RTO: 0 /100 WBC (ref 0–0.2)
PHOSPHATE SERPL-MCNC: 2.9 MG/DL (ref 2.5–4.5)
PLATELET # BLD AUTO: 158 10*3/MM3 (ref 140–450)
PMV BLD AUTO: 9.2 FL (ref 6–12)
POTASSIUM SERPL-SCNC: 4.1 MMOL/L (ref 3.5–5.2)
PROT SERPL-MCNC: 5.5 G/DL (ref 6–8.5)
RBC # BLD AUTO: 3.8 10*6/MM3 (ref 4.14–5.8)
SODIUM SERPL-SCNC: 141 MMOL/L (ref 136–145)
WBC NRBC COR # BLD: 8.9 10*3/MM3 (ref 3.4–10.8)

## 2023-09-03 PROCEDURE — G0378 HOSPITAL OBSERVATION PER HR: HCPCS

## 2023-09-03 PROCEDURE — 83735 ASSAY OF MAGNESIUM: CPT | Performed by: INTERNAL MEDICINE

## 2023-09-03 PROCEDURE — 80053 COMPREHEN METABOLIC PANEL: CPT | Performed by: INTERNAL MEDICINE

## 2023-09-03 PROCEDURE — 85025 COMPLETE CBC W/AUTO DIFF WBC: CPT | Performed by: INTERNAL MEDICINE

## 2023-09-03 PROCEDURE — 84100 ASSAY OF PHOSPHORUS: CPT | Performed by: INTERNAL MEDICINE

## 2023-09-03 RX ADMIN — SENNOSIDES AND DOCUSATE SODIUM 2 TABLET: 50; 8.6 TABLET ORAL at 20:28

## 2023-09-03 RX ADMIN — SENNOSIDES AND DOCUSATE SODIUM 2 TABLET: 50; 8.6 TABLET ORAL at 08:47

## 2023-09-03 RX ADMIN — CARBIDOPA AND LEVODOPA 1 TABLET: 25; 100 TABLET, EXTENDED RELEASE ORAL at 17:14

## 2023-09-03 RX ADMIN — ERYTHROMYCIN 1 APPLICATION: 5 OINTMENT OPHTHALMIC at 20:28

## 2023-09-03 RX ADMIN — FINASTERIDE 5 MG: 5 TABLET, FILM COATED ORAL at 08:47

## 2023-09-03 RX ADMIN — Medication 10 ML: at 08:48

## 2023-09-03 RX ADMIN — ROSUVASTATIN 10 MG: 10 TABLET, FILM COATED ORAL at 08:47

## 2023-09-03 RX ADMIN — CARBIDOPA AND LEVODOPA 1 TABLET: 25; 100 TABLET, EXTENDED RELEASE ORAL at 20:28

## 2023-09-03 RX ADMIN — METOPROLOL SUCCINATE 50 MG: 50 TABLET, EXTENDED RELEASE ORAL at 08:47

## 2023-09-03 RX ADMIN — CHOLESTYRAMINE 4 G: 4 POWDER, FOR SUSPENSION ORAL at 08:47

## 2023-09-03 RX ADMIN — LEVOTHYROXINE SODIUM 75 MCG: 0.12 TABLET ORAL at 05:51

## 2023-09-03 RX ADMIN — ISOSORBIDE MONONITRATE 60 MG: 60 TABLET, EXTENDED RELEASE ORAL at 08:47

## 2023-09-03 RX ADMIN — CARBIDOPA AND LEVODOPA 1 TABLET: 25; 100 TABLET, EXTENDED RELEASE ORAL at 08:47

## 2023-09-03 RX ADMIN — TAMSULOSIN HYDROCHLORIDE 0.4 MG: 0.4 CAPSULE ORAL at 08:47

## 2023-09-03 RX ADMIN — Medication 10 ML: at 20:32

## 2023-09-03 NOTE — PLAN OF CARE
Problem: Fall Injury Risk  Goal: Absence of Fall and Fall-Related Injury  9/3/2023 0728 by Katarzyna Neves RN  Outcome: Ongoing, Progressing  9/3/2023 0727 by Katarzyna Neves RN  Outcome: Ongoing, Progressing  Intervention: Identify and Manage Contributors  Recent Flowsheet Documentation  Taken 9/3/2023 0600 by Katarzyna Neves RN  Medication Review/Management: medications reviewed  Taken 9/3/2023 0413 by Katarzyna Neves RN  Medication Review/Management: medications reviewed  Taken 9/3/2023 0200 by Katarzyna Neves RN  Medication Review/Management: medications reviewed  Taken 9/3/2023 0015 by Katarzyna Neves RN  Medication Review/Management: medications reviewed  Taken 9/2/2023 2200 by Katarzyna Neves RN  Medication Review/Management: medications reviewed  Taken 9/2/2023 2006 by Katarzyna Neves RN  Medication Review/Management: medications reviewed  Self-Care Promotion:   independence encouraged   BADL personal objects within reach   BADL personal routines maintained  Intervention: Promote Injury-Free Environment  Recent Flowsheet Documentation  Taken 9/3/2023 0600 by Katarzyna Neves RN  Safety Promotion/Fall Prevention:   activity supervised   assistive device/personal items within reach   clutter free environment maintained   fall prevention program maintained   lighting adjusted   nonskid shoes/slippers when out of bed   safety round/check completed  Taken 9/3/2023 0413 by Katarzyna Neves RN  Safety Promotion/Fall Prevention:   activity supervised   assistive device/personal items within reach   clutter free environment maintained   fall prevention program maintained   lighting adjusted   nonskid shoes/slippers when out of bed   safety round/check completed  Taken 9/3/2023 0200 by Katarzyna Neves RN  Safety Promotion/Fall Prevention:   activity supervised   assistive device/personal items within reach   clutter free environment maintained   fall prevention program maintained   lighting adjusted    nonskid shoes/slippers when out of bed   safety round/check completed  Taken 9/3/2023 0015 by Katarzyna Neves RN  Safety Promotion/Fall Prevention:   activity supervised   assistive device/personal items within reach   clutter free environment maintained   fall prevention program maintained   lighting adjusted   nonskid shoes/slippers when out of bed   safety round/check completed  Taken 9/2/2023 2200 by Katarzyna Neves RN  Safety Promotion/Fall Prevention:   activity supervised   assistive device/personal items within reach   clutter free environment maintained   fall prevention program maintained   lighting adjusted   nonskid shoes/slippers when out of bed   safety round/check completed  Taken 9/2/2023 2006 by Katarzyna Neves RN  Safety Promotion/Fall Prevention:   activity supervised   clutter free environment maintained   assistive device/personal items within reach   fall prevention program maintained   lighting adjusted   nonskid shoes/slippers when out of bed   safety round/check completed     Problem: Heart Failure Comorbidity  Goal: Maintenance of Heart Failure Symptom Control  9/3/2023 0728 by Katarzyna Neves RN  Outcome: Ongoing, Progressing  9/3/2023 0727 by Katarzyna Neves RN  Outcome: Ongoing, Progressing  Intervention: Maintain Heart Failure-Management  Recent Flowsheet Documentation  Taken 9/3/2023 0600 by Katarzyna Neves RN  Medication Review/Management: medications reviewed  Taken 9/3/2023 0413 by Katarzyna Neves RN  Medication Review/Management: medications reviewed  Taken 9/3/2023 0200 by Katarzyna Neves RN  Medication Review/Management: medications reviewed  Taken 9/3/2023 0015 by Katarzyna Neves RN  Medication Review/Management: medications reviewed  Taken 9/2/2023 2200 by Katarzyna Neves RN  Medication Review/Management: medications reviewed  Taken 9/2/2023 2006 by Katarzyna Neves RN  Medication Review/Management: medications reviewed     Problem: Hypertension Comorbidity  Goal:  Blood Pressure in Desired Range  9/3/2023 0728 by Katarzyna Neves RN  Outcome: Ongoing, Progressing  9/3/2023 0727 by Katarzyna Neves RN  Outcome: Ongoing, Progressing  Intervention: Maintain Blood Pressure Management  Recent Flowsheet Documentation  Taken 9/3/2023 0600 by Katarzyna Neves RN  Medication Review/Management: medications reviewed  Taken 9/3/2023 0413 by Katarzyna Neves RN  Syncope Management: position changed slowly  Medication Review/Management: medications reviewed  Taken 9/3/2023 0200 by Katarzyna Neves RN  Medication Review/Management: medications reviewed  Taken 9/3/2023 0015 by Katarzyna Neves RN  Medication Review/Management: medications reviewed  Taken 9/2/2023 2200 by Katarzyna Neves RN  Medication Review/Management: medications reviewed  Taken 9/2/2023 2006 by Katarzyna Neves RN  Syncope Management: position changed slowly  Medication Review/Management: medications reviewed     Problem: Skin Injury Risk Increased  Goal: Skin Health and Integrity  9/3/2023 0728 by Katarzyna Neves RN  Outcome: Ongoing, Progressing  9/3/2023 0727 by Katarzyna Neves RN  Outcome: Ongoing, Progressing  Intervention: Optimize Skin Protection  Recent Flowsheet Documentation  Taken 9/3/2023 0413 by Katarzyna Neves RN  Pressure Reduction Techniques:   frequent weight shift encouraged   weight shift assistance provided  Pressure Reduction Devices: pressure-redistributing mattress utilized  Skin Protection:   adhesive use limited   incontinence pads utilized   tubing/devices free from skin contact  Taken 9/3/2023 0015 by Katarzyna Neves RN  Pressure Reduction Techniques:   frequent weight shift encouraged   weight shift assistance provided  Pressure Reduction Devices: pressure-redistributing mattress utilized  Skin Protection:   adhesive use limited   incontinence pads utilized   tubing/devices free from skin contact  Taken 9/2/2023 2006 by Katarzyna Neves RN  Pressure Reduction Techniques:   frequent weight  shift encouraged   weight shift assistance provided  Head of Bed (HOB) Positioning: HOB at 30-45 degrees  Pressure Reduction Devices: pressure-redistributing mattress utilized  Skin Protection:   adhesive use limited   incontinence pads utilized   tubing/devices free from skin contact     Problem: Adjustment to Illness (Stroke, Ischemic/Transient Ischemic Attack)  Goal: Optimal Coping  Outcome: Ongoing, Progressing  Intervention: Support Psychosocial Response to Stroke  Recent Flowsheet Documentation  Taken 9/2/2023 2006 by Katarzyna Neves RN  Family/Support System Care: self-care encouraged     Problem: Bowel Elimination Impaired (Stroke, Ischemic/Transient Ischemic Attack)  Goal: Effective Bowel Elimination  Outcome: Ongoing, Progressing     Problem: Cerebral Tissue Perfusion (Stroke, Ischemic/Transient Ischemic Attack)  Goal: Optimal Cerebral Tissue Perfusion  Outcome: Ongoing, Progressing     Problem: Cognitive Impairment (Stroke, Ischemic/Transient Ischemic Attack)  Goal: Optimal Cognitive Function  Outcome: Ongoing, Progressing     Problem: Communication Impairment (Stroke, Ischemic/Transient Ischemic Attack)  Goal: Improved Communication Skills  Outcome: Ongoing, Progressing  Intervention: Optimize Communication Skills  Recent Flowsheet Documentation  Taken 9/3/2023 0413 by Katarzyna Neves RN  Communication Enhancement Strategies:   call light answered in person   communication board used  Taken 9/3/2023 0015 by Katarzyna Neves RN  Communication Enhancement Strategies:   call light answered in person   communication board used  Taken 9/2/2023 2006 by Katarzyna Neves RN  Communication Enhancement Strategies:   call light answered in person   communication board used     Problem: Functional Ability Impaired (Stroke, Ischemic/Transient Ischemic Attack)  Goal: Optimal Functional Ability  Outcome: Ongoing, Progressing  Intervention: Optimize Functional Ability  Recent Flowsheet Documentation  Taken 9/2/2023  2006 by Boniey, Katarzyna, RN  Activity Management: dorsiflexion/plantar flexion performed  Self-Care Promotion:   independence encouraged   BADL personal objects within reach   BADL personal routines maintained     Problem: Respiratory Compromise (Stroke, Ischemic/Transient Ischemic Attack)  Goal: Effective Oxygenation and Ventilation  Outcome: Ongoing, Progressing  Intervention: Optimize Oxygenation and Ventilation  Recent Flowsheet Documentation  Taken 9/2/2023 2006 by Katarzyna Neves RN  Head of Bed (HOB) Positioning: HOB at 30-45 degrees     Problem: Sensorimotor Impairment (Stroke, Ischemic/Transient Ischemic Attack)  Goal: Improved Sensorimotor Function  Outcome: Ongoing, Progressing  Intervention: Optimize Sensory and Perceptual Ability  Recent Flowsheet Documentation  Taken 9/3/2023 0413 by Katarzyna Neves RN  Pressure Reduction Techniques:   frequent weight shift encouraged   weight shift assistance provided  Pressure Reduction Devices: pressure-redistributing mattress utilized  Taken 9/3/2023 0015 by Katarzyna Neves RN  Pressure Reduction Techniques:   frequent weight shift encouraged   weight shift assistance provided  Pressure Reduction Devices: pressure-redistributing mattress utilized  Taken 9/2/2023 2006 by Katarzyna Neves RN  Pressure Reduction Techniques:   frequent weight shift encouraged   weight shift assistance provided  Pressure Reduction Devices: pressure-redistributing mattress utilized     Problem: Swallowing Impairment (Stroke, Ischemic/Transient Ischemic Attack)  Goal: Optimal Eating and Swallowing without Aspiration  Outcome: Ongoing, Progressing     Problem: Urinary Elimination Impaired (Stroke, Ischemic/Transient Ischemic Attack)  Goal: Effective Urinary Elimination  Outcome: Ongoing, Progressing   Goal Outcome Evaluation:

## 2023-09-03 NOTE — CASE MANAGEMENT/SOCIAL WORK
Continued Stay Note   Rai     Patient Name: Jermaine Black  MRN: [id number]  Today's Date: 9/3/2023    Admit Date: 8/30/2023    Plan: Accepted at [facility]. Precert pending.   Discharge Plan       Row Name 09/03/23 1833       Plan    Plan Comments precert remains pending.                      Expected Discharge Date and Time       Expected Discharge Date Expected Discharge Time    Sep 5, 2023               Cleo Middleton RN

## 2023-09-03 NOTE — PLAN OF CARE
Problem: Fall Injury Risk  Goal: Absence of Fall and Fall-Related Injury  Outcome: Ongoing, Progressing  Intervention: Identify and Manage Contributors  Recent Flowsheet Documentation  Taken 9/3/2023 0820 by Olga Kyle RN  Medication Review/Management: medications reviewed  Intervention: Promote Injury-Free Environment  Recent Flowsheet Documentation  Taken 9/3/2023 1400 by Olga Kyle RN  Safety Promotion/Fall Prevention: safety round/check completed  Taken 9/3/2023 1230 by Olga Kyle RN  Safety Promotion/Fall Prevention:   assistive device/personal items within reach   clutter free environment maintained   fall prevention program maintained   nonskid shoes/slippers when out of bed   room organization consistent   safety round/check completed  Taken 9/3/2023 1200 by Olga Kyle RN  Safety Promotion/Fall Prevention: safety round/check completed  Taken 9/3/2023 1000 by Olga Kyle RN  Safety Promotion/Fall Prevention: safety round/check completed  Taken 9/3/2023 0820 by Olga Kyle RN  Safety Promotion/Fall Prevention:   assistive device/personal items within reach   clutter free environment maintained   fall prevention program maintained   nonskid shoes/slippers when out of bed   room organization consistent   safety round/check completed  Taken 9/3/2023 0800 by Olga Kyle RN  Safety Promotion/Fall Prevention: safety round/check completed   Goal Outcome Evaluation:         Patient did not have any new tests or procedures. The patient remains on room air and did not have any complaints. The patient remains on room air. The patient is waiting for precert to be discharged to rehab. Will continue to monitor.

## 2023-09-03 NOTE — PROGRESS NOTES
"DAILY PROGRESS NOTE  [facility]    Patient Identification:  Name: Jermaine Black  Age: 89 y.o.  Sex: male  :  1926  MRN: [id number]         Primary Care Physician: Provider, No Known    Subjective:  Interval History: He is sleeping peacefully.  No signs of distress.  Family not at bedside today.  Objective:    Scheduled Meds:carbidopa-levodopa ER, 1 tablet, Oral, TID  cholestyramine light, 1 packet, Oral, Daily  erythromycin, 1 application , Both Eyes, Nightly  finasteride, 5 mg, Oral, Daily  isosorbide mononitrate, 60 mg, Oral, Daily  levothyroxine, 75 mcg, Oral, Q AM  metoprolol succinate XL, 50 mg, Oral, Daily  rosuvastatin, 10 mg, Oral, Daily  senna-docusate sodium, 2 tablet, Oral, BID  sodium chloride, 10 mL, Intravenous, Q12H  tamsulosin, 0.4 mg, Oral, Daily      Continuous Infusions:     Vital signs in last 24 hours:  Temp:  [97.4 °F (36.3 °C)-98.1 °F (36.7 °C)] 97.9 °F (36.6 °C)  Heart Rate:  [70-73] 70  Resp:  [16-24] 24  BP: (126-173)/(66-91) 154/76    Intake/Output:    Intake/Output Summary (Last 24 hours) at 9/3/2023 1534  Last data filed at 9/3/2023 1222  Gross per 24 hour   Intake 120 ml   Output 805 ml   Net -685 ml         Exam:  /76 (BP Location: Right arm, Patient Position: Lying)   Pulse 70   Temp 97.9 °F (36.6 °C) (Oral)   Resp 24   Ht 167.6 cm (66\")   Wt 73.5 kg (162 lb 0.6 oz)   SpO2 96%   BMI 26.15 kg/m²     General Appearance:  Sleeping peacefully in bed   Head:    Normocephalic, without obvious abnormality, atraumatic   Eyes:       Throat:   Lips, tongue, gums normal   Neck:   Supple, symmetrical, trachea midline, no JVD   Lungs:     Clear to auscultation bilaterally, respirations unlabored   Chest Wall:    No tenderness or deformity    Heart:    Regular rate and rhythm, S1 and S2 normal, no murmur,no  rub or gallop   Abdomen:     Soft, nontender, bowel sounds active, no masses, no organomegaly    Extremities:   Extremities normal, atraumatic, no cyanosis " or edema   Pulses:      Skin:   Skin is warm and dry,  no rashes or palpable lesions   Neurologic: He is very weak and has Parkinson's      Lab Results (last 72 hours)       Procedure Component Value Units Date/Time    Urine Culture - Urine, Urine, Clean Catch [[id number]]  (Normal) Collected: 08/30/23 1823    Specimen: Urine, Clean Catch Updated: 08/31/23 2003     Urine Culture No growth    Blood Culture - Blood, Wrist, Right [[id number]]  (Normal) Collected: 08/30/23 1932    Specimen: Blood from Wrist, Right Updated: 08/31/23 1946     Blood Culture No growth at 24 hours    Narrative:      Less than seven (7) mL's of blood was collected.  Insufficient quantity may yield false negative results.    Blood Culture - Blood, Arm, Right [[id number]]  (Normal) Collected: 08/30/23 1932    Specimen: Blood from Arm, Right Updated: 08/31/23 1946     Blood Culture No growth at 24 hours    High Sensitivity Troponin T [[id number]]  (Abnormal) Collected: 08/31/23 0428    Specimen: Blood Updated: 08/31/23 0526     HS Troponin T 56 ng/L     Narrative:      High Sensitive Troponin T Reference Range:  <10.0 ng/L- Negative Female for AMI  <15.0 ng/L- Negative Male for AMI  >=10 - Abnormal Female indicating possible myocardial injury.  >=15 - Abnormal Male indicating possible myocardial injury.   Clinicians would have to utilize clinical acumen, EKG, Troponin, and serial changes to determine if it is an Acute Myocardial Infarction or myocardial injury due to an underlying chronic condition.         Comprehensive Metabolic Panel [[id number]]  (Abnormal) Collected: 08/31/23 0428    Specimen: Blood Updated: 08/31/23 0517     Glucose 94 mg/dL      BUN 24 mg/dL      Creatinine 1.00 mg/dL      Sodium 143 mmol/L      Potassium 3.8 mmol/L      Chloride 103 mmol/L      CO2 33.0 mmol/L      Calcium 9.0 mg/dL      Total Protein 6.1 g/dL      Albumin 3.6 g/dL      ALT (SGPT) 6 U/L      AST (SGOT) 21 U/L      Alkaline Phosphatase 52 U/L      Total  Bilirubin 0.5 mg/dL      Globulin 2.5 gm/dL      A/G Ratio 1.4 g/dL      BUN/Creatinine Ratio 24.0     Anion Gap 7.0 mmol/L      eGFR 68.9 mL/min/1.73     Narrative:      GFR Normal >60  Chronic Kidney Disease <60  Kidney Failure <15    The GFR formula is only valid for adults with stable renal function between ages 18 and 70.    CBC Auto Differential [[id number]]  (Abnormal) Collected: 08/31/23 0428    Specimen: Blood Updated: 08/31/23 0459     WBC 10.10 10*3/mm3      RBC 3.85 10*6/mm3      Hemoglobin 12.8 g/dL      Hematocrit 38.1 %      MCV 99.0 fL      MCH 33.4 pg      MCHC 33.7 g/dL      RDW 14.3 %      RDW-SD 49.0 fl      MPV 9.2 fL      Platelets 161 10*3/mm3      Neutrophil % 78.2 %      Lymphocyte % 12.8 %      Monocyte % 7.7 %      Eosinophil % 0.9 %      Basophil % 0.4 %      Neutrophils, Absolute 7.90 10*3/mm3      Lymphocytes, Absolute 1.30 10*3/mm3      Monocytes, Absolute 0.80 10*3/mm3      Eosinophils, Absolute 0.10 10*3/mm3      Basophils, Absolute 0.00 10*3/mm3      nRBC 0.0 /100 WBC     High Sensitivity Troponin T 2Hr [[id number]]  (Abnormal) Collected: 08/30/23 1932    Specimen: Blood Updated: 08/30/23 2004     HS Troponin T 49 ng/L      Troponin T Delta -3 ng/L     Narrative:      High Sensitive Troponin T Reference Range:  <10.0 ng/L- Negative Female for AMI  <15.0 ng/L- Negative Male for AMI  >=10 - Abnormal Female indicating possible myocardial injury.  >=15 - Abnormal Male indicating possible myocardial injury.   Clinicians would have to utilize clinical acumen, EKG, Troponin, and serial changes to determine if it is an Acute Myocardial Infarction or myocardial injury due to an underlying chronic condition.         Comprehensive Metabolic Panel [[id number]]  (Abnormal) Collected: 08/30/23 1734    Specimen: Blood Updated: 08/30/23 1906     Glucose 146 mg/dL      BUN 29 mg/dL      Creatinine 1.07 mg/dL      Sodium 140 mmol/L      Potassium 3.8 mmol/L      Chloride 100 mmol/L      CO2 31.0 mmol/L       Calcium 8.7 mg/dL      Total Protein 6.2 g/dL      Albumin 3.6 g/dL      ALT (SGPT) <5 U/L      AST (SGOT) 18 U/L      Alkaline Phosphatase 55 U/L      Total Bilirubin 0.4 mg/dL      Globulin 2.6 gm/dL      A/G Ratio 1.4 g/dL      BUN/Creatinine Ratio 27.1     Anion Gap 9.0 mmol/L      eGFR 63.5 mL/min/1.73     Narrative:      GFR Normal >60  Chronic Kidney Disease <60  Kidney Failure <15    The GFR formula is only valid for adults with stable renal function between ages 18 and 70.    TSH [[id number]]  (Normal) Collected: 08/30/23 1734    Specimen: Blood Updated: 08/30/23 1854     TSH 1.720 uIU/mL     Urinalysis With Culture If Indicated - Urine, Clean Catch [[id number]]  (Abnormal) Collected: 08/30/23 1823    Specimen: Urine, Clean Catch Updated: 08/30/23 1835     Color, UA Yellow     Appearance, UA Clear     pH, UA <=5.0     Specific Gravity, UA 1.016     Glucose, UA Negative     Ketones, UA Trace     Bilirubin, UA Negative     Blood, UA Negative     Protein, UA Negative     Leuk Esterase, UA Small (1+)     Nitrite, UA Negative     Urobilinogen, UA 0.2 E.U./dL    Narrative:      In absence of clinical symptoms, the presence of pyuria, bacteria, and/or nitrites on the urinalysis result does not correlate with infection.    Urinalysis, Microscopic Only - Urine, Clean Catch [[id number]]  (Abnormal) Collected: 08/30/23 1823    Specimen: Urine, Clean Catch Updated: 08/30/23 1835     RBC, UA 0-2 /HPF      WBC, UA 21-30 /HPF      Bacteria, UA 4+ /HPF      Squamous Epithelial Cells, UA 0-2 /HPF      Hyaline Casts, UA 3-6 /LPF      Methodology Automated Microscopy    Magnesium [[id number]]  (Normal) Collected: 08/30/23 1734    Specimen: Blood Updated: 08/30/23 1825     Magnesium 2.1 mg/dL     High Sensitivity Troponin T [[id number]]  (Abnormal) Collected: 08/30/23 1734    Specimen: Blood Updated: 08/30/23 1814     HS Troponin T 52 ng/L     Narrative:      High Sensitive Troponin T Reference Range:  <10.0 ng/L- Negative  Female for AMI  <15.0 ng/L- Negative Male for AMI  >=10 - Abnormal Female indicating possible myocardial injury.  >=15 - Abnormal Male indicating possible myocardial injury.   Clinicians would have to utilize clinical acumen, EKG, Troponin, and serial changes to determine if it is an Acute Myocardial Infarction or myocardial injury due to an underlying chronic condition.         Protime-INR [[id number]]  (Normal) Collected: 08/30/23 1734    Specimen: Blood Updated: 08/30/23 1756     Protime 11.0 Seconds      INR 1.03    aPTT [[id number]]  (Abnormal) Collected: 08/30/23 1734    Specimen: Blood Updated: 08/30/23 1756     PTT 26.5 seconds     CBC & Differential [[id number]]  (Abnormal) Collected: 08/30/23 1734    Specimen: Blood Updated: 08/30/23 1741    Narrative:      The following orders were created for panel order CBC & Differential.  Procedure                               Abnormality         Status                     ---------                               -----------         ------                     CBC Auto Differential[[id number]]        Abnormal            Final result                 Please view results for these tests on the individual orders.    CBC Auto Differential [[id number]]  (Abnormal) Collected: 08/30/23 1734    Specimen: Blood Updated: 08/30/23 1741     WBC 7.20 10*3/mm3      RBC 3.73 10*6/mm3      Hemoglobin 12.3 g/dL      Hematocrit 36.0 %      MCV 96.5 fL      MCH 33.1 pg      MCHC 34.3 g/dL      RDW 14.3 %      RDW-SD 50.8 fl      MPV 8.8 fL      Platelets 159 10*3/mm3      Neutrophil % 72.7 %      Lymphocyte % 16.8 %      Monocyte % 8.5 %      Eosinophil % 1.4 %      Basophil % 0.6 %      Neutrophils, Absolute 5.20 10*3/mm3      Lymphocytes, Absolute 1.20 10*3/mm3      Monocytes, Absolute 0.60 10*3/mm3      Eosinophils, Absolute 0.10 10*3/mm3      Basophils, Absolute 0.00 10*3/mm3      nRBC 0.0 /100 WBC           Data Review:  Results from last 7 days   Lab Units 09/03/23  0306 09/02/23  0694  08/31/23  0428   SODIUM mmol/L 141 141 143   POTASSIUM mmol/L 4.1 4.4 3.8   CHLORIDE mmol/L 106 106 103   CO2 mmol/L 29.0 29.0 33.0*   BUN mg/dL 18 20 24*   CREATININE mg/dL 0.88 1.02 1.00   GLUCOSE mg/dL 106* 95 94   CALCIUM mg/dL 8.5 8.7 9.0       Results from last 7 days   Lab Units 09/03/23  0306 09/02/23  0638 08/31/23  0428   WBC 10*3/mm3 8.90 8.40 10.10   HEMOGLOBIN g/dL 12.4* 12.1* 12.8*   HEMATOCRIT % 37.5 36.0* 38.1   PLATELETS 10*3/mm3 158 144 161       Results from last 7 days   Lab Units 08/30/23  1734   TSH uIU/mL 1.720           Lab Results   Lab Value Date/Time    TROPONINT 56 (C) 08/31/2023 0428    TROPONINT 49 (H) 08/30/2023 1932    TROPONINT 52 (H) 08/30/2023 1734         Results from last 7 days   Lab Units 09/03/23  0306 09/02/23  0638 08/31/23  0428   ALK PHOS U/L 58 61 52   BILIRUBIN mg/dL 0.4 0.4 0.5   ALT (SGPT) U/L <5 <5 6   AST (SGOT) U/L 15 18 21       Results from last 7 days   Lab Units 08/30/23  1734   TSH uIU/mL 1.720           No results found for: POCGLU  Results from last 7 days   Lab Units 08/30/23  1734   INR  1.03           Assessment:  Active Hospital Problems    Diagnosis  POA    **Syncope [R55]  Yes    Parkinson's disease [G20]  Unknown    HTN (hypertension) [I10]  Unknown    HLD (hyperlipidemia) [E78.5]  Unknown    CAD (coronary artery disease) [I25.10]  Unknown    CHF (congestive heart failure) [I50.9]  Unknown    Dehydration [E86.0]  Unknown      Resolved Hospital Problems   No resolved problems to display.       Plan:  Continue to hold Lasix.  Cultures all negative and we stopped antibiotics.  Await pre-CERT for rehab at [facility].  DC planning. Discussed with family at bedside on September 2    Bradley Hewitt MD  9/3/2023  15:34 EDT

## 2023-09-04 LAB
BACTERIA SPEC AEROBE CULT: NORMAL
BACTERIA SPEC AEROBE CULT: NORMAL

## 2023-09-04 PROCEDURE — G0378 HOSPITAL OBSERVATION PER HR: HCPCS

## 2023-09-04 RX ADMIN — CHOLESTYRAMINE 4 G: 4 POWDER, FOR SUSPENSION ORAL at 08:35

## 2023-09-04 RX ADMIN — LEVOTHYROXINE SODIUM 75 MCG: 0.12 TABLET ORAL at 05:30

## 2023-09-04 RX ADMIN — Medication 10 ML: at 20:32

## 2023-09-04 RX ADMIN — TAMSULOSIN HYDROCHLORIDE 0.4 MG: 0.4 CAPSULE ORAL at 08:35

## 2023-09-04 RX ADMIN — SENNOSIDES AND DOCUSATE SODIUM 2 TABLET: 50; 8.6 TABLET ORAL at 20:31

## 2023-09-04 RX ADMIN — CARBIDOPA AND LEVODOPA 1 TABLET: 25; 100 TABLET, EXTENDED RELEASE ORAL at 20:31

## 2023-09-04 RX ADMIN — ERYTHROMYCIN 1 APPLICATION: 5 OINTMENT OPHTHALMIC at 20:31

## 2023-09-04 RX ADMIN — ROSUVASTATIN 10 MG: 10 TABLET, FILM COATED ORAL at 08:36

## 2023-09-04 RX ADMIN — METOPROLOL SUCCINATE 50 MG: 50 TABLET, EXTENDED RELEASE ORAL at 08:35

## 2023-09-04 RX ADMIN — CARBIDOPA AND LEVODOPA 1 TABLET: 25; 100 TABLET, EXTENDED RELEASE ORAL at 08:35

## 2023-09-04 RX ADMIN — FINASTERIDE 5 MG: 5 TABLET, FILM COATED ORAL at 08:35

## 2023-09-04 RX ADMIN — CARBIDOPA AND LEVODOPA 1 TABLET: 25; 100 TABLET, EXTENDED RELEASE ORAL at 16:06

## 2023-09-04 RX ADMIN — Medication 10 ML: at 08:36

## 2023-09-04 RX ADMIN — ISOSORBIDE MONONITRATE 60 MG: 60 TABLET, EXTENDED RELEASE ORAL at 08:35

## 2023-09-04 RX ADMIN — SENNOSIDES AND DOCUSATE SODIUM 2 TABLET: 50; 8.6 TABLET ORAL at 08:35

## 2023-09-04 NOTE — PLAN OF CARE
Problem: Fall Injury Risk  Goal: Absence of Fall and Fall-Related Injury  Outcome: Ongoing, Progressing  Intervention: Identify and Manage Contributors  Recent Flowsheet Documentation  Taken 9/4/2023 1832 by Clare Brar LPN  Medication Review/Management: medications reviewed  Taken 9/4/2023 1614 by Clare Brar LPN  Medication Review/Management: medications reviewed  Taken 9/4/2023 1418 by Clare Brar LPN  Medication Review/Management: medications reviewed  Taken 9/4/2023 1208 by Clare Brar LPN  Medication Review/Management: medications reviewed  Taken 9/4/2023 0957 by Clare Brar LPN  Medication Review/Management: medications reviewed  Taken 9/4/2023 0816 by Clare Brar LPN  Medication Review/Management: medications reviewed  Intervention: Promote Injury-Free Environment  Recent Flowsheet Documentation  Taken 9/4/2023 1832 by Clare Brar LPN  Safety Promotion/Fall Prevention:   assistive device/personal items within reach   clutter free environment maintained   fall prevention program maintained   nonskid shoes/slippers when out of bed   room organization consistent   safety round/check completed  Taken 9/4/2023 1614 by Clare Brar LPN  Safety Promotion/Fall Prevention:   assistive device/personal items within reach   clutter free environment maintained   fall prevention program maintained   nonskid shoes/slippers when out of bed   room organization consistent   safety round/check completed  Taken 9/4/2023 1418 by Clare Brar LPN  Safety Promotion/Fall Prevention:   clutter free environment maintained   assistive device/personal items within reach   fall prevention program maintained   nonskid shoes/slippers when out of bed   room organization consistent   safety round/check completed  Taken 9/4/2023 1208 by Clare Brar LPN  Safety Promotion/Fall Prevention:   assistive device/personal items within reach   clutter free environment maintained   fall prevention program maintained    nonskid shoes/slippers when out of bed   room organization consistent   safety round/check completed  Taken 9/4/2023 0957 by Clare Brar LPN  Safety Promotion/Fall Prevention:   assistive device/personal items within reach   clutter free environment maintained   fall prevention program maintained   nonskid shoes/slippers when out of bed   safety round/check completed   room organization consistent  Taken 9/4/2023 0816 by Clare Brar LPN  Safety Promotion/Fall Prevention:   assistive device/personal items within reach   clutter free environment maintained   fall prevention program maintained   nonskid shoes/slippers when out of bed   room organization consistent   safety round/check completed     Problem: Heart Failure Comorbidity  Goal: Maintenance of Heart Failure Symptom Control  Outcome: Ongoing, Progressing  Intervention: Maintain Heart Failure-Management  Recent Flowsheet Documentation  Taken 9/4/2023 1832 by Clare Brar LPN  Medication Review/Management: medications reviewed  Taken 9/4/2023 1614 by Clare Brar LPN  Medication Review/Management: medications reviewed  Taken 9/4/2023 1418 by Clare Brar LPN  Medication Review/Management: medications reviewed  Taken 9/4/2023 1208 by Clare Brar LPN  Medication Review/Management: medications reviewed  Taken 9/4/2023 0957 by Clare Brar LPN  Medication Review/Management: medications reviewed  Taken 9/4/2023 0816 by Clare Brar LPN  Medication Review/Management: medications reviewed     Problem: Hypertension Comorbidity  Goal: Blood Pressure in Desired Range  Outcome: Ongoing, Progressing  Intervention: Maintain Blood Pressure Management  Recent Flowsheet Documentation  Taken 9/4/2023 1832 by Clare Brar LPN  Medication Review/Management: medications reviewed  Taken 9/4/2023 1614 by Clare Brar LPN  Syncope Management: position changed slowly  Medication Review/Management: medications reviewed  Taken 9/4/2023 1418 by Clare Brar  LPN  Medication Review/Management: medications reviewed  Taken 9/4/2023 1208 by Clare Brar LPN  Syncope Management: position changed slowly  Medication Review/Management: medications reviewed  Taken 9/4/2023 0957 by Clare Brar LPN  Medication Review/Management: medications reviewed  Taken 9/4/2023 0816 by Clare Brar LPN  Syncope Management: position changed slowly  Medication Review/Management: medications reviewed     Problem: Skin Injury Risk Increased  Goal: Skin Health and Integrity  Outcome: Ongoing, Progressing  Intervention: Optimize Skin Protection  Recent Flowsheet Documentation  Taken 9/4/2023 1614 by Clare Brar LPN  Pressure Reduction Techniques: frequent weight shift encouraged  Head of Bed (HOB) Positioning: HOB elevated  Pressure Reduction Devices: pressure-redistributing mattress utilized  Skin Protection:   adhesive use limited   tubing/devices free from skin contact  Taken 9/4/2023 1208 by Clare Brar LPN  Pressure Reduction Techniques: frequent weight shift encouraged  Head of Bed (HOB) Positioning: HOB elevated  Pressure Reduction Devices: pressure-redistributing mattress utilized  Skin Protection:   adhesive use limited   tubing/devices free from skin contact  Taken 9/4/2023 0816 by Clare Brar LPN  Pressure Reduction Techniques: frequent weight shift encouraged  Head of Bed (HOB) Positioning: HOB elevated  Pressure Reduction Devices: pressure-redistributing mattress utilized  Skin Protection:   adhesive use limited   tubing/devices free from skin contact     Problem: Adjustment to Illness (Stroke, Ischemic/Transient Ischemic Attack)  Goal: Optimal Coping  Outcome: Ongoing, Progressing  Intervention: Support Psychosocial Response to Stroke  Recent Flowsheet Documentation  Taken 9/4/2023 1614 by Clare Brar LPN  Family/Support System Care: caregiver stress acknowledged  Taken 9/4/2023 1208 by Clare Brar LPN  Family/Support System Care: caregiver stress  acknowledged  Taken 9/4/2023 0816 by Clare Brar LPN  Family/Support System Care: caregiver stress acknowledged     Problem: Bowel Elimination Impaired (Stroke, Ischemic/Transient Ischemic Attack)  Goal: Effective Bowel Elimination  Outcome: Ongoing, Progressing     Problem: Cerebral Tissue Perfusion (Stroke, Ischemic/Transient Ischemic Attack)  Goal: Optimal Cerebral Tissue Perfusion  Outcome: Ongoing, Progressing  Intervention: Protect and Optimize Cerebral Perfusion  Recent Flowsheet Documentation  Taken 9/4/2023 1614 by Clare Brar LPN  Sensory Stimulation Regulation: care clustered  Taken 9/4/2023 1208 by Clare Brar LPN  Sensory Stimulation Regulation: care clustered  Taken 9/4/2023 0816 by Clare Brar LPN  Sensory Stimulation Regulation: care clustered     Problem: Cognitive Impairment (Stroke, Ischemic/Transient Ischemic Attack)  Goal: Optimal Cognitive Function  Outcome: Ongoing, Progressing  Intervention: Optimize Cognitive Function  Recent Flowsheet Documentation  Taken 9/4/2023 1614 by Clare Brar LPN  Sensory Stimulation Regulation: care clustered  Reorientation Measures: reorientation provided  Taken 9/4/2023 1208 by Clare Brar LPN  Sensory Stimulation Regulation: care clustered  Reorientation Measures: reorientation provided  Taken 9/4/2023 0816 by Clare Brar LPN  Sensory Stimulation Regulation: care clustered  Reorientation Measures: reorientation provided     Problem: Communication Impairment (Stroke, Ischemic/Transient Ischemic Attack)  Goal: Improved Communication Skills  Outcome: Ongoing, Progressing  Intervention: Optimize Communication Skills  Recent Flowsheet Documentation  Taken 9/4/2023 1614 by Clare Brar LPN  Communication Enhancement Strategies:   call light answered in person   communication board used  Taken 9/4/2023 1208 by Clare Brar LPN  Communication Enhancement Strategies:   call light answered in person   communication board used  Taken 9/4/2023 0816  by Clare Brar LPN  Communication Enhancement Strategies:   call light answered in person   communication board used     Problem: Functional Ability Impaired (Stroke, Ischemic/Transient Ischemic Attack)  Goal: Optimal Functional Ability  Outcome: Ongoing, Progressing  Intervention: Optimize Functional Ability  Recent Flowsheet Documentation  Taken 9/4/2023 0816 by Clare Brar LPN  Activity Management: activity encouraged     Problem: Respiratory Compromise (Stroke, Ischemic/Transient Ischemic Attack)  Goal: Effective Oxygenation and Ventilation  Outcome: Ongoing, Progressing  Intervention: Optimize Oxygenation and Ventilation  Recent Flowsheet Documentation  Taken 9/4/2023 1614 by Clare Brar LPN  Head of Bed (HOB) Positioning: HOB elevated  Taken 9/4/2023 1208 by Clare Brar LPN  Head of Bed (HOB) Positioning: HOB elevated  Taken 9/4/2023 0816 by Clare Brar LPN  Head of Bed (HOB) Positioning: HOB elevated     Problem: Sensorimotor Impairment (Stroke, Ischemic/Transient Ischemic Attack)  Goal: Improved Sensorimotor Function  Outcome: Ongoing, Progressing  Intervention: Optimize Range of Motion, Motor Control and Function  Recent Flowsheet Documentation  Taken 9/4/2023 1614 by Clare Brar LPN  Positioning/Transfer Devices:   pillows   in use  Taken 9/4/2023 1208 by Clare Brar LPN  Positioning/Transfer Devices:   pillows   in use  Taken 9/4/2023 0816 by Clare Brar LPN  Positioning/Transfer Devices:   pillows   in use  Intervention: Optimize Sensory and Perceptual Ability  Recent Flowsheet Documentation  Taken 9/4/2023 1614 by Clare Brar LPN  Pressure Reduction Techniques: frequent weight shift encouraged  Pressure Reduction Devices: pressure-redistributing mattress utilized  Taken 9/4/2023 1208 by Clare Brar LPN  Pressure Reduction Techniques: frequent weight shift encouraged  Pressure Reduction Devices: pressure-redistributing mattress utilized  Taken 9/4/2023 0816 by Clare Brar  LPN  Pressure Reduction Techniques: frequent weight shift encouraged  Pressure Reduction Devices: pressure-redistributing mattress utilized     Problem: Swallowing Impairment (Stroke, Ischemic/Transient Ischemic Attack)  Goal: Optimal Eating and Swallowing without Aspiration  Outcome: Ongoing, Progressing     Problem: Urinary Elimination Impaired (Stroke, Ischemic/Transient Ischemic Attack)  Goal: Effective Urinary Elimination  Outcome: Ongoing, Progressing   Goal Outcome Evaluation:

## 2023-09-04 NOTE — PLAN OF CARE
Problem: Fall Injury Risk  Goal: Absence of Fall and Fall-Related Injury  Outcome: Ongoing, Progressing  Intervention: Identify and Manage Contributors  Recent Flowsheet Documentation  Taken 9/3/2023 2000 by Dian Hodgson RN  Medication Review/Management: medications reviewed  Intervention: Promote Injury-Free Environment  Recent Flowsheet Documentation  Taken 9/4/2023 0000 by Dian Hodgson RN  Safety Promotion/Fall Prevention: safety round/check completed  Taken 9/3/2023 2200 by Dian Hodgson RN  Safety Promotion/Fall Prevention: safety round/check completed  Taken 9/3/2023 2000 by Dian Hodgson RN  Safety Promotion/Fall Prevention: safety round/check completed     Problem: Heart Failure Comorbidity  Goal: Maintenance of Heart Failure Symptom Control  Outcome: Ongoing, Progressing  Intervention: Maintain Heart Failure-Management  Recent Flowsheet Documentation  Taken 9/3/2023 2000 by Dian Hodgson RN  Medication Review/Management: medications reviewed     Problem: Hypertension Comorbidity  Goal: Blood Pressure in Desired Range  Outcome: Ongoing, Progressing  Intervention: Maintain Blood Pressure Management  Recent Flowsheet Documentation  Taken 9/3/2023 2000 by Dian Hodgson RN  Medication Review/Management: medications reviewed     Problem: Skin Injury Risk Increased  Goal: Skin Health and Integrity  Outcome: Ongoing, Progressing     Problem: Adjustment to Illness (Stroke, Ischemic/Transient Ischemic Attack)  Goal: Optimal Coping  Outcome: Ongoing, Progressing     Problem: Bowel Elimination Impaired (Stroke, Ischemic/Transient Ischemic Attack)  Goal: Effective Bowel Elimination  Outcome: Ongoing, Progressing     Problem: Cerebral Tissue Perfusion (Stroke, Ischemic/Transient Ischemic Attack)  Goal: Optimal Cerebral Tissue Perfusion  Outcome: Ongoing, Progressing     Problem: Cognitive Impairment (Stroke, Ischemic/Transient Ischemic Attack)  Goal: Optimal Cognitive  Function  Outcome: Ongoing, Progressing     Problem: Communication Impairment (Stroke, Ischemic/Transient Ischemic Attack)  Goal: Improved Communication Skills  Outcome: Ongoing, Progressing     Problem: Functional Ability Impaired (Stroke, Ischemic/Transient Ischemic Attack)  Goal: Optimal Functional Ability  Outcome: Ongoing, Progressing     Problem: Respiratory Compromise (Stroke, Ischemic/Transient Ischemic Attack)  Goal: Effective Oxygenation and Ventilation  Outcome: Ongoing, Progressing     Problem: Sensorimotor Impairment (Stroke, Ischemic/Transient Ischemic Attack)  Goal: Improved Sensorimotor Function  Outcome: Ongoing, Progressing     Problem: Swallowing Impairment (Stroke, Ischemic/Transient Ischemic Attack)  Goal: Optimal Eating and Swallowing without Aspiration  Outcome: Ongoing, Progressing     Problem: Urinary Elimination Impaired (Stroke, Ischemic/Transient Ischemic Attack)  Goal: Effective Urinary Elimination  Outcome: Ongoing, Progressing   Goal Outcome Evaluation:

## 2023-09-04 NOTE — PROGRESS NOTES
"DAILY PROGRESS NOTE  [facility]    Patient Identification:  Name: Jermaine Black  Age: 89 y.o.  Sex: male  :  1926  MRN: [id number]         Primary Care Physician: Provider, No Known    Subjective:  Interval History: He is sleeping peacefully.  No signs of distress.  Family not at bedside today.  Objective:    Scheduled Meds:carbidopa-levodopa ER, 1 tablet, Oral, TID  cholestyramine light, 1 packet, Oral, Daily  erythromycin, 1 application , Both Eyes, Nightly  finasteride, 5 mg, Oral, Daily  isosorbide mononitrate, 60 mg, Oral, Daily  levothyroxine, 75 mcg, Oral, Q AM  metoprolol succinate XL, 50 mg, Oral, Daily  rosuvastatin, 10 mg, Oral, Daily  senna-docusate sodium, 2 tablet, Oral, BID  sodium chloride, 10 mL, Intravenous, Q12H  tamsulosin, 0.4 mg, Oral, Daily      Continuous Infusions:     Vital signs in last 24 hours:  Temp:  [97 °F (36.1 °C)-98 °F (36.7 °C)] 97.5 °F (36.4 °C)  Heart Rate:  [70-98] 70  Resp:  [14-18] 16  BP: (124-167)/(62-89) 135/62    Intake/Output:    Intake/Output Summary (Last 24 hours) at 2023 1408  Last data filed at 2023 1300  Gross per 24 hour   Intake 715 ml   Output 300 ml   Net 415 ml         Exam:  /62 (BP Location: Right arm, Patient Position: Lying)   Pulse 70   Temp 97.5 °F (36.4 °C) (Oral)   Resp 16   Ht 167.6 cm (66\")   Wt 72.8 kg (160 lb 7.9 oz)   SpO2 94%   BMI 25.90 kg/m²     General Appearance:  Sleeping peacefully in bed   Head:    Normocephalic, without obvious abnormality, atraumatic   Eyes:       Throat:   Lips, tongue, gums normal   Neck:   Supple, symmetrical, trachea midline, no JVD   Lungs:     Clear to auscultation bilaterally, respirations unlabored   Chest Wall:    No tenderness or deformity    Heart:    Regular rate and rhythm, S1 and S2 normal, no murmur,no  rub or gallop   Abdomen:     Soft, nontender, bowel sounds active, no masses, no organomegaly    Extremities:   Extremities normal, atraumatic, no cyanosis or " edema   Pulses:      Skin:   Skin is warm and dry,  no rashes or palpable lesions   Neurologic: He is very weak and has Parkinson's      Lab Results (last 72 hours)       Procedure Component Value Units Date/Time    Urine Culture - Urine, Urine, Clean Catch [[id number]]  (Normal) Collected: 08/30/23 1823    Specimen: Urine, Clean Catch Updated: 08/31/23 2003     Urine Culture No growth    Blood Culture - Blood, Wrist, Right [[id number]]  (Normal) Collected: 08/30/23 1932    Specimen: Blood from Wrist, Right Updated: 08/31/23 1946     Blood Culture No growth at 24 hours    Narrative:      Less than seven (7) mL's of blood was collected.  Insufficient quantity may yield false negative results.    Blood Culture - Blood, Arm, Right [[id number]]  (Normal) Collected: 08/30/23 1932    Specimen: Blood from Arm, Right Updated: 08/31/23 1946     Blood Culture No growth at 24 hours    High Sensitivity Troponin T [[id number]]  (Abnormal) Collected: 08/31/23 0428    Specimen: Blood Updated: 08/31/23 0526     HS Troponin T 56 ng/L     Narrative:      High Sensitive Troponin T Reference Range:  <10.0 ng/L- Negative Female for AMI  <15.0 ng/L- Negative Male for AMI  >=10 - Abnormal Female indicating possible myocardial injury.  >=15 - Abnormal Male indicating possible myocardial injury.   Clinicians would have to utilize clinical acumen, EKG, Troponin, and serial changes to determine if it is an Acute Myocardial Infarction or myocardial injury due to an underlying chronic condition.         Comprehensive Metabolic Panel [[id number]]  (Abnormal) Collected: 08/31/23 0428    Specimen: Blood Updated: 08/31/23 0517     Glucose 94 mg/dL      BUN 24 mg/dL      Creatinine 1.00 mg/dL      Sodium 143 mmol/L      Potassium 3.8 mmol/L      Chloride 103 mmol/L      CO2 33.0 mmol/L      Calcium 9.0 mg/dL      Total Protein 6.1 g/dL      Albumin 3.6 g/dL      ALT (SGPT) 6 U/L      AST (SGOT) 21 U/L      Alkaline Phosphatase 52 U/L      Total Bilirubin  0.5 mg/dL      Globulin 2.5 gm/dL      A/G Ratio 1.4 g/dL      BUN/Creatinine Ratio 24.0     Anion Gap 7.0 mmol/L      eGFR 68.9 mL/min/1.73     Narrative:      GFR Normal >60  Chronic Kidney Disease <60  Kidney Failure <15    The GFR formula is only valid for adults with stable renal function between ages 18 and 70.    CBC Auto Differential [[id number]]  (Abnormal) Collected: 08/31/23 0428    Specimen: Blood Updated: 08/31/23 0459     WBC 10.10 10*3/mm3      RBC 3.85 10*6/mm3      Hemoglobin 12.8 g/dL      Hematocrit 38.1 %      MCV 99.0 fL      MCH 33.4 pg      MCHC 33.7 g/dL      RDW 14.3 %      RDW-SD 49.0 fl      MPV 9.2 fL      Platelets 161 10*3/mm3      Neutrophil % 78.2 %      Lymphocyte % 12.8 %      Monocyte % 7.7 %      Eosinophil % 0.9 %      Basophil % 0.4 %      Neutrophils, Absolute 7.90 10*3/mm3      Lymphocytes, Absolute 1.30 10*3/mm3      Monocytes, Absolute 0.80 10*3/mm3      Eosinophils, Absolute 0.10 10*3/mm3      Basophils, Absolute 0.00 10*3/mm3      nRBC 0.0 /100 WBC     High Sensitivity Troponin T 2Hr [[id number]]  (Abnormal) Collected: 08/30/23 1932    Specimen: Blood Updated: 08/30/23 2004     HS Troponin T 49 ng/L      Troponin T Delta -3 ng/L     Narrative:      High Sensitive Troponin T Reference Range:  <10.0 ng/L- Negative Female for AMI  <15.0 ng/L- Negative Male for AMI  >=10 - Abnormal Female indicating possible myocardial injury.  >=15 - Abnormal Male indicating possible myocardial injury.   Clinicians would have to utilize clinical acumen, EKG, Troponin, and serial changes to determine if it is an Acute Myocardial Infarction or myocardial injury due to an underlying chronic condition.         Comprehensive Metabolic Panel [[id number]]  (Abnormal) Collected: 08/30/23 1734    Specimen: Blood Updated: 08/30/23 1906     Glucose 146 mg/dL      BUN 29 mg/dL      Creatinine 1.07 mg/dL      Sodium 140 mmol/L      Potassium 3.8 mmol/L      Chloride 100 mmol/L      CO2 31.0 mmol/L       Calcium 8.7 mg/dL      Total Protein 6.2 g/dL      Albumin 3.6 g/dL      ALT (SGPT) <5 U/L      AST (SGOT) 18 U/L      Alkaline Phosphatase 55 U/L      Total Bilirubin 0.4 mg/dL      Globulin 2.6 gm/dL      A/G Ratio 1.4 g/dL      BUN/Creatinine Ratio 27.1     Anion Gap 9.0 mmol/L      eGFR 63.5 mL/min/1.73     Narrative:      GFR Normal >60  Chronic Kidney Disease <60  Kidney Failure <15    The GFR formula is only valid for adults with stable renal function between ages 18 and 70.    TSH [[id number]]  (Normal) Collected: 08/30/23 1734    Specimen: Blood Updated: 08/30/23 1854     TSH 1.720 uIU/mL     Urinalysis With Culture If Indicated - Urine, Clean Catch [[id number]]  (Abnormal) Collected: 08/30/23 1823    Specimen: Urine, Clean Catch Updated: 08/30/23 1835     Color, UA Yellow     Appearance, UA Clear     pH, UA <=5.0     Specific Gravity, UA 1.016     Glucose, UA Negative     Ketones, UA Trace     Bilirubin, UA Negative     Blood, UA Negative     Protein, UA Negative     Leuk Esterase, UA Small (1+)     Nitrite, UA Negative     Urobilinogen, UA 0.2 E.U./dL    Narrative:      In absence of clinical symptoms, the presence of pyuria, bacteria, and/or nitrites on the urinalysis result does not correlate with infection.    Urinalysis, Microscopic Only - Urine, Clean Catch [[id number]]  (Abnormal) Collected: 08/30/23 1823    Specimen: Urine, Clean Catch Updated: 08/30/23 1835     RBC, UA 0-2 /HPF      WBC, UA 21-30 /HPF      Bacteria, UA 4+ /HPF      Squamous Epithelial Cells, UA 0-2 /HPF      Hyaline Casts, UA 3-6 /LPF      Methodology Automated Microscopy    Magnesium [[id number]]  (Normal) Collected: 08/30/23 1734    Specimen: Blood Updated: 08/30/23 1825     Magnesium 2.1 mg/dL     High Sensitivity Troponin T [[id number]]  (Abnormal) Collected: 08/30/23 1734    Specimen: Blood Updated: 08/30/23 1814     HS Troponin T 52 ng/L     Narrative:      High Sensitive Troponin T Reference Range:  <10.0 ng/L- Negative Female  for AMI  <15.0 ng/L- Negative Male for AMI  >=10 - Abnormal Female indicating possible myocardial injury.  >=15 - Abnormal Male indicating possible myocardial injury.   Clinicians would have to utilize clinical acumen, EKG, Troponin, and serial changes to determine if it is an Acute Myocardial Infarction or myocardial injury due to an underlying chronic condition.         Protime-INR [[id number]]  (Normal) Collected: 08/30/23 1734    Specimen: Blood Updated: 08/30/23 1756     Protime 11.0 Seconds      INR 1.03    aPTT [[id number]]  (Abnormal) Collected: 08/30/23 1734    Specimen: Blood Updated: 08/30/23 1756     PTT 26.5 seconds     CBC & Differential [[id number]]  (Abnormal) Collected: 08/30/23 1734    Specimen: Blood Updated: 08/30/23 1741    Narrative:      The following orders were created for panel order CBC & Differential.  Procedure                               Abnormality         Status                     ---------                               -----------         ------                     CBC Auto Differential[[id number]]        Abnormal            Final result                 Please view results for these tests on the individual orders.    CBC Auto Differential [[id number]]  (Abnormal) Collected: 08/30/23 1734    Specimen: Blood Updated: 08/30/23 1741     WBC 7.20 10*3/mm3      RBC 3.73 10*6/mm3      Hemoglobin 12.3 g/dL      Hematocrit 36.0 %      MCV 96.5 fL      MCH 33.1 pg      MCHC 34.3 g/dL      RDW 14.3 %      RDW-SD 50.8 fl      MPV 8.8 fL      Platelets 159 10*3/mm3      Neutrophil % 72.7 %      Lymphocyte % 16.8 %      Monocyte % 8.5 %      Eosinophil % 1.4 %      Basophil % 0.6 %      Neutrophils, Absolute 5.20 10*3/mm3      Lymphocytes, Absolute 1.20 10*3/mm3      Monocytes, Absolute 0.60 10*3/mm3      Eosinophils, Absolute 0.10 10*3/mm3      Basophils, Absolute 0.00 10*3/mm3      nRBC 0.0 /100 WBC           Data Review:  Results from last 7 days   Lab Units 09/03/23  0306 09/02/23  0679  08/31/23  0428   SODIUM mmol/L 141 141 143   POTASSIUM mmol/L 4.1 4.4 3.8   CHLORIDE mmol/L 106 106 103   CO2 mmol/L 29.0 29.0 33.0*   BUN mg/dL 18 20 24*   CREATININE mg/dL 0.88 1.02 1.00   GLUCOSE mg/dL 106* 95 94   CALCIUM mg/dL 8.5 8.7 9.0       Results from last 7 days   Lab Units 09/03/23  0306 09/02/23  0638 08/31/23  0428   WBC 10*3/mm3 8.90 8.40 10.10   HEMOGLOBIN g/dL 12.4* 12.1* 12.8*   HEMATOCRIT % 37.5 36.0* 38.1   PLATELETS 10*3/mm3 158 144 161       Results from last 7 days   Lab Units 08/30/23  1734   TSH uIU/mL 1.720           Lab Results   Lab Value Date/Time    TROPONINT 56 (C) 08/31/2023 0428    TROPONINT 49 (H) 08/30/2023 1932    TROPONINT 52 (H) 08/30/2023 1734         Results from last 7 days   Lab Units 09/03/23  0306 09/02/23  0638 08/31/23  0428   ALK PHOS U/L 58 61 52   BILIRUBIN mg/dL 0.4 0.4 0.5   ALT (SGPT) U/L <5 <5 6   AST (SGOT) U/L 15 18 21       Results from last 7 days   Lab Units 08/30/23  1734   TSH uIU/mL 1.720           No results found for: POCGLU  Results from last 7 days   Lab Units 08/30/23  1734   INR  1.03           Assessment:  Active Hospital Problems    Diagnosis  POA    **Syncope [R55]  Yes    Parkinson's disease [G20]  Unknown    HTN (hypertension) [I10]  Unknown    HLD (hyperlipidemia) [E78.5]  Unknown    CAD (coronary artery disease) [I25.10]  Unknown    CHF (congestive heart failure) [I50.9]  Unknown    Dehydration [E86.0]  Unknown      Resolved Hospital Problems   No resolved problems to display.       Plan:  Continue to hold Lasix.  Cultures all negative and we stopped antibiotics.  Await pre-CERT for rehab at [facility].  DC planning. Discussed with family at bedside on September 2.  Awaiting insurance approval for [facility] rehab    Bradley Hewitt MD  9/4/2023  14:08 EDT

## 2023-09-05 VITALS
DIASTOLIC BLOOD PRESSURE: 52 MMHG | WEIGHT: 160.5 LBS | BODY MASS INDEX: 25.79 KG/M2 | OXYGEN SATURATION: 93 % | HEART RATE: 70 BPM | SYSTOLIC BLOOD PRESSURE: 117 MMHG | HEIGHT: 66 IN | TEMPERATURE: 97.7 F | RESPIRATION RATE: 18 BRPM

## 2023-09-05 PROCEDURE — G0378 HOSPITAL OBSERVATION PER HR: HCPCS

## 2023-09-05 PROCEDURE — 97530 THERAPEUTIC ACTIVITIES: CPT

## 2023-09-05 PROCEDURE — 97116 GAIT TRAINING THERAPY: CPT

## 2023-09-05 RX ADMIN — ISOSORBIDE MONONITRATE 60 MG: 60 TABLET, EXTENDED RELEASE ORAL at 08:53

## 2023-09-05 RX ADMIN — CHOLESTYRAMINE 4 G: 4 POWDER, FOR SUSPENSION ORAL at 08:53

## 2023-09-05 RX ADMIN — Medication 10 ML: at 08:50

## 2023-09-05 RX ADMIN — TAMSULOSIN HYDROCHLORIDE 0.4 MG: 0.4 CAPSULE ORAL at 08:53

## 2023-09-05 RX ADMIN — ROSUVASTATIN 10 MG: 10 TABLET, FILM COATED ORAL at 08:53

## 2023-09-05 RX ADMIN — CARBIDOPA AND LEVODOPA 1 TABLET: 25; 100 TABLET, EXTENDED RELEASE ORAL at 16:03

## 2023-09-05 RX ADMIN — FINASTERIDE 5 MG: 5 TABLET, FILM COATED ORAL at 08:53

## 2023-09-05 RX ADMIN — SENNOSIDES AND DOCUSATE SODIUM 2 TABLET: 50; 8.6 TABLET ORAL at 08:53

## 2023-09-05 RX ADMIN — METOPROLOL SUCCINATE 50 MG: 50 TABLET, EXTENDED RELEASE ORAL at 08:53

## 2023-09-05 RX ADMIN — CARBIDOPA AND LEVODOPA 1 TABLET: 25; 100 TABLET, EXTENDED RELEASE ORAL at 08:53

## 2023-09-05 RX ADMIN — LEVOTHYROXINE SODIUM 75 MCG: 0.12 TABLET ORAL at 06:02

## 2023-09-05 NOTE — PROGRESS NOTES
"DAILY PROGRESS NOTE  [facility]    Patient Identification:  Name: Jermaine Black  Age: 89 y.o.  Sex: male  :  1926  MRN: [id number]         Primary Care Physician: Provider, No Known    Subjective:  Interval History: He is resting peacefully.  No signs of distress.  Family not at bedside today.  No new complaints.  Awaiting [facility] rehab  Objective:    Scheduled Meds:carbidopa-levodopa ER, 1 tablet, Oral, TID  cholestyramine light, 1 packet, Oral, Daily  erythromycin, 1 application , Both Eyes, Nightly  finasteride, 5 mg, Oral, Daily  isosorbide mononitrate, 60 mg, Oral, Daily  levothyroxine, 75 mcg, Oral, Q AM  metoprolol succinate XL, 50 mg, Oral, Daily  rosuvastatin, 10 mg, Oral, Daily  senna-docusate sodium, 2 tablet, Oral, BID  sodium chloride, 10 mL, Intravenous, Q12H  tamsulosin, 0.4 mg, Oral, Daily      Continuous Infusions:     Vital signs in last 24 hours:  Temp:  [96.8 °F (36 °C)-98.1 °F (36.7 °C)] 96.8 °F (36 °C)  Heart Rate:  [70-71] 70  Resp:  [14-19] 19  BP: (112-147)/(55-72) 147/72    Intake/Output:    Intake/Output Summary (Last 24 hours) at 2023 1501  Last data filed at 2023 1300  Gross per 24 hour   Intake 240 ml   Output 1080 ml   Net -840 ml         Exam:  /72 (BP Location: Left arm, Patient Position: Lying)   Pulse 70   Temp 96.8 °F (36 °C) (Axillary)   Resp 19   Ht 167.6 cm (66\")   Wt 72.8 kg (160 lb 7.9 oz)   SpO2 93%   BMI 25.90 kg/m²     General Appearance:  Laying peacefully in bed   Head:    Normocephalic, without obvious abnormality, atraumatic   Eyes:       Throat:   Lips, tongue, gums normal   Neck:   Supple, symmetrical, trachea midline, no JVD   Lungs:     Clear to auscultation bilaterally, respirations unlabored   Chest Wall:    No tenderness or deformity    Heart:    Regular rate and rhythm, S1 and S2 normal, no murmur,no  rub or gallop   Abdomen:     Soft, nontender, bowel sounds active, no masses, no organomegaly  "   Extremities:   Extremities normal, atraumatic, no cyanosis or edema   Pulses:      Skin:   Skin is warm and dry,  no rashes or palpable lesions   Neurologic: He is very weak and has Parkinson's      Lab Results (last 72 hours)       Procedure Component Value Units Date/Time    Urine Culture - Urine, Urine, Clean Catch [[id number]]  (Normal) Collected: 08/30/23 1823    Specimen: Urine, Clean Catch Updated: 08/31/23 2003     Urine Culture No growth    Blood Culture - Blood, Wrist, Right [[id number]]  (Normal) Collected: 08/30/23 1932    Specimen: Blood from Wrist, Right Updated: 08/31/23 1946     Blood Culture No growth at 24 hours    Narrative:      Less than seven (7) mL's of blood was collected.  Insufficient quantity may yield false negative results.    Blood Culture - Blood, Arm, Right [[id number]]  (Normal) Collected: 08/30/23 1932    Specimen: Blood from Arm, Right Updated: 08/31/23 1946     Blood Culture No growth at 24 hours    High Sensitivity Troponin T [[id number]]  (Abnormal) Collected: 08/31/23 0428    Specimen: Blood Updated: 08/31/23 0526     HS Troponin T 56 ng/L     Narrative:      High Sensitive Troponin T Reference Range:  <10.0 ng/L- Negative Female for AMI  <15.0 ng/L- Negative Male for AMI  >=10 - Abnormal Female indicating possible myocardial injury.  >=15 - Abnormal Male indicating possible myocardial injury.   Clinicians would have to utilize clinical acumen, EKG, Troponin, and serial changes to determine if it is an Acute Myocardial Infarction or myocardial injury due to an underlying chronic condition.         Comprehensive Metabolic Panel [[id number]]  (Abnormal) Collected: 08/31/23 0428    Specimen: Blood Updated: 08/31/23 0517     Glucose 94 mg/dL      BUN 24 mg/dL      Creatinine 1.00 mg/dL      Sodium 143 mmol/L      Potassium 3.8 mmol/L      Chloride 103 mmol/L      CO2 33.0 mmol/L      Calcium 9.0 mg/dL      Total Protein 6.1 g/dL      Albumin 3.6 g/dL      ALT (SGPT) 6 U/L      AST  (SGOT) 21 U/L      Alkaline Phosphatase 52 U/L      Total Bilirubin 0.5 mg/dL      Globulin 2.5 gm/dL      A/G Ratio 1.4 g/dL      BUN/Creatinine Ratio 24.0     Anion Gap 7.0 mmol/L      eGFR 68.9 mL/min/1.73     Narrative:      GFR Normal >60  Chronic Kidney Disease <60  Kidney Failure <15    The GFR formula is only valid for adults with stable renal function between ages 18 and 70.    CBC Auto Differential [[id number]]  (Abnormal) Collected: 08/31/23 0428    Specimen: Blood Updated: 08/31/23 0459     WBC 10.10 10*3/mm3      RBC 3.85 10*6/mm3      Hemoglobin 12.8 g/dL      Hematocrit 38.1 %      MCV 99.0 fL      MCH 33.4 pg      MCHC 33.7 g/dL      RDW 14.3 %      RDW-SD 49.0 fl      MPV 9.2 fL      Platelets 161 10*3/mm3      Neutrophil % 78.2 %      Lymphocyte % 12.8 %      Monocyte % 7.7 %      Eosinophil % 0.9 %      Basophil % 0.4 %      Neutrophils, Absolute 7.90 10*3/mm3      Lymphocytes, Absolute 1.30 10*3/mm3      Monocytes, Absolute 0.80 10*3/mm3      Eosinophils, Absolute 0.10 10*3/mm3      Basophils, Absolute 0.00 10*3/mm3      nRBC 0.0 /100 WBC     High Sensitivity Troponin T 2Hr [[id number]]  (Abnormal) Collected: 08/30/23 1932    Specimen: Blood Updated: 08/30/23 2004     HS Troponin T 49 ng/L      Troponin T Delta -3 ng/L     Narrative:      High Sensitive Troponin T Reference Range:  <10.0 ng/L- Negative Female for AMI  <15.0 ng/L- Negative Male for AMI  >=10 - Abnormal Female indicating possible myocardial injury.  >=15 - Abnormal Male indicating possible myocardial injury.   Clinicians would have to utilize clinical acumen, EKG, Troponin, and serial changes to determine if it is an Acute Myocardial Infarction or myocardial injury due to an underlying chronic condition.         Comprehensive Metabolic Panel [[id number]]  (Abnormal) Collected: 08/30/23 2204    Specimen: Blood Updated: 08/30/23 1906     Glucose 146 mg/dL      BUN 29 mg/dL      Creatinine 1.07 mg/dL      Sodium 140 mmol/L      Potassium  3.8 mmol/L      Chloride 100 mmol/L      CO2 31.0 mmol/L      Calcium 8.7 mg/dL      Total Protein 6.2 g/dL      Albumin 3.6 g/dL      ALT (SGPT) <5 U/L      AST (SGOT) 18 U/L      Alkaline Phosphatase 55 U/L      Total Bilirubin 0.4 mg/dL      Globulin 2.6 gm/dL      A/G Ratio 1.4 g/dL      BUN/Creatinine Ratio 27.1     Anion Gap 9.0 mmol/L      eGFR 63.5 mL/min/1.73     Narrative:      GFR Normal >60  Chronic Kidney Disease <60  Kidney Failure <15    The GFR formula is only valid for adults with stable renal function between ages 18 and 70.    TSH [[id number]]  (Normal) Collected: 08/30/23 1734    Specimen: Blood Updated: 08/30/23 1854     TSH 1.720 uIU/mL     Urinalysis With Culture If Indicated - Urine, Clean Catch [[id number]]  (Abnormal) Collected: 08/30/23 1823    Specimen: Urine, Clean Catch Updated: 08/30/23 1835     Color, UA Yellow     Appearance, UA Clear     pH, UA <=5.0     Specific Gravity, UA 1.016     Glucose, UA Negative     Ketones, UA Trace     Bilirubin, UA Negative     Blood, UA Negative     Protein, UA Negative     Leuk Esterase, UA Small (1+)     Nitrite, UA Negative     Urobilinogen, UA 0.2 E.U./dL    Narrative:      In absence of clinical symptoms, the presence of pyuria, bacteria, and/or nitrites on the urinalysis result does not correlate with infection.    Urinalysis, Microscopic Only - Urine, Clean Catch [[id number]]  (Abnormal) Collected: 08/30/23 1823    Specimen: Urine, Clean Catch Updated: 08/30/23 1835     RBC, UA 0-2 /HPF      WBC, UA 21-30 /HPF      Bacteria, UA 4+ /HPF      Squamous Epithelial Cells, UA 0-2 /HPF      Hyaline Casts, UA 3-6 /LPF      Methodology Automated Microscopy    Magnesium [[id number]]  (Normal) Collected: 08/30/23 1734    Specimen: Blood Updated: 08/30/23 1825     Magnesium 2.1 mg/dL     High Sensitivity Troponin T [[id number]]  (Abnormal) Collected: 08/30/23 1734    Specimen: Blood Updated: 08/30/23 1814     HS Troponin T 52 ng/L     Narrative:      High  Sensitive Troponin T Reference Range:  <10.0 ng/L- Negative Female for AMI  <15.0 ng/L- Negative Male for AMI  >=10 - Abnormal Female indicating possible myocardial injury.  >=15 - Abnormal Male indicating possible myocardial injury.   Clinicians would have to utilize clinical acumen, EKG, Troponin, and serial changes to determine if it is an Acute Myocardial Infarction or myocardial injury due to an underlying chronic condition.         Protime-INR [[id number]]  (Normal) Collected: 08/30/23 1734    Specimen: Blood Updated: 08/30/23 1756     Protime 11.0 Seconds      INR 1.03    aPTT [[id number]]  (Abnormal) Collected: 08/30/23 1734    Specimen: Blood Updated: 08/30/23 1756     PTT 26.5 seconds     CBC & Differential [[id number]]  (Abnormal) Collected: 08/30/23 1734    Specimen: Blood Updated: 08/30/23 1741    Narrative:      The following orders were created for panel order CBC & Differential.  Procedure                               Abnormality         Status                     ---------                               -----------         ------                     CBC Auto Differential[[id number]]        Abnormal            Final result                 Please view results for these tests on the individual orders.    CBC Auto Differential [[id number]]  (Abnormal) Collected: 08/30/23 1734    Specimen: Blood Updated: 08/30/23 1741     WBC 7.20 10*3/mm3      RBC 3.73 10*6/mm3      Hemoglobin 12.3 g/dL      Hematocrit 36.0 %      MCV 96.5 fL      MCH 33.1 pg      MCHC 34.3 g/dL      RDW 14.3 %      RDW-SD 50.8 fl      MPV 8.8 fL      Platelets 159 10*3/mm3      Neutrophil % 72.7 %      Lymphocyte % 16.8 %      Monocyte % 8.5 %      Eosinophil % 1.4 %      Basophil % 0.6 %      Neutrophils, Absolute 5.20 10*3/mm3      Lymphocytes, Absolute 1.20 10*3/mm3      Monocytes, Absolute 0.60 10*3/mm3      Eosinophils, Absolute 0.10 10*3/mm3      Basophils, Absolute 0.00 10*3/mm3      nRBC 0.0 /100 WBC           Data Review:  Results  from last 7 days   Lab Units 09/03/23  0306 09/02/23  0638 08/31/23  0428   SODIUM mmol/L 141 141 143   POTASSIUM mmol/L 4.1 4.4 3.8   CHLORIDE mmol/L 106 106 103   CO2 mmol/L 29.0 29.0 33.0*   BUN mg/dL 18 20 24*   CREATININE mg/dL 0.88 1.02 1.00   GLUCOSE mg/dL 106* 95 94   CALCIUM mg/dL 8.5 8.7 9.0       Results from last 7 days   Lab Units 09/03/23  0306 09/02/23  0638 08/31/23  0428   WBC 10*3/mm3 8.90 8.40 10.10   HEMOGLOBIN g/dL 12.4* 12.1* 12.8*   HEMATOCRIT % 37.5 36.0* 38.1   PLATELETS 10*3/mm3 158 144 161       Results from last 7 days   Lab Units 08/30/23  1734   TSH uIU/mL 1.720           Lab Results   Lab Value Date/Time    TROPONINT 56 (C) 08/31/2023 0428    TROPONINT 49 (H) 08/30/2023 1932    TROPONINT 52 (H) 08/30/2023 1734         Results from last 7 days   Lab Units 09/03/23  0306 09/02/23  0638 08/31/23  0428   ALK PHOS U/L 58 61 52   BILIRUBIN mg/dL 0.4 0.4 0.5   ALT (SGPT) U/L <5 <5 6   AST (SGOT) U/L 15 18 21       Results from last 7 days   Lab Units 08/30/23  1734   TSH uIU/mL 1.720           No results found for: POCGLU  Results from last 7 days   Lab Units 08/30/23  1734   INR  1.03           Assessment:  Active Hospital Problems    Diagnosis  POA    **Syncope [R55]  Yes    Parkinson's disease [G20]  Unknown    HTN (hypertension) [I10]  Unknown    HLD (hyperlipidemia) [E78.5]  Unknown    CAD (coronary artery disease) [I25.10]  Unknown    CHF (congestive heart failure) [I50.9]  Unknown    Dehydration [E86.0]  Unknown      Resolved Hospital Problems   No resolved problems to display.       Plan:  Continue to hold Lasix.  Cultures all negative and we stopped antibiotics.  Await pre-CERT for rehab at [facility].  DC planning. Discussed with family at bedside on September 2.  [facility]    Bradley Hewitt MD  9/5/2023  15:01 EDT

## 2023-09-05 NOTE — CASE MANAGEMENT/SOCIAL WORK
Continued Stay Note  LOIDA Atwood     Patient Name: Jermaine Black  MRN: [id number]  Today's Date: 9/5/2023    Admit Date: 8/30/2023    Plan: Accepted at [facility]. Precert pending.   Discharge Plan       Row Name 09/05/23 1505       Plan    Plan Comments Precert remains pending per liaison at [facility]. Updated PT/OT notes requested from therapy.                      Expected Discharge Date and Time       Expected Discharge Date Expected Discharge Time    Sep 6, 2023               Pinky Johnson RN

## 2023-09-05 NOTE — CONSULTS
"Nutrition Services    Patient Name: Jermaine Black  YOB: 1926  MRN: [id number]  Admission date: 8/30/2023    Comment:  Addition of Boost Plus BID (Provides 720 kcals, 28 g protein if consumed)     Moderate chronic disease related malnutrition related to multiple chronic diseases as evidenced by PO intake meeting less than 75% of estimated energy requirement for greater than or equal to 1 month, and moderate + severe (overall moderate) muscle and fat wasting per NFPE.     See MSA below.    PPE Documentation        PPE Worn By Provider gloves   PPE Worn By Patient  N/A     CLINICAL NUTRITION ASSESSMENT      Reason for Assessment 9/5: MST: 2     H&P      History reviewed. No pertinent past medical history.    History reviewed. No pertinent surgical history.     Current Problems   Brief episode of unconsciousness, syncopal event  -pt back to baseline  -PT/OT    UTI  -abx    CAD s/p multiple stents, HLD       Encounter Information        Trending Narrative     9/5: Pt admitted to [facility] after syncopal event. Lasix being held. Plan for discharge to [facility] soon. RD visited pt at bedside. Pt reported he generally eats 2 meals daily, he does not eat snacks, and he drinks at least one ONS daily. Pt denied allergies and difficulty chewing/swallowing. Pt agreeable to ONS here. Pt drank Boost at time of RD visit. Pt unsure how much wt he has lost lately but he noted that family and friends have stated he looks like he has lost wt. NFPE completed, consistent with nutrition diagnosis of malnutrition using AND/ASPEN criteria. See MSA below.        Anthropometrics        Current Height, Weight Height: 167.6 cm (66\")  Weight: 72.8 kg (160 lb 7.9 oz) (09/04/23 0343)       Ideal Body Weight (IBW) 142#   Usual Body Weight (UBW) unknown       Trending Weight Hx     This admission: 9/5: 160# - scale             PTA: No prior wt hx to review    Wt Readings from Last 30 Encounters:   09/04/23 0343 72.8 kg (160 lb 7.9 oz) "   09/03/23 0413 73.5 kg (162 lb 0.6 oz)   08/30/23 1641 86.2 kg (190 lb)      BMI kg/m2 Body mass index is 25.9 kg/m².       Labs        Pertinent Labs    Results from last 7 days   Lab Units 09/03/23  0306 09/02/23 0638 08/31/23  0428   SODIUM mmol/L 141 141 143   POTASSIUM mmol/L 4.1 4.4 3.8   CHLORIDE mmol/L 106 106 103   CO2 mmol/L 29.0 29.0 33.0*   BUN mg/dL 18 20 24*   CREATININE mg/dL 0.88 1.02 1.00   CALCIUM mg/dL 8.5 8.7 9.0   BILIRUBIN mg/dL 0.4 0.4 0.5   ALK PHOS U/L 58 61 52   ALT (SGPT) U/L <5 <5 6   AST (SGOT) U/L 15 18 21   GLUCOSE mg/dL 106* 95 94     Results from last 7 days   Lab Units 09/03/23  0306 09/02/23 0638 08/31/23 0428 08/30/23  1734   MAGNESIUM mg/dL 2.0 2.1  --  2.1   PHOSPHORUS mg/dL 2.9 2.9   < >  --    HEMOGLOBIN g/dL 12.4* 12.1*   < > 12.3*   HEMATOCRIT % 37.5 36.0*   < > 36.0*    < > = values in this interval not displayed.     No results found for: COVID19  Lab Results   Component Value Date    HGBA1C 6.1 (H) 06/21/2022        Medications    Scheduled Medications carbidopa-levodopa ER, 1 tablet, Oral, TID  cholestyramine light, 1 packet, Oral, Daily  erythromycin, 1 application , Both Eyes, Nightly  finasteride, 5 mg, Oral, Daily  isosorbide mononitrate, 60 mg, Oral, Daily  levothyroxine, 75 mcg, Oral, Q AM  metoprolol succinate XL, 50 mg, Oral, Daily  rosuvastatin, 10 mg, Oral, Daily  senna-docusate sodium, 2 tablet, Oral, BID  sodium chloride, 10 mL, Intravenous, Q12H  tamsulosin, 0.4 mg, Oral, Daily        Infusions      PRN Medications   senna-docusate sodium **AND** polyethylene glycol **AND** bisacodyl **AND** bisacodyl    carboxymethylcellulose sod    nitroglycerin    [COMPLETED] Insert Peripheral IV **AND** sodium chloride    sodium chloride    sodium chloride     Physical Findings        Trending Physical   Appearance, NFPE 9/5: NFPE completed, consistent with nutrition diagnosis of malnutrition using AND/ASPEN criteria. See MSA below.      --  Edema  None documented       Bowel Function + BM on 9/3     Tubes None      Chewing/Swallowing No reported difficulty      Skin Coccyx wound, not open. WOCN assessment 9/5     --  Current Nutrition Orders & Evaluation of Intake       Oral Nutrition     Food Allergies NKFA   Current PO Diet Diet: Regular/House Diet; Texture: Regular Texture (IDDSI 7); Fluid Consistency: Thin (IDDSI 0)   Supplement -   PO Evaluation     Trending % PO Intake 9/5: %   --  Nutritional Risk Screening        NRS-2002 Score          Nutrition Diagnosis         Nutrition Dx Problem 1 Moderate chronic disease related malnutrition related to multiple chronic diseases as evidenced by PO intake meeting less than 75% of estimated energy requirement for greater than or equal to 1 month, and moderate + severe (overall moderate) muscle and fat wasting per NFPE.       Nutrition Dx Problem 2        Intervention Goal         Intervention Goal(s) PO intake > 75%, ONS acceptance      Nutrition Intervention        RD Action Addition of Boost Plus BID (Provides 720 kcals, 28 g protein if consumed),  NFPE completed.     Nutrition Prescription          Diet Prescription regular   Supplement Prescription Addition of Boost Plus BID (Provides 720 kcals, 28 g protein if consumed)      --  Monitor/Evaluation        Monitor Per protocol, PO intake, Supplement intake, Pertinent labs, Weight     Malnutrition Severity Assessment      Patient meets criteria for : Moderate (non-severe) Malnutrition  Malnutrition Type (last 8 hours)       Malnutrition Severity Assessment       Row Name 09/05/23 1534       Malnutrition Severity Assessment    Malnutrition Type Chronic Disease - Related Malnutrition      Row Name 09/05/23 1534       Insufficient Energy Intake     Insufficient Energy Intake Findings Moderate    Insufficient Energy Intake  <75% of est. energy requirement for > or equal to 1 month      Row Name 09/05/23 1534       Muscle Loss    Loss of Muscle Mass Findings Severe    [place]  Region Severe - deep hollowing/scooping, lack of muscle to touch, facial bones well defined    Clavicle Bone Region Severe - protruding prominent bone    Acromion Bone Region Moderate - acromion may slightly protrude    Scapular Bone Region Moderate - mild depression, bones may show slightly    Dorsal Hand Region Moderate - slight depression    Patellar Region Severe - prominent bone, square looking, very little muscle definition    Anterior Thigh Region Severe - line/depression along thigh, obviously thin    Posterior Calf Region Severe - thin with very little definition/firmness      Row Name 09/05/23 1534       Fat Loss    Subcutaneous Fat Loss Findings Moderate    Orbital Region  Moderate -  somewhat hollowness, slightly dark circles    Upper Arm Region Moderate - some fat tissue, not ample      Row Name 09/05/23 1534       Criteria Met (Must meet criteria for severity in at least 2 of these categories: M Wasting, Fat Loss, Fluid, Secondary Signs, Wt. Status, Intake)    Patient meets criteria for  Moderate (non-severe) Malnutrition                       Electronically signed by:  Olga Moody RD  09/05/23 11:31 EDT

## 2023-09-05 NOTE — PLAN OF CARE
Fair shift spent, medicated as ordered, assisted with the urinal, nil complaints voiced, care and observation continues    Goal Outcome Evaluation:    Problem: Fall Injury Risk  Goal: Absence of Fall and Fall-Related Injury  Outcome: Ongoing, Progressing  Intervention: Identify and Manage Contributors  Recent Flowsheet Documentation  Taken 9/5/2023 0400 by Abdi Guerin RN  Medication Review/Management: medications reviewed  Taken 9/5/2023 0200 by Abdi Guerin RN  Medication Review/Management: medications reviewed  Taken 9/5/2023 0000 by Abdi Guerin RN  Medication Review/Management: medications reviewed  Taken 9/4/2023 2200 by Abdi Guerin RN  Medication Review/Management: medications reviewed  Taken 9/4/2023 2000 by Abdi Guerin RN  Medication Review/Management: medications reviewed  Intervention: Promote Injury-Free Environment  Recent Flowsheet Documentation  Taken 9/5/2023 0400 by Abdi Guerin RN  Safety Promotion/Fall Prevention:   assistive device/personal items within reach   clutter free environment maintained   fall prevention program maintained   safety round/check completed   room organization consistent   nonskid shoes/slippers when out of bed  Taken 9/5/2023 0200 by Abdi Guerin RN  Safety Promotion/Fall Prevention:   assistive device/personal items within reach   clutter free environment maintained   fall prevention program maintained  Taken 9/5/2023 0000 by Abdi Guerin RN  Safety Promotion/Fall Prevention:   assistive device/personal items within reach   clutter free environment maintained   fall prevention program maintained   room organization consistent   safety round/check completed  Taken 9/4/2023 2200 by Abdi Guerin RN  Safety Promotion/Fall Prevention:   assistive device/personal items within reach   clutter free environment maintained   fall prevention program maintained   nonskid shoes/slippers when out of bed   room organization consistent    safety round/check completed  Taken 9/4/2023 2000 by Abdi Guerin, RN  Safety Promotion/Fall Prevention:   assistive device/personal items within reach   clutter free environment maintained   fall prevention program maintained   nonskid shoes/slippers when out of bed   room organization consistent   safety round/check completed     Problem: Adjustment to Illness (Stroke, Ischemic/Transient Ischemic Attack)  Goal: Optimal Coping  Outcome: Ongoing, Progressing  Intervention: Support Psychosocial Response to Stroke  Recent Flowsheet Documentation  Taken 9/4/2023 2000 by Abdi Guerin, RN  Supportive Measures: active listening utilized     Problem: Cerebral Tissue Perfusion (Stroke, Ischemic/Transient Ischemic Attack)  Goal: Optimal Cerebral Tissue Perfusion  Outcome: Ongoing, Progressing  Intervention: Protect and Optimize Cerebral Perfusion  Recent Flowsheet Documentation  Taken 9/4/2023 2000 by Abdi Guerin, RN  Sensory Stimulation Regulation:   care clustered   lighting decreased  Cerebral Perfusion Promotion: blood pressure monitored     Problem: Urinary Elimination Impaired (Stroke, Ischemic/Transient Ischemic Attack)  Goal: Effective Urinary Elimination  Outcome: Ongoing, Progressing

## 2023-09-05 NOTE — CASE MANAGEMENT/SOCIAL WORK
Continued Stay Note   Rai     Patient Name: Jermaine Black  MRN: [id number]  Today's Date: 9/5/2023    Admit Date: 8/30/2023    Plan: Accepted to [facility], precert approved 9/5 with bed available today.   Discharge Plan       Row Name 09/05/23 3876       Plan    Plan Accepted to [facility], precert approved 9/5 with bed available today.    Plan Comments Received text from liaison raven approved with bed available today. MD notified.      Row Name 09/05/23 4640       Plan    Plan Comments Precert remains pending per liaison at [facility]. Updated PT/OT notes requested from therapy.                      Expected Discharge Date and Time       Expected Discharge Date Expected Discharge Time    Sep 5, 2023               Pinky Johnson RN

## 2023-09-05 NOTE — NURSING NOTE
Patient presenting with an area of pink dry peeling skin to the sacral area.  The area appears not open.  Recommend to implement pressure injury prevention strategies and follow hospital protocol for  prevention

## 2023-09-05 NOTE — CASE MANAGEMENT/SOCIAL WORK
Continued Stay Note  [facility]     Patient Name: Jermaine Black  MRN: [id number]  Today's Date: 9/5/2023    Admit Date: 8/30/2023    Plan: Accepted to [facility], raven approved 9/5 with bed available today.   Discharge Plan       Row Name 09/05/23 1650       Plan    Plan Comments Arranged transportation with [facility] to [facility] today.      Row Name 09/05/23 1556       Plan    Plan Accepted to [facility], raven approved 9/5 with bed available today.    Plan Comments Received text from liaison raven approved with bed available today. MD notified.      Row Name 09/05/23 1505       Plan    Plan Comments Precert remains pending per liaison at [facility]. Updated PT/OT notes requested from therapy.                      Expected Discharge Date and Time       Expected Discharge Date Expected Discharge Time    Sep 5, 2023               Pinky Johnson RN

## 2023-09-05 NOTE — PLAN OF CARE
Goal Outcome Evaluation:           Assessment: Jermaine Black presents with functional mobility impairments which indicate the need for skilled intervention. Tolerating session today without incident. Pt agreeable to skilled interventions performed. Will continue to follow and progress as tolerated.     Plan/Recommendations:   High Intensity Therapy recommended post-acute care. This is recommended as therapy feels the patient would require 5-6 days per week, 2-3 hours per day. At this time, inpatient rehabilitation (acute rehab) would be the first choice and SNF would be second.. Pt requires no DME at discharge.     Pt desires Inpatient Rehabilitation placement at discharge. Pt cooperative; agreeable to therapeutic recommendations and plan of care.

## 2023-09-05 NOTE — DISCHARGE SUMMARY
[facility] Medicine Services  DISCHARGE SUMMARY    Patient Name: Jermaine Black  : 1926  MRN: [id number]    Discharge condition: Stable  Date of Admission: 2023  Discharge Diagnosis: Generalized weakness falls syncope  Date of Discharge:  23  Primary Care Physician: Provider, No Known    Presenting Problem:   Subarachnoid hemorrhage [I60.9]  Syncope [R55]  Urinary tract infection with hematuria, site unspecified [N39.0, R31.9]  Syncope, unspecified syncope type [R55]    Active and Resolved Hospital Problems:  Active Hospital Problems    Diagnosis POA    **Syncope [R55] Yes    Parkinson's disease [G20] Unknown    HTN (hypertension) [I10] Unknown    HLD (hyperlipidemia) [E78.5] Unknown    CAD (coronary artery disease) [I25.10] Unknown    CHF (congestive heart failure) [I50.9] Unknown    Dehydration [E86.0] Unknown      Resolved Hospital Problems   No resolved problems to display.       Hospital Course     Hospital Course:  Jermaine Black is a 89 y.o. male who presented to the hospital with syncope.  Imaging revealed hypodensity consistent with possible small hemorrhage.  The patient is not on any blood thinners.  Repeat imaging did not really confirm any kind of hemorrhage but likely just a bruise.  The patient was worked with PT OT.  He was feeling a lot better after hydration.  Antibiotics were stopped after UTI was ruled out.  Patient was set up with [facility] rehab and once a bed was available and insurance approval obtained he was discharged to acute rehab to get stronger.              Reasons For Change In Medications and Indications for New Medications:      Day of Discharge     Vital Signs:  Temp:  [96.8 °F (36 °C)-98.1 °F (36.7 °C)] 96.8 °F (36 °C)  Heart Rate:  [70-71] 70  Resp:  [14-19] 19  BP: (112-147)/(55-72) 147/72    Physical Exam:  Physical Exam  Vitals reviewed.   Constitutional:       Comments: Appears quite well for 89 years old   HENT:       Head: Normocephalic.   Cardiovascular:      Rate and Rhythm: Normal rate.   Pulmonary:      Effort: Pulmonary effort is normal.   Abdominal:      General: Abdomen is flat.      Palpations: Abdomen is soft.   Musculoskeletal:         General: Normal range of motion.      Cervical back: Normal range of motion.   Skin:     General: Skin is warm.   Neurological:      General: No focal deficit present.      Mental Status: He is alert and oriented to person, place, and time.   Psychiatric:         Mood and Affect: Mood normal.         Behavior: Behavior normal.          Pertinent  and/or Most Recent Results     LAB RESULTS:      Lab 09/03/23 0306 09/02/23 0638 08/31/23 0428 08/30/23  1734   WBC 8.90 8.40 10.10 7.20   HEMOGLOBIN 12.4* 12.1* 12.8* 12.3*   HEMATOCRIT 37.5 36.0* 38.1 36.0*   PLATELETS 158 144 161 159   NEUTROS ABS 6.20 5.90 7.90* 5.20   LYMPHS ABS 1.70 1.60 1.30 1.20   MONOS ABS 0.70 0.70 0.80 0.60   EOS ABS 0.20 0.20 0.10 0.10   MCV 98.6* 96.9 99.0* 96.5   PROTIME  --   --   --  11.0   APTT  --   --   --  26.5*         Lab 09/03/23 0306 09/02/23 0638 08/31/23 0428 08/30/23  1734   SODIUM 141 141 143 140   POTASSIUM 4.1 4.4 3.8 3.8   CHLORIDE 106 106 103 100   CO2 29.0 29.0 33.0* 31.0*   ANION GAP 6.0 6.0 7.0 9.0   BUN 18 20 24* 29*   CREATININE 0.88 1.02 1.00 1.07   EGFR 78.7 67.3 68.9 63.5   GLUCOSE 106* 95 94 146*   CALCIUM 8.5 8.7 9.0 8.7   MAGNESIUM 2.0 2.1  --  2.1   PHOSPHORUS 2.9 2.9  --   --    TSH  --   --   --  1.720         Lab 09/03/23 0306 09/02/23 0638 08/31/23 0428 08/30/23  1734   TOTAL PROTEIN 5.5* 5.4* 6.1 6.2   ALBUMIN 3.3* 3.1* 3.6 3.6   GLOBULIN 2.2 2.3 2.5 2.6   ALT (SGPT) <5 <5 6 <5   AST (SGOT) 15 18 21 18   BILIRUBIN 0.4 0.4 0.5 0.4   ALK PHOS 58 61 52 55         Lab 08/31/23  0428 08/30/23  1932 08/30/23  1734   HSTROP T 56* 49* 52*   PROTIME  --   --  11.0   INR  --   --  1.03                 Brief Urine Lab Results  (Last result in the past 365 days)        Color    Clarity   Blood   Leuk Est   Nitrite   Protein   CREAT   Urine HCG        08/30/23 1823 Yellow   Clear   Negative   Small (1+)   Negative   Negative                 Microbiology Results (last 10 days)       Procedure Component Value - Date/Time    Blood Culture - Blood, Wrist, Right [[id number]]  (Normal) Collected: 08/30/23 1932    Lab Status: Final result Specimen: Blood from Wrist, Right Updated: 09/04/23 1946     Blood Culture No growth at 5 days    Narrative:      Less than seven (7) mL's of blood was collected.  Insufficient quantity may yield false negative results.    Blood Culture - Blood, Arm, Right [[id number]]  (Normal) Collected: 08/30/23 1932    Lab Status: Final result Specimen: Blood from Arm, Right Updated: 09/04/23 1946     Blood Culture No growth at 5 days    Urine Culture - Urine, Urine, Clean Catch [[id number]]  (Normal) Collected: 08/30/23 1823    Lab Status: Final result Specimen: Urine, Clean Catch Updated: 08/31/23 2003     Urine Culture No growth            CT Head Without Contrast    Result Date: 8/31/2023  Impression: Impression: 1.Left frontal lobe hyperdensity is essentially unchanged from prior studies. Given the lack of evolution, this most likely represents a calcification rather than an intracranial hemorrhage. No definite intracranial hemorrhage. 2.Unchanged right frontal convexity low density fluid collection measuring 1.2 cm. Electronically Signed: Chemo Vargas DO  8/31/2023 6:31 PM EDT  Workstation ID: [id number]    CT Head Without Contrast    Result Date: 8/31/2023  Impression: Impression: 1. Stable 3-4 mm subtle hyperdensity adjacent to the left superior frontal gyrus. In the absence of trauma, this may represent a parenchymal calcification. Otherwise, this still could represent a small focus of subarachnoid hemorrhage. There do not appear to be significant external signs of trauma to the head. Consider 24-hour follow-up head CT. 2. Moderate chronic small vessel ischemic  change and moderate generalized parenchymal volume loss. Electronically Signed: Erik Baer MD  8/31/2023 12:18 AM EDT  Workstation ID: [id number]    CT Head Without Contrast    Result Date: 8/30/2023  Impression: Impression: 1.Subtle hyperdensity in the left frontal lobe may represent calcifications, although a trace subarachnoid hemorrhage is not completely excluded. Please consider repeat CT in 4 to 6 hours to document stability. 2.Low-density fluid along the right frontal convexity, measuring 1.2 cm in thickness, is concerning for an old subdural hemorrhage versus subdural hygroma. Electronically Signed: Chemo Vargas,   8/30/2023 7:15 PM EDT  Workstation ID: [id number]    XR Chest 1 View    Result Date: 8/30/2023  Impression: Impression: Low lung volumes accentuates pulmonary vasculature and cardiomediastinal silhouette. No definite acute cardiopulmonary abnormality. Electronically Signed: Chemo Vargas DO  8/30/2023 5:50 PM EDT  Workstation ID: [id number]                 Labs Pending at Discharge:      Procedures Performed           Consults:   Consults       Date and Time Order Name Status Description    8/31/2023  9:15 AM Inpatient Neurosurgery Consult Completed     8/30/2023  8:06 PM Hospitalist (on-call MD unless specified)      8/30/2023  7:33 PM Neurosurgery (on-call MD unless specified) Completed               Discharge Details        Discharge Medications        Continue These Medications        Instructions Start Date   carbidopa-levodopa ER  MG per tablet  Commonly known as: SINEMET CR   1 tablet, Oral, 3 Times Daily      carboxymethylcellulose 0.5 % solution  Commonly known as: REFRESH PLUS   1 drop, Both Eyes, 2 Times Daily      colesevelam 625 MG tablet  Commonly known as: WELCHOL   625 mg, Oral, 2 Times Daily With Meals      dutasteride 0.5 MG capsule  Commonly known as: AVODART   0.5 mg, Oral, Daily      erythromycin 5 MG/GM ophthalmic ointment  Commonly known as: ROMYCIN   1  application , Both Eyes, Nightly      furosemide 40 MG tablet  Commonly known as: LASIX   40 mg, Oral, Daily      isosorbide mononitrate 60 MG 24 hr tablet  Commonly known as: IMDUR   60 mg, Oral, Daily      levothyroxine 75 MCG tablet  Commonly known as: SYNTHROID, LEVOTHROID   75 mcg, Oral, Daily      metoprolol succinate XL 50 MG 24 hr tablet  Commonly known as: TOPROL-XL   50 mg, Oral, Daily      nitroglycerin 0.4 MG SL tablet  Commonly known as: NITROSTAT   0.4 mg, Sublingual, Every 5 Minutes PRN, Take no more than 3 doses in 15 minutes.      Nuplazid 34 MG capsule  Generic drug: Pimavanserin Tartrate   1 capsule, Oral, Nightly      rosuvastatin 10 MG tablet  Commonly known as: CRESTOR   10 mg, Oral, Daily      tamsulosin 0.4 MG capsule 24 hr capsule  Commonly known as: FLOMAX   1 capsule, Oral, Daily               No Known Allergies      Discharge Disposition:   Rehab Facility or Unit (DC - External)    Diet:  Hospital:  Diet Order   Procedures    Diet: Regular/House Diet; Texture: Regular Texture (IDDSI 7); Fluid Consistency: Thin (IDDSI 0)         Discharge Activity:         CODE STATUS:  Code Status and Medical Interventions:   Ordered at: 08/30/23 0019     Medical Intervention Limits:    NO intubation (DNI)     Code Status (Patient has no pulse and is not breathing):    No CPR (Do Not Attempt to Resuscitate)     Medical Interventions (Patient has pulse or is breathing):    Limited Support         No future appointments.        Time spent on Discharge including face to face service:  45 minutes    This patient has been examined wearing appropriate Personal Protective Equipment and discussed with  patient and family . 09/05/23      Signature: Bradley Hewitt MD

## 2023-09-05 NOTE — THERAPY TREATMENT NOTE
Subjective: Pt agreeable to therapeutic plan of care.    Objective:     Bed mobility - SBA; increased time/effort needed to complete   Transfers - CGA/Min A with FWW; PT cuing provided for proper technique/safety  Ambulation - 40 feet x 2 attempts requiring CGA/Min A with FWW; PT cuing provided for obstacle navigation d/t decreased vision      Vitals: WNL    Pain: Pt reports discomfort of tail bone with redness of sacrum noted in medical chart  Intervention for pain: Repositioned    Education: Verbal/Tactile Cues    Assessment: Jermaine Black presents with functional mobility impairments which indicate the need for skilled intervention. Tolerating session today without incident. Pt agreeable to skilled interventions performed. Will continue to follow and progress as tolerated.     Plan/Recommendations:   High Intensity Therapy recommended post-acute care. This is recommended as therapy feels the patient would require 5-6 days per week, 2-3 hours per day. At this time, inpatient rehabilitation (acute rehab) would be the first choice and SNF would be second.. Pt requires no DME at discharge.     Pt desires Inpatient Rehabilitation placement at discharge. Pt cooperative; agreeable to therapeutic recommendations and plan of care.         Basic Mobility 6-click:  Rollin = Total, A lot = 2, A little = 3; 4 = None  Supine>Sit:   1 = Total, A lot = 2, A little = 3; 4 = None   Sit>Stand with arms:  1 = Total, A lot = 2, A little = 3; 4 = None  Bed>Chair:   1 = Total, A lot = 2, A little = 3; 4 = None  Ambulate in room:  1 = Total, A lot = 2, A little = 3; 4 = None  3-5 Steps with railin = Total, A lot = 2, A little = 3; 4 = None  Score: 21    Post-Tx Position: Up in Chair, Alarms activated, and Call light and personal items within reach  PPE: gloves

## 2023-09-06 NOTE — CASE MANAGEMENT/SOCIAL WORK
Case Management Discharge Note      Final Note: [facility] rehab               Transportation Services  W/C Jacobo: Ronak Centeno    Final Discharge Disposition Code: 62 - inpatient rehab facility

## 2023-09-19 ENCOUNTER — APPOINTMENT (OUTPATIENT)
Dept: GENERAL RADIOLOGY | Facility: HOSPITAL | Age: 88
DRG: 177 | End: 2023-09-19
Payer: MEDICARE

## 2023-09-19 ENCOUNTER — HOSPITAL ENCOUNTER (INPATIENT)
Facility: HOSPITAL | Age: 88
LOS: 7 days | Discharge: NURSING, REHAB OR LONG-TERM CARE | DRG: 177 | End: 2023-09-26
Attending: EMERGENCY MEDICINE | Admitting: STUDENT IN AN ORGANIZED HEALTH CARE EDUCATION/TRAINING PROGRAM
Payer: MEDICARE

## 2023-09-19 ENCOUNTER — APPOINTMENT (OUTPATIENT)
Dept: CT IMAGING | Facility: HOSPITAL | Age: 88
DRG: 177 | End: 2023-09-19
Payer: MEDICARE
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
## 2023-09-19 DIAGNOSIS — I50.9 ACUTE ON CHRONIC CONGESTIVE HEART FAILURE, UNSPECIFIED HEART FAILURE TYPE: ICD-10-CM

## 2023-09-19 DIAGNOSIS — R53.1 GENERALIZED WEAKNESS: Primary | ICD-10-CM

## 2023-09-19 DIAGNOSIS — R79.89 ELEVATED TROPONIN: ICD-10-CM

## 2023-09-19 DIAGNOSIS — U07.1 COVID: ICD-10-CM

## 2023-09-19 LAB
ANION GAP SERPL CALCULATED.3IONS-SCNC: 6 MMOL/L (ref 5–15)
B PARAPERT DNA SPEC QL NAA+PROBE: NOT DETECTED
B PERT DNA SPEC QL NAA+PROBE: NOT DETECTED
BACTERIA UR QL AUTO: ABNORMAL /HPF
BASOPHILS # BLD AUTO: 0 10*3/MM3 (ref 0–0.2)
BASOPHILS NFR BLD AUTO: 0.3 % (ref 0–1.5)
BILIRUB UR QL STRIP: NEGATIVE
BUN SERPL-MCNC: 19 MG/DL (ref 8–23)
BUN/CREAT SERPL: 17.9 (ref 7–25)
C PNEUM DNA NPH QL NAA+NON-PROBE: NOT DETECTED
CALCIUM SPEC-SCNC: 9.2 MG/DL (ref 8.2–9.6)
CHLORIDE SERPL-SCNC: 103 MMOL/L (ref 98–107)
CLARITY UR: CLEAR
CO2 SERPL-SCNC: 32 MMOL/L (ref 22–29)
COLOR UR: YELLOW
CREAT SERPL-MCNC: 1.06 MG/DL (ref 0.76–1.27)
CRP SERPL-MCNC: 0.56 MG/DL (ref 0–0.5)
D DIMER PPP FEU-MCNC: 3.06 MG/L (FEU) (ref 0–0.96)
DEPRECATED RDW RBC AUTO: 52.9 FL (ref 37–54)
EGFRCR SERPLBLD CKD-EPI 2021: 64.2 ML/MIN/1.73
EOSINOPHIL # BLD AUTO: 0 10*3/MM3 (ref 0–0.4)
EOSINOPHIL NFR BLD AUTO: 0.2 % (ref 0.3–6.2)
ERYTHROCYTE [DISTWIDTH] IN BLOOD BY AUTOMATED COUNT: 15.5 % (ref 12.3–15.4)
FERRITIN SERPL-MCNC: 265.3 NG/ML (ref 30–400)
FIBRINOGEN PPP-MCNC: 377 MG/DL (ref 210–450)
FLUAV SUBTYP SPEC NAA+PROBE: NOT DETECTED
FLUBV RNA ISLT QL NAA+PROBE: NOT DETECTED
GEN 5 2HR TROPONIN T REFLEX: 66 NG/L
GLUCOSE SERPL-MCNC: 114 MG/DL (ref 65–99)
GLUCOSE UR STRIP-MCNC: NEGATIVE MG/DL
HADV DNA SPEC NAA+PROBE: NOT DETECTED
HCOV 229E RNA SPEC QL NAA+PROBE: NOT DETECTED
HCOV HKU1 RNA SPEC QL NAA+PROBE: NOT DETECTED
HCOV NL63 RNA SPEC QL NAA+PROBE: NOT DETECTED
HCOV OC43 RNA SPEC QL NAA+PROBE: NOT DETECTED
HCT VFR BLD AUTO: 38.8 % (ref 37.5–51)
HGB BLD-MCNC: 12.8 G/DL (ref 13–17.7)
HGB UR QL STRIP.AUTO: ABNORMAL
HMPV RNA NPH QL NAA+NON-PROBE: NOT DETECTED
HOLD SPECIMEN: NORMAL
HPIV1 RNA ISLT QL NAA+PROBE: NOT DETECTED
HPIV2 RNA SPEC QL NAA+PROBE: NOT DETECTED
HPIV3 RNA NPH QL NAA+PROBE: NOT DETECTED
HPIV4 P GENE NPH QL NAA+PROBE: NOT DETECTED
HYALINE CASTS UR QL AUTO: ABNORMAL /LPF
KETONES UR QL STRIP: ABNORMAL
LDH SERPL-CCNC: 242 U/L (ref 135–225)
LEUKOCYTE ESTERASE UR QL STRIP.AUTO: NEGATIVE
LYMPHOCYTES # BLD AUTO: 0.2 10*3/MM3 (ref 0.7–3.1)
LYMPHOCYTES NFR BLD AUTO: 3.3 % (ref 19.6–45.3)
M PNEUMO IGG SER IA-ACNC: NOT DETECTED
MCH RBC QN AUTO: 32.6 PG (ref 26.6–33)
MCHC RBC AUTO-ENTMCNC: 33 G/DL (ref 31.5–35.7)
MCV RBC AUTO: 98.7 FL (ref 79–97)
MONOCYTES # BLD AUTO: 0.9 10*3/MM3 (ref 0.1–0.9)
MONOCYTES NFR BLD AUTO: 12.8 % (ref 5–12)
NEUTROPHILS NFR BLD AUTO: 6.1 10*3/MM3 (ref 1.7–7)
NEUTROPHILS NFR BLD AUTO: 83.4 % (ref 42.7–76)
NITRITE UR QL STRIP: NEGATIVE
NRBC BLD AUTO-RTO: 0 /100 WBC (ref 0–0.2)
NT-PROBNP SERPL-MCNC: 9356 PG/ML (ref 0–1800)
PH UR STRIP.AUTO: 6 [PH] (ref 5–8)
PLATELET # BLD AUTO: 151 10*3/MM3 (ref 140–450)
PMV BLD AUTO: 8.7 FL (ref 6–12)
POTASSIUM SERPL-SCNC: 4.3 MMOL/L (ref 3.5–5.2)
PROT UR QL STRIP: NEGATIVE
RBC # BLD AUTO: 3.93 10*6/MM3 (ref 4.14–5.8)
RBC # UR STRIP: ABNORMAL /HPF
REF LAB TEST METHOD: ABNORMAL
RHINOVIRUS RNA SPEC NAA+PROBE: NOT DETECTED
RSV RNA NPH QL NAA+NON-PROBE: NOT DETECTED
SARS-COV-2 RNA NPH QL NAA+NON-PROBE: DETECTED
SODIUM SERPL-SCNC: 141 MMOL/L (ref 136–145)
SP GR UR STRIP: 1.02 (ref 1–1.03)
SQUAMOUS #/AREA URNS HPF: ABNORMAL /HPF
TROPONIN T DELTA: -10 NG/L
TROPONIN T SERPL HS-MCNC: 116 NG/L
TROPONIN T SERPL HS-MCNC: 76 NG/L
UROBILINOGEN UR QL STRIP: ABNORMAL
WBC # UR STRIP: ABNORMAL /HPF
WBC NRBC COR # BLD: 7.3 10*3/MM3 (ref 3.4–10.8)

## 2023-09-19 PROCEDURE — 83880 ASSAY OF NATRIURETIC PEPTIDE: CPT | Performed by: EMERGENCY MEDICINE

## 2023-09-19 PROCEDURE — 83615 LACTATE (LD) (LDH) ENZYME: CPT

## 2023-09-19 PROCEDURE — 94640 AIRWAY INHALATION TREATMENT: CPT

## 2023-09-19 PROCEDURE — 80048 BASIC METABOLIC PNL TOTAL CA: CPT | Performed by: EMERGENCY MEDICINE

## 2023-09-19 PROCEDURE — 85384 FIBRINOGEN ACTIVITY: CPT

## 2023-09-19 PROCEDURE — 85379 FIBRIN DEGRADATION QUANT: CPT

## 2023-09-19 PROCEDURE — 71045 X-RAY EXAM CHEST 1 VIEW: CPT

## 2023-09-19 PROCEDURE — P9612 CATHETERIZE FOR URINE SPEC: HCPCS

## 2023-09-19 PROCEDURE — 73562 X-RAY EXAM OF KNEE 3: CPT

## 2023-09-19 PROCEDURE — 70450 CT HEAD/BRAIN W/O DYE: CPT

## 2023-09-19 PROCEDURE — 63710000001 DEXAMETHASONE PER 0.25 MG: Performed by: INTERNAL MEDICINE

## 2023-09-19 PROCEDURE — 84484 ASSAY OF TROPONIN QUANT: CPT | Performed by: EMERGENCY MEDICINE

## 2023-09-19 PROCEDURE — 92610 EVALUATE SWALLOWING FUNCTION: CPT

## 2023-09-19 PROCEDURE — 85025 COMPLETE CBC W/AUTO DIFF WBC: CPT | Performed by: EMERGENCY MEDICINE

## 2023-09-19 PROCEDURE — 84484 ASSAY OF TROPONIN QUANT: CPT

## 2023-09-19 PROCEDURE — 82728 ASSAY OF FERRITIN: CPT

## 2023-09-19 PROCEDURE — 25010000002 FUROSEMIDE PER 20 MG: Performed by: NURSE PRACTITIONER

## 2023-09-19 PROCEDURE — 81001 URINALYSIS AUTO W/SCOPE: CPT | Performed by: EMERGENCY MEDICINE

## 2023-09-19 PROCEDURE — 99285 EMERGENCY DEPT VISIT HI MDM: CPT

## 2023-09-19 PROCEDURE — 25010000002 CEFTRIAXONE PER 250 MG: Performed by: NURSE PRACTITIONER

## 2023-09-19 PROCEDURE — 25010000002 ENOXAPARIN PER 10 MG

## 2023-09-19 PROCEDURE — 25010000002 HYDRALAZINE PER 20 MG: Performed by: STUDENT IN AN ORGANIZED HEALTH CARE EDUCATION/TRAINING PROGRAM

## 2023-09-19 PROCEDURE — 93005 ELECTROCARDIOGRAM TRACING: CPT | Performed by: EMERGENCY MEDICINE

## 2023-09-19 PROCEDURE — 36415 COLL VENOUS BLD VENIPUNCTURE: CPT

## 2023-09-19 PROCEDURE — 94799 UNLISTED PULMONARY SVC/PX: CPT

## 2023-09-19 PROCEDURE — 86140 C-REACTIVE PROTEIN: CPT

## 2023-09-19 PROCEDURE — 0202U NFCT DS 22 TRGT SARS-COV-2: CPT | Performed by: EMERGENCY MEDICINE

## 2023-09-19 PROCEDURE — 25010000002 FUROSEMIDE PER 20 MG: Performed by: PHYSICIAN ASSISTANT

## 2023-09-19 RX ORDER — DEXAMETHASONE 4 MG/1
6 TABLET ORAL
Status: COMPLETED | OUTPATIENT
Start: 2023-09-19 | End: 2023-09-23

## 2023-09-19 RX ORDER — TAMSULOSIN HYDROCHLORIDE 0.4 MG/1
0.4 CAPSULE ORAL DAILY
Status: DISCONTINUED | OUTPATIENT
Start: 2023-09-20 | End: 2023-09-26 | Stop reason: HOSPADM

## 2023-09-19 RX ORDER — CARBIDOPA AND LEVODOPA 25; 100 MG/1; MG/1
1 TABLET, EXTENDED RELEASE ORAL 3 TIMES DAILY
Status: DISCONTINUED | OUTPATIENT
Start: 2023-09-19 | End: 2023-09-26 | Stop reason: HOSPADM

## 2023-09-19 RX ORDER — ALBUTEROL SULFATE 90 UG/1
2 AEROSOL, METERED RESPIRATORY (INHALATION) EVERY 6 HOURS PRN
Status: DISCONTINUED | OUTPATIENT
Start: 2023-09-19 | End: 2023-09-19

## 2023-09-19 RX ORDER — FUROSEMIDE 10 MG/ML
40 INJECTION INTRAMUSCULAR; INTRAVENOUS ONCE
Status: COMPLETED | OUTPATIENT
Start: 2023-09-19 | End: 2023-09-19

## 2023-09-19 RX ORDER — POTASSIUM CHLORIDE 750 MG/1
10 TABLET, FILM COATED, EXTENDED RELEASE ORAL DAILY
COMMUNITY
End: 2023-09-26 | Stop reason: HOSPADM

## 2023-09-19 RX ORDER — FUROSEMIDE 10 MG/ML
40 INJECTION INTRAMUSCULAR; INTRAVENOUS EVERY 12 HOURS
Status: COMPLETED | OUTPATIENT
Start: 2023-09-19 | End: 2023-09-21

## 2023-09-19 RX ORDER — IPRATROPIUM BROMIDE AND ALBUTEROL SULFATE 2.5; .5 MG/3ML; MG/3ML
3 SOLUTION RESPIRATORY (INHALATION)
Status: DISCONTINUED | OUTPATIENT
Start: 2023-09-19 | End: 2023-09-19

## 2023-09-19 RX ORDER — HYDRALAZINE HYDROCHLORIDE 20 MG/ML
10 INJECTION INTRAMUSCULAR; INTRAVENOUS EVERY 4 HOURS PRN
Status: DISCONTINUED | OUTPATIENT
Start: 2023-09-19 | End: 2023-09-26 | Stop reason: HOSPADM

## 2023-09-19 RX ORDER — ALBUTEROL SULFATE 90 UG/1
2 AEROSOL, METERED RESPIRATORY (INHALATION)
Status: DISCONTINUED | OUTPATIENT
Start: 2023-09-19 | End: 2023-09-23

## 2023-09-19 RX ORDER — METOPROLOL SUCCINATE 50 MG/1
50 TABLET, EXTENDED RELEASE ORAL DAILY
Status: DISCONTINUED | OUTPATIENT
Start: 2023-09-19 | End: 2023-09-26 | Stop reason: HOSPADM

## 2023-09-19 RX ORDER — BUDESONIDE AND FORMOTEROL FUMARATE DIHYDRATE 160; 4.5 UG/1; UG/1
2 AEROSOL RESPIRATORY (INHALATION)
Status: DISCONTINUED | OUTPATIENT
Start: 2023-09-19 | End: 2023-09-19

## 2023-09-19 RX ORDER — ISOSORBIDE MONONITRATE 60 MG/1
60 TABLET, EXTENDED RELEASE ORAL DAILY
Status: DISCONTINUED | OUTPATIENT
Start: 2023-09-19 | End: 2023-09-26 | Stop reason: HOSPADM

## 2023-09-19 RX ORDER — ENOXAPARIN SODIUM 100 MG/ML
40 INJECTION SUBCUTANEOUS EVERY 24 HOURS
Status: DISCONTINUED | OUTPATIENT
Start: 2023-09-19 | End: 2023-09-26 | Stop reason: HOSPADM

## 2023-09-19 RX ORDER — ONDANSETRON 2 MG/ML
4 INJECTION INTRAMUSCULAR; INTRAVENOUS EVERY 6 HOURS PRN
Status: DISCONTINUED | OUTPATIENT
Start: 2023-09-19 | End: 2023-09-26 | Stop reason: HOSPADM

## 2023-09-19 RX ORDER — BENZONATATE 100 MG/1
100 CAPSULE ORAL 3 TIMES DAILY PRN
Status: DISCONTINUED | OUTPATIENT
Start: 2023-09-19 | End: 2023-09-26 | Stop reason: HOSPADM

## 2023-09-19 RX ORDER — FINASTERIDE 5 MG/1
5 TABLET, FILM COATED ORAL DAILY
Status: DISCONTINUED | OUTPATIENT
Start: 2023-09-20 | End: 2023-09-26 | Stop reason: HOSPADM

## 2023-09-19 RX ORDER — METHYLPREDNISOLONE SODIUM SUCCINATE 125 MG/2ML
80 INJECTION, POWDER, LYOPHILIZED, FOR SOLUTION INTRAMUSCULAR; INTRAVENOUS DAILY
Status: DISCONTINUED | OUTPATIENT
Start: 2023-09-19 | End: 2023-09-19

## 2023-09-19 RX ORDER — LEVOTHYROXINE SODIUM 0.07 MG/1
75 TABLET ORAL
Status: DISCONTINUED | OUTPATIENT
Start: 2023-09-20 | End: 2023-09-26 | Stop reason: HOSPADM

## 2023-09-19 RX ORDER — ACETAMINOPHEN 325 MG/1
650 TABLET ORAL EVERY 6 HOURS PRN
Status: DISCONTINUED | OUTPATIENT
Start: 2023-09-19 | End: 2023-09-26 | Stop reason: HOSPADM

## 2023-09-19 RX ORDER — NITROGLYCERIN 0.4 MG/1
0.4 TABLET SUBLINGUAL
Status: DISCONTINUED | OUTPATIENT
Start: 2023-09-19 | End: 2023-09-26 | Stop reason: HOSPADM

## 2023-09-19 RX ORDER — ROSUVASTATIN CALCIUM 10 MG/1
10 TABLET, COATED ORAL DAILY
Status: DISCONTINUED | OUTPATIENT
Start: 2023-09-20 | End: 2023-09-26 | Stop reason: HOSPADM

## 2023-09-19 RX ORDER — BUDESONIDE 0.5 MG/2ML
0.5 INHALANT ORAL
Status: DISCONTINUED | OUTPATIENT
Start: 2023-09-19 | End: 2023-09-23

## 2023-09-19 RX ORDER — ERYTHROMYCIN 5 MG/G
1 OINTMENT OPHTHALMIC NIGHTLY
Status: DISCONTINUED | OUTPATIENT
Start: 2023-09-19 | End: 2023-09-26 | Stop reason: HOSPADM

## 2023-09-19 RX ADMIN — ENOXAPARIN SODIUM 40 MG: 40 INJECTION, SOLUTION SUBCUTANEOUS at 18:08

## 2023-09-19 RX ADMIN — METOPROLOL SUCCINATE 50 MG: 50 TABLET, EXTENDED RELEASE ORAL at 18:08

## 2023-09-19 RX ADMIN — DEXAMETHASONE 6 MG: 4 TABLET ORAL at 18:10

## 2023-09-19 RX ADMIN — DOXYCYCLINE 100 MG: 100 INJECTION, POWDER, LYOPHILIZED, FOR SOLUTION INTRAVENOUS at 20:52

## 2023-09-19 RX ADMIN — FUROSEMIDE 40 MG: 10 INJECTION, SOLUTION INTRAMUSCULAR; INTRAVENOUS at 18:08

## 2023-09-19 RX ADMIN — FUROSEMIDE 40 MG: 10 INJECTION, SOLUTION INTRAMUSCULAR; INTRAVENOUS at 10:30

## 2023-09-19 RX ADMIN — ACETAMINOPHEN 650 MG: 325 TABLET, FILM COATED ORAL at 19:01

## 2023-09-19 RX ADMIN — ISOSORBIDE MONONITRATE 60 MG: 60 TABLET, EXTENDED RELEASE ORAL at 18:10

## 2023-09-19 RX ADMIN — HYDRALAZINE HYDROCHLORIDE 10 MG: 20 INJECTION INTRAMUSCULAR; INTRAVENOUS at 14:08

## 2023-09-19 RX ADMIN — ALBUTEROL SULFATE 2 PUFF: 108 AEROSOL, METERED RESPIRATORY (INHALATION) at 19:43

## 2023-09-19 RX ADMIN — CEFTRIAXONE 1000 MG: 1 INJECTION, POWDER, FOR SOLUTION INTRAMUSCULAR; INTRAVENOUS at 18:08

## 2023-09-19 RX ADMIN — CARBIDOPA AND LEVODOPA 1 TABLET: 25; 100 TABLET, EXTENDED RELEASE ORAL at 20:52

## 2023-09-19 RX ADMIN — ERYTHROMYCIN 1 APPLICATION: 5 OINTMENT OPHTHALMIC at 20:52

## 2023-09-19 RX ADMIN — CARBIDOPA AND LEVODOPA 1 TABLET: 25; 100 TABLET, EXTENDED RELEASE ORAL at 18:08

## 2023-09-19 NOTE — Clinical Note
Level of Care: Telemetry [5]   Diagnosis: COVID-19 [[id number]]   Admitting Physician: GLADYS LEAL [[id number]]   Attending Physician: GLADYS LEAL [[id number]]

## 2023-09-19 NOTE — PLAN OF CARE
Goal Outcome Evaluation:  Clinical swallow eval completed this date. Pt given trials of thin liquids by straw, puree, and mechanical soft to clinically assess swallow function. All trials fed by SLP d/t oral confusion noted when attempting to self feed. Adequate labial seal w/ no anterior loss. Extended mastication w/ mechanical soft. Disorganized oral transit w/ puree. Cough observed prior to PO. No coughing or other clinical s/s of aspiration observed during PO trials. No oral pocketing/residue. D/t oral confusion and oral phase deficits, recommend pt initiate a puree and thin liquid diet. He should be upright at 90 degrees for all PO, take small bites/sips, and have feeding assistance during meals d/t oral confusion. ST will continue to follow to ensure diet tolerance and make further recs as indicated.

## 2023-09-19 NOTE — SIGNIFICANT NOTE
Was notified by Kinza Schwartz RN that patient is now requiring increased oxygen and patient was turned up to 6L of oxygen via nasal cannula.  ABG is pending.  Pulmonology consulted for COVID-19 infection with increasing oxygen requirements.    Electronically signed by Lorena Rosas PA-C, 09/19/23, 2:16 PM EDT.

## 2023-09-19 NOTE — ED PROVIDER NOTES
Subjective   History of Present Illness  89-year-old male slipped and fell in his bedroom this morning.  Patient complains primarily of bilateral knee pain left greater than right but denies any known head injury.  Patient was admitted for a small subarachnoid hemorrhage that did not require surgical intervention and discharged September 5, 2023.  Patient had a brief stay in rehab but stated has been doing well otherwise.    History provided by:  Patient    Review of Systems   Constitutional:  Positive for fatigue.   Respiratory:  Positive for cough and shortness of breath.    Musculoskeletal:         Knee injury   Skin:  Positive for wound.     History reviewed. No pertinent past medical history.    No Known Allergies    History reviewed. No pertinent surgical history.    History reviewed. No pertinent family history.    Social History     Socioeconomic History    Marital status:    Tobacco Use    Smoking status: Never    Smokeless tobacco: Never   Vaping Use    Vaping Use: Never used   Substance and Sexual Activity    Alcohol use: Never    Drug use: Never    Sexual activity: Defer           Objective   Physical Exam  Vitals and nursing note reviewed.   Constitutional:       Appearance: Normal appearance.   HENT:      Head: Normocephalic and atraumatic.      Mouth/Throat:      Mouth: Mucous membranes are moist.      Pharynx: Oropharynx is clear.   Eyes:      Pupils: Pupils are equal, round, and reactive to light.   Cardiovascular:      Rate and Rhythm: Normal rate and regular rhythm.      Pulses: Normal pulses.      Heart sounds: Normal heart sounds. No murmur heard.  Pulmonary:      Effort: Pulmonary effort is normal.      Breath sounds: Normal breath sounds.   Abdominal:      General: Bowel sounds are normal. There is no distension.      Palpations: Abdomen is soft.      Tenderness: There is no abdominal tenderness.   Musculoskeletal:      Cervical back: Normal range of motion and neck supple. No  tenderness.      Right lower leg: No edema.      Left lower leg: No edema.      Comments: Right knee abrasion, mild ecchymosis left knee, no pain with range of motion right knee, moderate pain range of motion left knee, thoracic lumbar spine nontender   Skin:     General: Skin is warm and dry.      Capillary Refill: Capillary refill takes less than 2 seconds.   Neurological:      General: No focal deficit present.      Mental Status: He is alert and oriented to person, place, and time.   Psychiatric:         Mood and Affect: Mood normal.         Behavior: Behavior normal.       ECG 12 Lead      Date/Time: 9/19/2023 6:21 PM  Performed by: Adilene Barone PA  Authorized by: James Allison DO   Interpreted by physician  Previous ECG: no previous ECG available  Rhythm: paced  Rate: normal  BPM: 72  QRS axis: normal  Conduction: conduction normal  Clinical impression: non-specific ECG             ED Course  ED Course as of 09/19/23 1823   Tue Sep 19, 2023   0714 Patient care assumed from Dr. Arias, pending lab work and further evaluation for generalized weakess [MC]   0820 HS Troponin T(!!): 76  Two weeks ago, 56 [MC]   0945 On reevaluation patient has no complaints.  He is resting with eyes closed and appears to be comfortable.  Discussed lab work and imaging results with patient at bedside and recommendation for admission for further evaluation for generalized weakness, shortness of breath.  Shortness of breath likely due to COVID.  Patient did have elevated troponin more so than 2 weeks ago, EKG did not show any evidence of STEMI at this time.  Urinalysis negative for UTI.  BNP elevated 9300 he was given IV Lasix, chest x-ray did show some element of pulmonary edema. [MC]   1034 I spoke with JAYSON Ospina regarding admission []      ED Course User Index  [MC] Adilene Barone PA    /56 (BP Location: Right arm, Patient Position: Lying)   Pulse 79   Temp (!) 100.8 °F (38.2 °C) (Oral)   Resp 25   Ht  "167.6 cm (66\")   Wt 72.6 kg (160 lb)   SpO2 97%   BMI 25.82 kg/m²   Labs Reviewed   RESPIRATORY PANEL PCR W/ COVID-19 (SARS-COV-2) KATIE/JIMENA/VIKTORIA/[facility] IN-HOUSE, NP SWAB IN UT/VTP, 3-4 HR TAT - Abnormal; Notable for the following components:       Result Value    COVID19 Detected (*)     All other components within normal limits    Narrative:     In the setting of a positive respiratory panel with a viral infection PLUS a negative procalcitonin without other underlying concern for bacterial infection, consider observing off antibiotics or discontinuation of antibiotics and continue supportive care. If the respiratory panel is positive for atypical bacterial infection (Bordetella pertussis, Chlamydophila pneumoniae, or Mycoplasma pneumoniae), consider antibiotic de-escalation to target atypical bacterial infection.   BASIC METABOLIC PANEL - Abnormal; Notable for the following components:    Glucose 114 (*)     CO2 32.0 (*)     All other components within normal limits    Narrative:     GFR Normal >60  Chronic Kidney Disease <60  Kidney Failure <15    The GFR formula is only valid for adults with stable renal function between ages 18 and 70.   URINALYSIS W/ CULTURE IF INDICATED - Abnormal; Notable for the following components:    Ketones, UA Trace (*)     Blood, UA Moderate (2+) (*)     All other components within normal limits    Narrative:     In absence of clinical symptoms, the presence of pyuria, bacteria, and/or nitrites on the urinalysis result does not correlate with infection.   SINGLE HSTROPONIN T - Abnormal; Notable for the following components:    HS Troponin T 76 (*)     All other components within normal limits    Narrative:     High Sensitive Troponin T Reference Range:  <10.0 ng/L- Negative Female for AMI  <15.0 ng/L- Negative Male for AMI  >=10 - Abnormal Female indicating possible myocardial injury.  >=15 - Abnormal Male indicating possible myocardial injury.   Clinicians would have to " utilize clinical acumen, EKG, Troponin, and serial changes to determine if it is an Acute Myocardial Infarction or myocardial injury due to an underlying chronic condition.        BNP (IN-HOUSE) - Abnormal; Notable for the following components:    proBNP 9,356.0 (*)     All other components within normal limits    Narrative:     Among patients with dyspnea, NT-proBNP is highly sensitive for the detection of acute congestive heart failure. In addition NT-proBNP of <300 pg/ml effectively rules out acute congestive heart failure with 99% negative predictive value.     CBC WITH AUTO DIFFERENTIAL - Abnormal; Notable for the following components:    RBC 3.93 (*)     Hemoglobin 12.8 (*)     MCV 98.7 (*)     RDW 15.5 (*)     Neutrophil % 83.4 (*)     Lymphocyte % 3.3 (*)     Monocyte % 12.8 (*)     Eosinophil % 0.2 (*)     Lymphocytes, Absolute 0.20 (*)     All other components within normal limits   HIGH SENSITIVITIY TROPONIN T 2HR - Abnormal; Notable for the following components:    HS Troponin T 66 (*)     Troponin T Delta -10 (*)     All other components within normal limits    Narrative:     High Sensitive Troponin T Reference Range:  <10.0 ng/L- Negative Female for AMI  <15.0 ng/L- Negative Male for AMI  >=10 - Abnormal Female indicating possible myocardial injury.  >=15 - Abnormal Male indicating possible myocardial injury.   Clinicians would have to utilize clinical acumen, EKG, Troponin, and serial changes to determine if it is an Acute Myocardial Infarction or myocardial injury due to an underlying chronic condition.        URINALYSIS, MICROSCOPIC ONLY - Abnormal; Notable for the following components:    RBC, UA Too Numerous to Count (*)     WBC, UA 0-2 (*)     All other components within normal limits   C-REACTIVE PROTEIN - Abnormal; Notable for the following components:    C-Reactive Protein 0.56 (*)     All other components within normal limits   LACTATE DEHYDROGENASE - Abnormal; Notable for the following  components:     (*)     All other components within normal limits   FERRITIN - Normal    Narrative:     Results may be falsely decreased if patient taking Biotin.     D-DIMER, QUANTITATIVE   FIBRINOGEN   TROPONIN   CBC AND DIFFERENTIAL    Narrative:     The following orders were created for panel order CBC & Differential.  Procedure                               Abnormality         Status                     ---------                               -----------         ------                     CBC Auto Differential[[id number]]        Abnormal            Final result                 Please view results for these tests on the individual orders.   EXTRA TUBES    Narrative:     The following orders were created for panel order Extra Tubes.  Procedure                               Abnormality         Status                     ---------                               -----------         ------                     Gold Top - SST[[id number]]                                   Final result                 Please view results for these tests on the individual orders.   GOLD TOP - SST     Medications   nitroglycerin (NITROSTAT) SL tablet 0.4 mg (has no administration in time range)   Pharmacy to Dose enoxaparin (LOVENOX) (has no administration in time range)   Enoxaparin Sodium (LOVENOX) syringe 40 mg (40 mg Subcutaneous Given 9/19/23 1808)   benzonatate (TESSALON) capsule 100 mg (has no administration in time range)   furosemide (LASIX) injection 40 mg (40 mg Intravenous Given 9/19/23 1808)   hydrALAZINE (APRESOLINE) injection 10 mg (10 mg Intravenous Given 9/19/23 1408)   carbidopa-levodopa ER (SINEMET CR)  MG per tablet 1 tablet (1 tablet Oral Given 9/19/23 1808)   finasteride (PROSCAR) tablet 5 mg (has no administration in time range)   erythromycin (ROMYCIN) ophthalmic ointment 1 application  (has no administration in time range)   levothyroxine (SYNTHROID, LEVOTHROID) tablet 75 mcg (has no administration in  time range)   metoprolol succinate XL (TOPROL-XL) 24 hr tablet 50 mg (50 mg Oral Given 9/19/23 1808)   rosuvastatin (CRESTOR) tablet 10 mg (has no administration in time range)   tamsulosin (FLOMAX) 24 hr capsule 0.4 mg (has no administration in time range)   isosorbide mononitrate (IMDUR) 24 hr tablet 60 mg (60 mg Oral Given 9/19/23 1810)   budesonide (PULMICORT) nebulizer solution 0.5 mg (has no administration in time range)   cefTRIAXone (ROCEPHIN) 1,000 mg in sodium chloride 0.9 % 100 mL IVPB (1,000 mg Intravenous New Bag 9/19/23 1808)   doxycycline (VIBRAMYCIN) 100 mg in sodium chloride 0.9 % 100 mL IVPB (has no administration in time range)   albuterol sulfate HFA (PROVENTIL HFA;VENTOLIN HFA;PROAIR HFA) inhaler 2 puff (has no administration in time range)   dexAMETHasone (DECADRON) tablet 6 mg (6 mg Oral Given 9/19/23 1810)   acetaminophen (TYLENOL) tablet 650 mg (has no administration in time range)   ondansetron (ZOFRAN) injection 4 mg (has no administration in time range)   furosemide (LASIX) injection 40 mg (40 mg Intravenous Given 9/19/23 1030)     XR Knee 3 View Left    Result Date: 9/19/2023  Impression: 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 Electronically Signed: Erik Baer MD  9/19/2023 6:27 AM EDT  Workstation ID: [id number]    CT Head Without Contrast    Result Date: 9/19/2023  Impression: 1.No acute intracranial abnormality. 2.Moderate chronic small vessel ischemic change. Electronically Signed: Erik Baer MD  9/19/2023 6:56 AM EDT  Workstation ID: [id number]    XR Chest 1 View    Result Date: 9/19/2023  Impression: Mixed interstitial and airspace disease throughout the right lung and at the left lung base which may relate to pulmonary edema or multifocal atypical/viral pneumonia. Electronically Signed: Farzad Moss MD  9/19/2023 7:43 AM EDT  Workstation ID: [id number]                                          Medical Decision Making  I attempted to ambulate the patient at  discharge and he didn't do well.  Patient states he normally ambulates comfortably with a walker but was very unsteady and almost fell after taking a step with my assistance.  Patient tells me he feels weak and wobbly and this is not typical for him.  Patient is agreeable to further evaluation for his weakness.  Patient does intermittently have a moderate nonproductive cough.      Patient care assumed by JAYSON Nunez pending lab work imaging and expected admission for generalized weakness.  See work-up tab for details.  Patient admitted for generalized weakness, COVID, pulmonary edema    Problems Addressed:  Acute on chronic congestive heart failure, unspecified heart failure type: acute illness or injury  COVID: acute illness or injury  Elevated troponin: acute illness or injury  Fall, initial encounter: acute illness or injury  Generalized weakness: acute illness or injury    Amount and/or Complexity of Data Reviewed  External Data Reviewed: notes.     Details: Patient Name: Jermaine Black  : 1926  MRN: [id number]     Discharge condition: Stable  Date of Admission: 2023  Discharge Diagnosis: Generalized weakness falls syncope  Date of Discharge:  23  Primary Care Physician: Provider, No Known     Presenting Problem:   Subarachnoid hemorrhage [I60.9]  Syncope [R55]  Urinary tract infection with hematuria, site unspecified [N39.0, R31.9]  Syncope, unspecified syncope type [R55]     Active and Resolved Hospital Problems:  Active Hospital Problems    Diagnosis POA   **Syncope [R55] Yes   Parkinson's disease [G20] Unknown   HTN (hypertension) [I10] Unknown   HLD (hyperlipidemia) [E78.5] Unknown   CAD (coronary artery disease) [I25.10] Unknown   CHF (congestive heart failure) [I50.9] Unknown   Dehydration [E86.0]  Labs: ordered. Decision-making details documented in ED Course.  Radiology: ordered. Decision-making details documented in ED Course.  ECG/medicine tests: ordered. Decision-making  details documented in ED Course.    Risk  Prescription drug management.  Decision regarding hospitalization.        Final diagnoses:   Generalized weakness   Fall, initial encounter   COVID   Elevated troponin   Acute on chronic congestive heart failure, unspecified heart failure type       ED Disposition  ED Disposition       ED Disposition   Decision to Admit    Condition   --    Comment   Level of Care: Telemetry [5]   Diagnosis: COVID-19 [[id number]]   Admitting Physician: GLADYS LEAL [[id number]]   Attending Physician: GLADYS LEAL [[id number]]   Isolate for COVID?: Yes [1]   Certification: I Certify That Inpatient Hospital Services Are Medically Necessary For Greater Than 2 Midnights                 No follow-up provider specified.       Medication List      No changes were made to your prescriptions during this visit.            Adilene Barone PA  09/19/23 1328

## 2023-09-19 NOTE — LETTER
[facility]  [address] [place] [address] [place] [address]  [phone]  [phone]        September 25, 2023      Patient: Jermaine Black  YOB: 1926  Date of Visit: 9/19/2023      CLINICALS FOLLOW FOR SNF PRECERT REQUEST, AUTHORIZATION ID: [id number]      Nir Mari  Case Management Associate  [facility]  [address] [place] [address], [place] [address]  P: [phone]  F: [phone]

## 2023-09-19 NOTE — CASE MANAGEMENT/SOCIAL WORK
Discharge Planning Assessment  [facility]     Patient Name: Jermaine Black  MRN: [id number]  Today's Date: 9/19/2023    Admit Date: 9/19/2023    Plan: DC PLAN: From routine home with wife. (Recent stay at [facility]) Pending progress     Discharge Needs Assessment       Row Name 09/19/23 1152       Living Environment    People in Home spouse    Current Living Arrangements home    Potentially Unsafe Housing Conditions none    Primary Care Provided by self    Able to Return to Prior Arrangements other (see comments)       Food Insecurity    Within the past 12 months, you worried that your food would run out before you got the money to buy more. Never true    Within the past 12 months, the food you bought just didn't last and you didn't have money to get more. Never true       Transition Planning    Patient/Family Anticipates Transition to home with family    Patient/Family Anticipated Services at Transition     Transportation Anticipated family or friend will provide       Discharge Needs Assessment    Equipment Currently Used at Home walker, rolling                   Discharge Plan       Row Name 09/19/23 1152       Plan    Plan DC PLAN: From routine home with wife. (Recent stay at [facility]) Pending progress    Patient/Family in Agreement with Plan yes    Plan Comments Patient is Covid+, Called POA to ask assessment questions. From routine home with wife. Recent stay at [facility], just discharged on Saturday 9/16. Independent with ADL's uses a walker from time to time. No PCP at this time, Nick Blevins is step son and is taking care of that.  Denies any transportation issues, family will provide. Pending clinical progress.                  Continued Care and Services - Admitted Since 9/19/2023    Coordination has not been started for this encounter.          Demographic Summary       Row Name 09/19/23 9537       General Information    Admission Type observation    Referral Source admission list    Reason for Consult  discharge planning    Preferred Language English       Contact Information    Permission Granted to Share Info With     Contact Information Obtained for                    Functional Status       Row Name 09/19/23 1151       Functional Status    Usual Activity Tolerance moderate    Current Activity Tolerance moderate       Assessment of Health Literacy    How often do you have someone help you read hospital materials? Sometimes    How often do you have problems learning about your medical condition because of difficulty understanding written information? Sometimes    How often do you have a problem understanding what is told to you about your medical condition? Sometimes    How confident are you filling out medical forms by yourself? Somewhat    Health Literacy Moderate       Functional Status, IADL    Medications assistive equipment and person    Meal Preparation assistive equipment and person    Housekeeping assistive equipment and person    Laundry assistive equipment and person    Shopping assistive equipment and person       Mental Status    General Appearance WDL WDL                    Lenora Mata RN

## 2023-09-19 NOTE — CONSULTS
PULMONARY CRITICAL CARE CONSULT NOTE      PATIENT IDENTIFICATION:  Name: Jermaine Black  MRN: [id number]  :  1926     Age: 89 y.o.  Sex: male        DATE OF CONSULTATION:  2023  PRIMARY CARE PHYSICIAN    Provider, No Known                  CHIEF COMPLAINT: SOB     History of Present Illness:   Jermaine Black is a 89 y.o. male with a history of COPD, CHF, Parkinson's Disease, Hypothyroidism, CAD, Hyperlipidemia, GERD, BPH, and HTN who came to the ER after a fall this morning. Patient states that his legs gave out and he fell onto his knees but did not hit his head. He further states he has had a cough for the past 3 weeks but no sputum production. In the ER patient noted with COVID-19 and Atypical pneumonia and admitted for further treatment.       Review of Systems:   Constitutional: As above    Eyes: negative   ENT/oropharynx: negative   Cardiovascular: As above    Respiratory: As above    Gastrointestinal: negative   Genitourinary: negative   Neurological: negative   Musculoskeletal: negative   Integument/breast: negative   Endocrine: negative   Allergic/Immunologic: negative     Past Medical History:  No past medical history on file.    Past Surgical History:  No past surgical history on file.     Family History:  No family history on file.     Social History:   Social History     Tobacco Use    Smoking status: Never    Smokeless tobacco: Never   Substance Use Topics    Alcohol use: Never        Allergies:  No Known Allergies    Home Meds:  Medications Prior to Admission   Medication Sig Dispense Refill Last Dose    carbidopa-levodopa ER (SINEMET CR)  MG per tablet Take 1 tablet by mouth 3 (Three) Times a Day.       carboxymethylcellulose (REFRESH PLUS) 0.5 % solution Administer 1 drop to both eyes 2 (Two) Times a Day.       colesevelam (WELCHOL) 625 MG tablet Take 1 tablet by mouth 2 (Two) Times a Day With Meals.       dutasteride (AVODART) 0.5 MG capsule Take 1 capsule by mouth Daily.   "     erythromycin (ROMYCIN) 5 MG/GM ophthalmic ointment Administer 1 application  to both eyes Every Night.       furosemide (LASIX) 40 MG tablet Take 1 tablet by mouth Daily.       isosorbide mononitrate (IMDUR) 60 MG 24 hr tablet Take 1 tablet by mouth Daily.       levothyroxine (SYNTHROID, LEVOTHROID) 75 MCG tablet Take 1 tablet by mouth Daily.       metoprolol succinate XL (TOPROL-XL) 50 MG 24 hr tablet Take 1 tablet by mouth Daily.       nitroglycerin (NITROSTAT) 0.4 MG SL tablet Place 1 tablet under the tongue Every 5 (Five) Minutes As Needed for Chest Pain. Take no more than 3 doses in 15 minutes.       Pimavanserin Tartrate (Nuplazid) 34 MG capsule Take 1 capsule by mouth Every Night.       rosuvastatin (CRESTOR) 10 MG tablet Take 1 tablet by mouth Daily.       tamsulosin (FLOMAX) 0.4 MG capsule 24 hr capsule Take 1 capsule by mouth Daily.       potassium chloride 10 MEQ CR tablet Take 1 tablet by mouth Daily.          Objective:  tMax 24 hrs: Temp (24hrs), Av.5 °F (37.5 °C), Min:99.2 °F (37.3 °C), Max:99.7 °F (37.6 °C)      Vitals Ranges:   Temp:  [99.2 °F (37.3 °C)-99.7 °F (37.6 °C)] 99.2 °F (37.3 °C)  Heart Rate:  [68-79] 79  Resp:  [20] 20  BP: (123-173)/(57-88) 173/76    Intake and Output Last 3 Shifts:   No intake/output data recorded.    Exam:  /76 (BP Location: Right arm, Patient Position: Lying)   Pulse 79   Temp 99.2 °F (37.3 °C)   Resp 20   Ht 167.6 cm (66\")   Wt 72.6 kg (160 lb)   SpO2 95%   BMI 25.82 kg/m²       23  0540   Weight: 72.6 kg (160 lb)     General Appearance: Alert     HEENT:  Normocephalic, without obvious abnormality, atraumatic. Conjunctivae/corneas clear.  Normal external ear canals. Nares normal, no drainage.  Neck:  Supple, symmetrical, trachea midline. No JVD.  Lungs /Chest wall:   Bilateral basal rhonchi, respirations unlabored, symmetrical wall movement.     Heart:  Regular rate and rhythm, systolic murmur PMI left sternal border  Abdomen: Soft, " nontender, no masses, no organomegaly.    Extremities: Trace edema, no clubbing or cyanosis        Data Review:  All labs (24hrs):   Recent Results (from the past 24 hour(s))   ECG 12 Lead Other; fatigue    Collection Time: 09/19/23  7:25 AM   Result Value Ref Range    QT Interval 413 ms    QTC Interval 453 ms   Basic Metabolic Panel    Collection Time: 09/19/23  7:29 AM    Specimen: Blood   Result Value Ref Range    Glucose 114 (H) 65 - 99 mg/dL    BUN 19 8 - 23 mg/dL    Creatinine 1.06 0.76 - 1.27 mg/dL    Sodium 141 136 - 145 mmol/L    Potassium 4.3 3.5 - 5.2 mmol/L    Chloride 103 98 - 107 mmol/L    CO2 32.0 (H) 22.0 - 29.0 mmol/L    Calcium 9.2 8.2 - 9.6 mg/dL    BUN/Creatinine Ratio 17.9 7.0 - 25.0    Anion Gap 6.0 5.0 - 15.0 mmol/L    eGFR 64.2 >60.0 mL/min/1.73   Single High Sensitivity Troponin T    Collection Time: 09/19/23  7:29 AM    Specimen: Blood   Result Value Ref Range    HS Troponin T 76 (C) <15 ng/L   BNP    Collection Time: 09/19/23  7:29 AM    Specimen: Blood   Result Value Ref Range    proBNP 9,356.0 (H) 0.0 - 1,800.0 pg/mL   CBC Auto Differential    Collection Time: 09/19/23  7:29 AM    Specimen: Blood   Result Value Ref Range    WBC 7.30 3.40 - 10.80 10*3/mm3    RBC 3.93 (L) 4.14 - 5.80 10*6/mm3    Hemoglobin 12.8 (L) 13.0 - 17.7 g/dL    Hematocrit 38.8 37.5 - 51.0 %    MCV 98.7 (H) 79.0 - 97.0 fL    MCH 32.6 26.6 - 33.0 pg    MCHC 33.0 31.5 - 35.7 g/dL    RDW 15.5 (H) 12.3 - 15.4 %    RDW-SD 52.9 37.0 - 54.0 fl    MPV 8.7 6.0 - 12.0 fL    Platelets 151 140 - 450 10*3/mm3    Neutrophil % 83.4 (H) 42.7 - 76.0 %    Lymphocyte % 3.3 (L) 19.6 - 45.3 %    Monocyte % 12.8 (H) 5.0 - 12.0 %    Eosinophil % 0.2 (L) 0.3 - 6.2 %    Basophil % 0.3 0.0 - 1.5 %    Neutrophils, Absolute 6.10 1.70 - 7.00 10*3/mm3    Lymphocytes, Absolute 0.20 (L) 0.70 - 3.10 10*3/mm3    Monocytes, Absolute 0.90 0.10 - 0.90 10*3/mm3    Eosinophils, Absolute 0.00 0.00 - 0.40 10*3/mm3    Basophils, Absolute 0.00 0.00 - 0.20  10*3/mm3    nRBC 0.0 0.0 - 0.2 /100 WBC   Gold Top - SST    Collection Time: 09/19/23  7:29 AM   Result Value Ref Range    Extra Tube Hold for add-ons.    Respiratory Panel PCR w/COVID-19(SARS-CoV-2) KATIE/JIMENA/VIKTORIA/PAD/COR/MAD/LEOLA In-House, NP Swab in UTM/VTM, 3-4 HR TAT - Swab, Nasopharynx    Collection Time: 09/19/23  7:30 AM    Specimen: Nasopharynx; Swab   Result Value Ref Range    ADENOVIRUS, PCR Not Detected Not Detected    Coronavirus 229E Not Detected Not Detected    Coronavirus HKU1 Not Detected Not Detected    Coronavirus NL63 Not Detected Not Detected    Coronavirus OC43 Not Detected Not Detected    COVID19 Detected (C) Not Detected - Ref. Range    Human Metapneumovirus Not Detected Not Detected    Human Rhinovirus/Enterovirus Not Detected Not Detected    Influenza A PCR Not Detected Not Detected    Influenza B PCR Not Detected Not Detected    Parainfluenza Virus 1 Not Detected Not Detected    Parainfluenza Virus 2 Not Detected Not Detected    Parainfluenza Virus 3 Not Detected Not Detected    Parainfluenza Virus 4 Not Detected Not Detected    RSV, PCR Not Detected Not Detected    Bordetella pertussis pcr Not Detected Not Detected    Bordetella parapertussis PCR Not Detected Not Detected    Chlamydophila pneumoniae PCR Not Detected Not Detected    Mycoplasma pneumo by PCR Not Detected Not Detected   Urinalysis With Culture If Indicated - Straight Cath    Collection Time: 09/19/23  8:09 AM    Specimen: Straight Cath; Urine   Result Value Ref Range    Color, UA Yellow Yellow, Straw    Appearance, UA Clear Clear    pH, UA 6.0 5.0 - 8.0    Specific Gravity, UA 1.020 1.005 - 1.030    Glucose, UA Negative Negative    Ketones, UA Trace (A) Negative    Bilirubin, UA Negative Negative    Blood, UA Moderate (2+) (A) Negative    Protein, UA Negative Negative    Leuk Esterase, UA Negative Negative    Nitrite, UA Negative Negative    Urobilinogen, UA 1.0 E.U./dL 0.2 - 1.0 E.U./dL   Urinalysis, Microscopic Only - Straight  Cath    Collection Time: 09/19/23  8:09 AM    Specimen: Straight Cath; Urine   Result Value Ref Range    RBC, UA Too Numerous to Count (A) None Seen /HPF    WBC, UA 0-2 (A) None Seen /HPF    Bacteria, UA None Seen None Seen /HPF    Squamous Epithelial Cells, UA 0-2 None Seen, 0-2 /HPF    Hyaline Casts, UA None Seen None Seen /LPF    Methodology Automated Microscopy    High Sensitivity Troponin T 2Hr    Collection Time: 09/19/23  9:31 AM    Specimen: Blood   Result Value Ref Range    HS Troponin T 66 (C) <15 ng/L    Troponin T Delta -10 (L) >=-4 - <+4 ng/L   C-reactive Protein    Collection Time: 09/19/23  9:31 AM    Specimen: Blood   Result Value Ref Range    C-Reactive Protein 0.56 (H) 0.00 - 0.50 mg/dL   Ferritin    Collection Time: 09/19/23  9:31 AM    Specimen: Blood   Result Value Ref Range    Ferritin 265.30 30.00 - 400.00 ng/mL   Lactate Dehydrogenase    Collection Time: 09/19/23  9:31 AM    Specimen: Blood   Result Value Ref Range     (H) 135 - 225 U/L        Imaging:  XR Knee 3 View Left    Result Date: 9/19/2023  XR KNEE 3 VW LEFT Date of Exam: 9/19/2023 6:17 AM EDT Indication: trauma Comparison: None available. 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 The prosthesis appears intact. Alignment is anatomic. No acute fracture or dislocation. Soft tissues are unremarkable.     Impression: 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 Electronically Signed: Erik Baer MD  9/19/2023 6:27 AM EDT  Workstation ID: [id number]    CT Head Without Contrast    Result Date: 9/19/2023  CT HEAD WO CONTRAST Date of Exam: 9/19/2023 6:38 AM EDT Indication: trauma. Comparison: 8/31/2023 Technique: Axial CT images were obtained of the head without contrast administration.  Coronal reconstructions were performed.  Automated exposure control and iterative reconstruction methods were used. Findings: The paranasal sinuses and mastoid air cells appear well aerated. The calvarium is intact. There  is no focal soft tissue hematoma. Orbits appear within normal limits. There are mild intracranial vascular calcifications. There is no acute intracranial hemorrhage. There is a small stable parenchymal calcification within the parasagittal posterior left frontal lobe. There is age-appropriate generalized parenchymal volume loss. There are moderate patchy foci of white matter hypoattenuation. There are no new areas of hypoattenuation in the brain. No abnormal extra-axial collections. No mass effect or midline shift.     Impression: 1.No acute intracranial abnormality. 2.Moderate chronic small vessel ischemic change. Electronically Signed: Erik Baer MD  9/19/2023 6:56 AM EDT  Workstation ID: [id number]    CT Head Without Contrast    Result Date: 8/31/2023  CT HEAD WO CONTRAST Date of Exam: 8/31/2023 6:14 PM EDT Indication: Stroke suspected (Ped 0-17y). Comparison: 8/30/2023. Technique: Axial CT images were obtained of the head without contrast administration.  Coronal reconstructions were performed.  Automated exposure control and iterative reconstruction methods were used. Findings: No large territory infarct. Unchanged hyperdensity along the left frontal lobe is essentially unchanged from prior studies most likely represents calcifications. No definite intracranial hemorrhage. No mass effect, edema or midline shift Moderate periventricular and subcortical white matter hypodensities, nonspecific but most likely represents chronic small vessel ischemic changes. Unchanged appearance of right frontal convexity low density fluid collection measuring 1.2 cm, that most likely represents an old subdural hemorrhage versus subdural hygroma. No new or acute extra-axial fluid collection. Prominent ventricular system secondary to chronic parenchymal volume loss. The visualized orbits are unremarkable. The visualized paranasal sinuses and mastoid air cells are clear. The visualized soft tissues are unremarkable. No acute  osseous abnormality.     Impression: 1.Left frontal lobe hyperdensity is essentially unchanged from prior studies. Given the lack of evolution, this most likely represents a calcification rather than an intracranial hemorrhage. No definite intracranial hemorrhage. 2.Unchanged right frontal convexity low density fluid collection measuring 1.2 cm. Electronically Signed: Chemo DO Alicia  8/31/2023 6:31 PM EDT  Workstation ID: [id number]    CT Head Without Contrast    Result Date: 8/31/2023  CT HEAD WO CONTRAST Date of Exam: 8/30/2023 11:50 PM EDT Indication: Syncope/presyncope, cerebrovascular cause suspected follow up on possible SAH from previous CT. Comparison: 8/30/2023. Technique: Axial CT images were obtained of the head without contrast administration.  Coronal reconstructions were performed.  Automated exposure control and iterative reconstruction methods were used. Findings: There is redemonstration of a small hyperdensity adjacent to the left superior frontal gyrus on image 34 measuring 3-4 mm. There is no new hyperdensity. There is moderate generalized parenchymal volume loss. There are moderate patchy areas of white matter hypoattenuation. There are intracranial vascular calcifications. There are prominent CSF spaces most pronounced in the right frontal region favored to represent a result of parenchymal volume loss. The calvarium appears intact. The orbits appear unremarkable. The paranasal sinuses and mastoid air cells appear well aerated. Superficial soft tissues appear unremarkable. There are vascular calcifications in the scalp.     Impression: 1. Stable 3-4 mm subtle hyperdensity adjacent to the left superior frontal gyrus. In the absence of trauma, this may represent a parenchymal calcification. Otherwise, this still could represent a small focus of subarachnoid hemorrhage. There do not appear to be significant external signs of trauma to the head. Consider 24-hour follow-up head CT. 2. Moderate  chronic small vessel ischemic change and moderate generalized parenchymal volume loss. Electronically Signed: Erik Baer MD  8/31/2023 12:18 AM EDT  Workstation ID: [id number]    CT Head Without Contrast    Result Date: 8/30/2023  CT HEAD WO CONTRAST Date of Exam: 8/30/2023 6:40 PM EDT Indication: syncope. Comparison: None available. Technique: Axial CT images were obtained of the head without contrast administration.  Coronal reconstructions were performed.  Automated exposure control and iterative reconstruction methods were used. Findings: No large territory infarct. Subtle hyperdensity in the left frontal lobe may represent calcifications versus a trace subarachnoid hemorrhage. No other acute intracranial hemorrhages are identified. No mass effect, edema or midline shift Moderate periventricular and subcortical white matter hypodensities, nonspecific but most likely represents chronic small vessel ischemic changes. No extra-axial fluid collection. Prominent ventricular system secondary to chronic parenchymal volume loss. Low density fluid in the right frontal convexity measuring 1.2 cm in thickness is concerning for a old subdural hemorrhage versus subdural hygroma. The visualized orbits are unremarkable. The visualized paranasal sinuses and mastoid air cells are clear. The visualized soft tissues are unremarkable. No acute osseous abnormality.     Impression: 1.Subtle hyperdensity in the left frontal lobe may represent calcifications, although a trace subarachnoid hemorrhage is not completely excluded. Please consider repeat CT in 4 to 6 hours to document stability. 2.Low-density fluid along the right frontal convexity, measuring 1.2 cm in thickness, is concerning for an old subdural hemorrhage versus subdural hygroma. Electronically Signed: Chemo Vargas DO  8/30/2023 7:15 PM EDT  Workstation ID: [id number]    XR Chest 1 View    Result Date: 9/19/2023  XR CHEST 1 VW Date of Exam: 9/19/2023 7:34 AM EDT  Indication: hypoxemia Comparison: 8/30/2023 Findings: Left-sided AICD noted. Heart size top normal, unchanged. No pneumothorax. Mixed interstitial airspace disease involving the right lung with patchy airspace disease at the left lung base. No pneumothorax. No significant pleural effusion. Osseous structures grossly intact. Dense aortic arch atherosclerotic calcifications.     Impression: Mixed interstitial and airspace disease throughout the right lung and at the left lung base which may relate to pulmonary edema or multifocal atypical/viral pneumonia. Electronically Signed: Farzad Moss MD  9/19/2023 7:43 AM EDT  Workstation ID: [id number]    XR Chest 1 View    Result Date: 8/30/2023  XR CHEST 1 VW Date of Exam: 8/30/2023 5:20 PM EDT Indication: syncope Comparison: None available. Findings: Lines: Left subclavian dual-lead pacemaker is intact. Lungs: Poor respiratory effort accentuates the pulmonary vasculature and cardiomediastinal silhouette. No definite consolidation. Pleura: No pleural effusion or pneumothorax. Cardiomediastinum: The cardiomediastinal silhouette is normal Soft Tissues: Unremarkable. Bones: No acute osseous abnormality.     Impression: Low lung volumes accentuates pulmonary vasculature and cardiomediastinal silhouette. No definite acute cardiopulmonary abnormality. Electronically Signed: Chemo Vargas DO  8/30/2023 5:50 PM EDT  Workstation ID: [id number]       Current:  budesonide-formoterol, 2 puff, Inhalation, BID - RT  carbidopa-levodopa ER, 1 tablet, Oral, TID  enoxaparin, 40 mg, Subcutaneous, Q24H  erythromycin, 1 application , Both Eyes, Nightly  [START ON 9/20/2023] finasteride, 5 mg, Oral, Daily  furosemide, 40 mg, Intravenous, Q12H  isosorbide mononitrate, 60 mg, Oral, Daily  [START ON 9/20/2023] levothyroxine, 75 mcg, Oral, Daily With Breakfast  methylPREDNISolone sodium succinate, 80 mg, Intravenous, Daily  metoprolol succinate XL, 50 mg, Oral, Daily  [START ON 9/20/2023]  rosuvastatin, 10 mg, Oral, Daily  [START ON 9/20/2023] tamsulosin, 0.4 mg, Oral, Daily        Infusion:  Pharmacy to Dose enoxaparin (LOVENOX),          PRN:    benzonatate    hydrALAZINE    nitroglycerin    Pharmacy to Dose enoxaparin (LOVENOX)    ASSESSMENT:  Acute hypoxic respiratory distress  COVID-19  Atypical pneumonia  COPD  CHF  BPH  HTN  Parkinson's disease   Hypothyroidism  GERD  CAD       PLAN:  Antibiotics  Bronchodilators  Inhaled corticosteroids  Get Echo   Watch INOS  Keep NPO till eval for swallow   Incentive spirometer  Electrolytes/ glycemic control  DVT and GI prophylaxis-Lovenox     Discussed with Dr Kun Galloway, JSUTO  9/19/2023  15:06 EDT      I personally have examined  and interviewed the patient. I have reviewed the history, data, problems, assessment and plan with our NP.  Total Critical care time in direct medical management (   ) minutes, This time specifically excludes time spent performing procedures.    Doroteo Tristan MD   9/19/2023  15:18 EDT

## 2023-09-19 NOTE — THERAPY EVALUATION
Acute Care - Speech Language Pathology   Swallow Initial Evaluation  Rai     Patient Name: Jermaine Black  : 1926  MRN: [id number]  Today's Date: 2023               Admit Date: 2023    Visit Dx:     ICD-10-CM ICD-9-CM   1. Generalized weakness  R53.1 780.79   2. Fall, initial encounter  W19.XXXA E888.9   3. COVID  U07.1 079.89   4. Elevated troponin  R77.8 790.6   5. Acute on chronic congestive heart failure, unspecified heart failure type  I50.9 428.0     Patient Active Problem List   Diagnosis    Syncope    Parkinson's disease    HTN (hypertension)    HLD (hyperlipidemia)    CAD (coronary artery disease)    CHF (congestive heart failure)    Dehydration    COVID-19 virus detected    COVID-19     No past medical history on file.  No past surgical history on file.    SLP Recommendation and Plan  SLP Swallowing Diagnosis: oral dysphagia, R/O pharyngeal dysphagia (23)  SLP Diet Recommendation: puree, thin liquids (23 1500)  Recommended Precautions and Strategies: upright posture during/after eating, small bites of food and sips of liquid, general aspiration precautions, fatigue precautions, assist with feeding (23)  SLP Rec. for Method of Medication Administration: meds whole, meds crushed, with puree (23 1500)     Monitor for Signs of Aspiration: notify SLP if any concerns (23)  Recommended Diagnostics: reassess via clinical swallow evaluation (23)  Swallow Criteria for Skilled Therapeutic Interventions Met: demonstrates skilled criteria (23)     Rehab Potential/Prognosis, Swallowing: good, to achieve stated therapy goals (23)  Therapy Frequency (Swallow): PRN (23)  Predicted Duration Therapy Intervention (Days): until discharge (23)  Oral Care Recommendations: Oral Care BID/PRN, Oral Care before breakfast, after meals and PRN, Denture Care (23)                                       Oral Care Recommendations: Oral Care BID/PRN, Oral Care before breakfast, after meals and PRN, Denture Care (09/19/23 1500)           SWALLOW EVALUATION (last 72 hours)       SLP Adult Swallow Evaluation       Row Name 09/19/23 1500       Rehab Evaluation    Document Type evaluation  -LF    Subjective Information no complaints  -LF    Patient Observations alert;cooperative  -LF    Patient Effort good  -LF    Symptoms Noted During/After Treatment fatigue  -LF       General Information    Patient Profile Reviewed yes  -LF    Pertinent History Of Current Problem Pt is a 89 y/o male who presents to [facility] d/t a fall. In the ER patient noted with COVID-19 and Atypical pneumonia and admitted for further treatment. CT of head negative for acute findings. ST orders placed for swallowing d/t decreased alertness upon arrival. At time of eval pt awake and alert but confused.      -LF    Current Method of Nutrition NPO  -LF    Precautions/Limitations, Hearing hearing impairment, bilaterally  -LF    Prior Level of Function-Swallowing unknown  -LF    Plans/Goals Discussed with patient  -LF       Oral Motor Structure and Function    Dentition Assessment upper dentures/partial in place;lower dentures/partial in place  -LF    Secretion Management WNL/WFL  -LF    Mucosal Quality dry  -LF       Oral Musculature and Cranial Nerve Assessment    Oral Motor General Assessment generalized oral motor weakness  -LF       General Eating/Swallowing Observations    Respiratory Support Currently in Use nasal cannula  -LF    O2 Liters 3L  -LF    Eating/Swallowing Skills fed by SLP  -LF    Positioning During Eating upright 90 degree;upright in bed  -LF    Utensils Used spoon;straw  -LF    Consistencies Trialed thin liquids;pureed;mixed consistency  -LF       Clinical Swallow Eval    Clinical Swallow Evaluation Summary Pt given trials of thin liquids by straw, puree, and mechanical soft to clinically assess swallow function. All trials fed by SLP  d/t oral confusion noted when attempting to self feed. Adequate labial seal w/ no anterior loss. Extended mastication w/ mechanical soft. Disorganized oral transit w/ puree. Cough observed prior to PO. No coughing or other clinical s/s of aspiration observed during PO trials. No oral pocketing/residue. D/t oral confusion and oral phase deficits, recommend pt initiate a puree and thin liquid diet. He should be upright at 90 degrees for all PO, take small bites/sips, and have feeding assistance during meals d/t oral confusion. ST will continue to follow to ensure diet tolerance and make further recs as indicated.  -       SLP Evaluation Clinical Impression    SLP Swallowing Diagnosis oral dysphagia;R/O pharyngeal dysphagia  -    Functional Impact risk of aspiration/pneumonia;risk of malnutrition  -    Rehab Potential/Prognosis, Swallowing good, to achieve stated therapy goals  -    Swallow Criteria for Skilled Therapeutic Interventions Met demonstrates skilled criteria  -       Recommendations    Therapy Frequency (Swallow) PRN  -    Predicted Duration Therapy Intervention (Days) until discharge  -    SLP Diet Recommendation puree;thin liquids  -    Recommended Diagnostics reassess via clinical swallow evaluation  -    Recommended Precautions and Strategies upright posture during/after eating;small bites of food and sips of liquid;general aspiration precautions;fatigue precautions;assist with feeding  -    Oral Care Recommendations Oral Care BID/PRN;Oral Care before breakfast, after meals and PRN;Denture Care  -    SLP Rec. for Method of Medication Administration meds whole;meds crushed;with puree  -    Monitor for Signs of Aspiration notify SLP if any concerns  -       Swallow Goals (SLP)    Swallow LTGs Swallow Long Term Goal (free text)  -    Swallow STGs diet tolerance goal selection (SLP)  -    Diet Tolerance Goal Selection (SLP) Swallow Short Term Goal 1;Swallow Short Term Goal 2   -LF       (LTG) Swallow    (LTG) Swallow The patient will maximize swallow function for least restrictive po diet, exhibiting no complications associated with dysphagia, adequate po intake, and demonstrating independent use of safe swallow compensations.  -LF    Time Frame (Swallow Long Term Goal) by discharge  -LF    Progress/Outcomes (Swallow Long Term Goal) new goal  -LF       (STG) Swallow 1    (STG) Swallow 1 The patient will participate in a follow up assessment to determine safety and adequacy of recommended diet, independent use of safe swallow compensations, and additional goals/recommendations to follow  -LF    Time Frame (Swallow Short Term Goal 1) 1 week  -LF    Progress/Outcomes (Swallow Short Term Goal 1) new goal  -LF       (STG) Swallow 2    (STG) Swallow 2 Patient will participate in ongoing assessment of swallow, including reevaluation clinically and/or including instrumental assessment of swallow if indicated, to further assess swallow function and return to oral nutrition  -LF    Time Frame (Swallow Short Term Goal 2) 1 week  -LF    Progress/Outcomes (Swallow Short Term Goal 2) new goal  -LF              User Key  (r) = Recorded By, (t) = Taken By, (c) = Cosigned By      Initials Name Effective Dates    LF Skylar Garcia, MELLY 06/16/21 -                     EDUCATION  The patient has been educated in the following areas:   Dysphagia (Swallowing Impairment) Oral Care/Hydration Modified Diet Instruction.                Time Calculation:                MELLY Jefferson  9/19/2023

## 2023-09-20 ENCOUNTER — APPOINTMENT (OUTPATIENT)
Dept: CARDIOLOGY | Facility: HOSPITAL | Age: 88
DRG: 177 | End: 2023-09-20
Payer: MEDICARE

## 2023-09-20 ENCOUNTER — APPOINTMENT (OUTPATIENT)
Dept: CT IMAGING | Facility: HOSPITAL | Age: 88
DRG: 177 | End: 2023-09-20
Payer: MEDICARE

## 2023-09-20 LAB
ANION GAP SERPL CALCULATED.3IONS-SCNC: 11 MMOL/L (ref 5–15)
BASOPHILS # BLD AUTO: 0 10*3/MM3 (ref 0–0.2)
BASOPHILS NFR BLD AUTO: 0.1 % (ref 0–1.5)
BH CV ECHO MEAS - AO MAX PG: 68.6 MMHG
BH CV ECHO MEAS - AO MEAN PG: 44 MMHG
BH CV ECHO MEAS - AO V2 MAX: 414 CM/SEC
BH CV ECHO MEAS - AO V2 VTI: 87.6 CM
BH CV ECHO MEAS - AVA(I,D): 0.55 CM2
BH CV ECHO MEAS - EDV(MOD-SP4): 53.9 ML
BH CV ECHO MEAS - EF(MOD-SP4): 43.8 %
BH CV ECHO MEAS - ESV(MOD-SP4): 30.3 ML
BH CV ECHO MEAS - LV DIASTOLIC VOL/BSA (35-75): 29.8 CM2
BH CV ECHO MEAS - LV MAX PG: 3.4 MMHG
BH CV ECHO MEAS - LV MEAN PG: 1 MMHG
BH CV ECHO MEAS - LV SYSTOLIC VOL/BSA (12-30): 16.7 CM2
BH CV ECHO MEAS - LV V1 MAX: 91.8 CM/SEC
BH CV ECHO MEAS - LV V1 VTI: 15.3 CM
BH CV ECHO MEAS - LVOT AREA: 3.1 CM2
BH CV ECHO MEAS - LVOT DIAM: 2 CM
BH CV ECHO MEAS - PA ACC TIME: 0.1 SEC
BH CV ECHO MEAS - PA V2 MAX: 103 CM/SEC
BH CV ECHO MEAS - RV MAX PG: 3 MMHG
BH CV ECHO MEAS - RV V1 MAX: 87 CM/SEC
BH CV ECHO MEAS - RV V1 VTI: 16.1 CM
BH CV ECHO MEAS - SI(MOD-SP4): 13 ML/M2
BH CV ECHO MEAS - SV(LVOT): 48.1 ML
BH CV ECHO MEAS - SV(MOD-SP4): 23.6 ML
BUN SERPL-MCNC: 24 MG/DL (ref 8–23)
BUN/CREAT SERPL: 23.8 (ref 7–25)
CALCIUM SPEC-SCNC: 8.5 MG/DL (ref 8.2–9.6)
CHLORIDE SERPL-SCNC: 99 MMOL/L (ref 98–107)
CO2 SERPL-SCNC: 30 MMOL/L (ref 22–29)
CREAT SERPL-MCNC: 1.01 MG/DL (ref 0.76–1.27)
DEPRECATED RDW RBC AUTO: 54.7 FL (ref 37–54)
EGFRCR SERPLBLD CKD-EPI 2021: 68.1 ML/MIN/1.73
EOSINOPHIL # BLD AUTO: 0 10*3/MM3 (ref 0–0.4)
EOSINOPHIL NFR BLD AUTO: 0 % (ref 0.3–6.2)
ERYTHROCYTE [DISTWIDTH] IN BLOOD BY AUTOMATED COUNT: 15.1 % (ref 12.3–15.4)
GLUCOSE SERPL-MCNC: 178 MG/DL (ref 65–99)
HCT VFR BLD AUTO: 37.5 % (ref 37.5–51)
HGB BLD-MCNC: 12.4 G/DL (ref 13–17.7)
LYMPHOCYTES # BLD AUTO: 0.3 10*3/MM3 (ref 0.7–3.1)
LYMPHOCYTES NFR BLD AUTO: 4.2 % (ref 19.6–45.3)
MCH RBC QN AUTO: 32.6 PG (ref 26.6–33)
MCHC RBC AUTO-ENTMCNC: 33 G/DL (ref 31.5–35.7)
MCV RBC AUTO: 98.7 FL (ref 79–97)
MONOCYTES # BLD AUTO: 0.7 10*3/MM3 (ref 0.1–0.9)
MONOCYTES NFR BLD AUTO: 10.2 % (ref 5–12)
NEUTROPHILS NFR BLD AUTO: 6 10*3/MM3 (ref 1.7–7)
NEUTROPHILS NFR BLD AUTO: 85.5 % (ref 42.7–76)
NRBC BLD AUTO-RTO: 0.1 /100 WBC (ref 0–0.2)
PLATELET # BLD AUTO: 137 10*3/MM3 (ref 140–450)
PMV BLD AUTO: 9.4 FL (ref 6–12)
POTASSIUM SERPL-SCNC: 3.5 MMOL/L (ref 3.5–5.2)
QT INTERVAL: 413 MS
QT INTERVAL: 425 MS
QTC INTERVAL: 453 MS
QTC INTERVAL: 499 MS
RBC # BLD AUTO: 3.8 10*6/MM3 (ref 4.14–5.8)
SODIUM SERPL-SCNC: 140 MMOL/L (ref 136–145)
WBC NRBC COR # BLD: 7 10*3/MM3 (ref 3.4–10.8)

## 2023-09-20 PROCEDURE — 93321 DOPPLER ECHO F-UP/LMTD STD: CPT | Performed by: INTERNAL MEDICINE

## 2023-09-20 PROCEDURE — 93308 TTE F-UP OR LMTD: CPT

## 2023-09-20 PROCEDURE — 25010000002 FUROSEMIDE PER 20 MG: Performed by: NURSE PRACTITIONER

## 2023-09-20 PROCEDURE — 94761 N-INVAS EAR/PLS OXIMETRY MLT: CPT

## 2023-09-20 PROCEDURE — 25010000002 ENOXAPARIN PER 10 MG

## 2023-09-20 PROCEDURE — 93308 TTE F-UP OR LMTD: CPT | Performed by: INTERNAL MEDICINE

## 2023-09-20 PROCEDURE — 94799 UNLISTED PULMONARY SVC/PX: CPT

## 2023-09-20 PROCEDURE — 71275 CT ANGIOGRAPHY CHEST: CPT

## 2023-09-20 PROCEDURE — 97166 OT EVAL MOD COMPLEX 45 MIN: CPT

## 2023-09-20 PROCEDURE — 63710000001 DEXAMETHASONE PER 0.25 MG: Performed by: INTERNAL MEDICINE

## 2023-09-20 PROCEDURE — 80048 BASIC METABOLIC PNL TOTAL CA: CPT | Performed by: INTERNAL MEDICINE

## 2023-09-20 PROCEDURE — 97162 PT EVAL MOD COMPLEX 30 MIN: CPT

## 2023-09-20 PROCEDURE — 25010000002 CEFTRIAXONE PER 250 MG: Performed by: NURSE PRACTITIONER

## 2023-09-20 PROCEDURE — 93005 ELECTROCARDIOGRAM TRACING: CPT | Performed by: INTERNAL MEDICINE

## 2023-09-20 PROCEDURE — 93325 DOPPLER ECHO COLOR FLOW MAPG: CPT | Performed by: INTERNAL MEDICINE

## 2023-09-20 PROCEDURE — 93325 DOPPLER ECHO COLOR FLOW MAPG: CPT

## 2023-09-20 PROCEDURE — 93321 DOPPLER ECHO F-UP/LMTD STD: CPT

## 2023-09-20 PROCEDURE — 25510000001 IOPAMIDOL PER 1 ML: Performed by: INTERNAL MEDICINE

## 2023-09-20 PROCEDURE — 93010 ELECTROCARDIOGRAM REPORT: CPT | Performed by: INTERNAL MEDICINE

## 2023-09-20 PROCEDURE — 85025 COMPLETE CBC W/AUTO DIFF WBC: CPT | Performed by: INTERNAL MEDICINE

## 2023-09-20 RX ORDER — FUROSEMIDE 40 MG/1
40 TABLET ORAL DAILY
Status: DISCONTINUED | OUTPATIENT
Start: 2023-09-21 | End: 2023-09-26 | Stop reason: HOSPADM

## 2023-09-20 RX ADMIN — ALBUTEROL SULFATE 2 PUFF: 108 AEROSOL, METERED RESPIRATORY (INHALATION) at 08:19

## 2023-09-20 RX ADMIN — ISOSORBIDE MONONITRATE 60 MG: 60 TABLET, EXTENDED RELEASE ORAL at 08:15

## 2023-09-20 RX ADMIN — ERYTHROMYCIN 1 APPLICATION: 5 OINTMENT OPHTHALMIC at 21:06

## 2023-09-20 RX ADMIN — CARBIDOPA AND LEVODOPA 1 TABLET: 25; 100 TABLET, EXTENDED RELEASE ORAL at 08:14

## 2023-09-20 RX ADMIN — ROSUVASTATIN 10 MG: 10 TABLET, FILM COATED ORAL at 08:14

## 2023-09-20 RX ADMIN — CARBIDOPA AND LEVODOPA 1 TABLET: 25; 100 TABLET, EXTENDED RELEASE ORAL at 17:10

## 2023-09-20 RX ADMIN — FUROSEMIDE 40 MG: 10 INJECTION, SOLUTION INTRAMUSCULAR; INTRAVENOUS at 17:10

## 2023-09-20 RX ADMIN — IOPAMIDOL 100 ML: 755 INJECTION, SOLUTION INTRAVENOUS at 12:18

## 2023-09-20 RX ADMIN — TAMSULOSIN HYDROCHLORIDE 0.4 MG: 0.4 CAPSULE ORAL at 08:15

## 2023-09-20 RX ADMIN — LEVOTHYROXINE SODIUM 75 MCG: 0.07 TABLET ORAL at 05:58

## 2023-09-20 RX ADMIN — FUROSEMIDE 40 MG: 10 INJECTION, SOLUTION INTRAMUSCULAR; INTRAVENOUS at 05:58

## 2023-09-20 RX ADMIN — CARBIDOPA AND LEVODOPA 1 TABLET: 25; 100 TABLET, EXTENDED RELEASE ORAL at 21:05

## 2023-09-20 RX ADMIN — DEXAMETHASONE 6 MG: 4 TABLET ORAL at 08:14

## 2023-09-20 RX ADMIN — Medication 5000 UNITS: at 12:50

## 2023-09-20 RX ADMIN — ENOXAPARIN SODIUM 40 MG: 40 INJECTION, SOLUTION SUBCUTANEOUS at 17:10

## 2023-09-20 RX ADMIN — ALBUTEROL SULFATE 2 PUFF: 108 AEROSOL, METERED RESPIRATORY (INHALATION) at 18:45

## 2023-09-20 RX ADMIN — ALBUTEROL SULFATE 2 PUFF: 108 AEROSOL, METERED RESPIRATORY (INHALATION) at 14:59

## 2023-09-20 RX ADMIN — ALBUTEROL SULFATE 2 PUFF: 108 AEROSOL, METERED RESPIRATORY (INHALATION) at 10:40

## 2023-09-20 RX ADMIN — CEFTRIAXONE 1000 MG: 1 INJECTION, POWDER, FOR SOLUTION INTRAMUSCULAR; INTRAVENOUS at 17:10

## 2023-09-20 RX ADMIN — FINASTERIDE 5 MG: 5 TABLET, FILM COATED ORAL at 08:15

## 2023-09-20 RX ADMIN — METOPROLOL SUCCINATE 50 MG: 50 TABLET, EXTENDED RELEASE ORAL at 08:15

## 2023-09-20 RX ADMIN — DOXYCYCLINE 100 MG: 100 INJECTION, POWDER, LYOPHILIZED, FOR SOLUTION INTRAVENOUS at 08:12

## 2023-09-20 NOTE — PLAN OF CARE
"Goal Outcome Evaluation:  Plan of Care Reviewed With: patient        Progress: no change  Outcome Evaluation: Pt is a 89 y/o M who presented to [facility] after fall at home w/ reports of \"knees giving out\" (denies striking head) and w/ reports of ~3 week cough. Pt found to have Covid-19 w/ atypical PNA. Pt was recently admitted ~1 month ago for syncopal fall w/ [facility] and had gone to [facility] for rehab, had been home only 3-4 days. Pt also w/ hx of COPD, CHF, Parkinson's disease, CAD, HLD, GERD, BPH, and HTN. At baseline, pt lives w/ wife in [facility] w/ 2 NEREYDA (B HR) and was MOD I w/ [facility] (has RW but reports he wasn't using, also has w/c), though reports 5+ falls over the past several months (has [facility]HA 2 days/week to assist w/ housework). During eval, pt requires CGA for supine to sit transfer, MIN A for sit to stand transfer w/ posterior LOB requiring return to sitting EOB, and required MOD A for stand pivot sit transfer from EOB to recliner. Pt w/ Sp02 >95% on 2L throughout session. Pt far from baseline and at high risk for falls, recommending SNF at d/c and plan to see 5x/week at [facility] for progression of mobility. PPE: gloves, N95, gown, eye protection      Anticipated Discharge Disposition (PT): skilled nursing facility  "

## 2023-09-20 NOTE — PAYOR COMM NOTE
"This is a clinical update for Jermaine Black   Reference/Auth # [id number]    INPATIENT AUTHORIZATION PENDING:     Please call or fax determination to contact below.   Thank you.    Dottie Harper, RN, BSN  Utilization Review Nurse  [facility]  Direct & confidential phone # [phone]  Fax # [phone]      Jermaine Black (89 y.o. Male)       Date of Birth   11/30/1926    Social Security Number       Address   [address]  [place] [address]    Home Phone   [phone]    MRN   [id number]       Catholic   None    Marital Status                               Admission Date   9/19/23    Admission Type   Emergency    Admitting Provider   Rolan Staley MD    Attending Provider   James Allison DO    Department, Room/Bed   [facility] 2D, 252/1       Discharge Date       Discharge Disposition       Discharge Destination                                 Attending Provider: James Allison DO    Allergies: No Known Allergies    Isolation: Enh Drop/Con   Infection: COVID (confirmed) (09/19/23)   Code Status: No CPR    Ht: 167.6 cm (66\")   Wt: 71.9 kg (158 lb 8.2 oz)    Admission Cmt: None   Principal Problem: COVID-19 [U07.1]                   Active Insurance as of 9/19/2023       Primary Coverage       Payor Plan Insurance Group Employer/Plan Group    AETNA MEDICARE REPLACEMENT AETNA MEDICARE REPLACEMENT [id number]       Payor Plan Address Payor Plan Phone Number Payor Plan Fax Number Effective Dates    [address] [phone]  1/1/2022 - None Entered    [facility] [address]         Subscriber Name Subscriber Birth Date Member ID       JERMAINE BLACK 11/30/1926 [id number]                     Emergency Contacts        (Rel.) Home Phone Work Phone Mobile Phone    Wayne-Rachel ValenzuelaJUSTIN (Daughter) -- -- [phone]    Gerri Walker (Daughter) -- -- [phone]    Skylar Rutledge (Daughter) [phone] -- --                 History & Physical " "       Lorena Rosas PA-C at 23 1103       Attestation signed by Rolan Staley MD at 23 0218    I have reviewed this documentation and agree.                      [facility] Medicine Services  History & Physical    Patient Name: Jermaine Black  : 1926  MRN: [id number]  Primary Care Physician:  Provider, No Known  Date of admission: 2023  Date and Time of Service: 23 at 1103    Subjective      Chief Complaint: fall    History of Present Illness: Jermaine Black is a 89 y.o. male who presented to [facility] on 2023 complaining of fall this morning onto his bilateral knees. Patient states his \"legs gave out\" and he fell onto his knees but denies hitting his head or loss of consciousness. He denies any dizziness.  He states he has been experiencing pain in his bilateral knees since this fall but states it has improved to a \"minor pain\", he states the pain is localized to the kneecaps without radiation.  He denies any numbness or tingling in his bilateral lower extremities.  Patient states he has had a cough for the past 3 weeks but denies any sputum production.  He denies any nausea, vomiting, diarrhea, constipation, abdominal pain, shortness of air, chest pain, headache, dizziness, syncope, appetite changes, fevers or chills.  Patient states he does not drink alcohol, does not use per tobacco products and does not use illicit drugs.  Patient is oriented to person and time but not place.    In the ED,  All vitals stable on admission except /84. All labs unremarkable except high-sensitivity troponin 76 which trended down to 66, proBNP 9356, CO2 32, glucose 114, hemoglobin 12.8, respiratory panel positive for COVID-19. Patient received Lasix in ED. Hospitalist was contacted to admit patient for further care and management.     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 Electronically " Signed: Erik Baer MD  9/19/2023 6:27 AM EDT  Workstation ID: [id number]    CT Head Without Contrast    Result Date: 9/19/2023  Impression: 1.No acute intracranial abnormality. 2.Moderate chronic small vessel ischemic change. Electronically Signed: Erik Baer MD  9/19/2023 6:56 AM EDT  Workstation ID: [id number]    XR Chest 1 View    Result Date: 9/19/2023  Impression: Mixed interstitial and airspace disease throughout the right lung and at the left lung base which may relate to pulmonary edema or multifocal atypical/viral pneumonia. Electronically Signed: Farzad Moss MD  9/19/2023 7:43 AM EDT  Workstation ID: [id number]     ECG 12 Lead Other; fatigue   Preliminary Result   HEART RATE= 72  bpm   RR Interval= 832  ms   AR Interval= 177  ms   P Horizontal Axis= -59  deg   P Front Axis=   deg   QRSD Interval= 89  ms   QT Interval= 413  ms   QTcB= 453  ms   QRS Axis= -18  deg   T Wave Axis= 41  deg   - ABNORMAL ECG -   Atrial-paced complexes   Inferior infarct, old   When compared with ECG of 30-Aug-2023 16:51:03,   Significant change in rhythm   Significant axis, voltage or hypertrophy change   Electronically Signed By:    Date and Time of Study: 2023-09-19 07:25:12            ROS 12 point ROS reviewed and negative except as mentioned above.      Personal History     No past medical history on file.    No past surgical history on file.    Family History: family history is not on file. Otherwise pertinent FHx was reviewed and not pertinent to current issue.    Social History:  reports that he has never smoked. He has never used smokeless tobacco. He reports that he does not drink alcohol and does not use drugs.    Home Medications:  Prior to Admission Medications       Prescriptions Last Dose Informant Patient Reported? Taking?    carbidopa-levodopa ER (SINEMET CR)  MG per tablet   Yes No    Take 1 tablet by mouth 3 (Three) Times a Day.    carboxymethylcellulose (REFRESH PLUS) 0.5 % solution   Yes No     Administer 1 drop to both eyes 2 (Two) Times a Day.    colesevelam (WELCHOL) 625 MG tablet   Yes No    Take 1 tablet by mouth 2 (Two) Times a Day With Meals.    dutasteride (AVODART) 0.5 MG capsule   Yes No    Take 1 capsule by mouth Daily.    erythromycin (ROMYCIN) 5 MG/GM ophthalmic ointment   Yes No    Administer 1 application  to both eyes Every Night.    furosemide (LASIX) 40 MG tablet   Yes No    Take 1 tablet by mouth Daily.    isosorbide mononitrate (IMDUR) 60 MG 24 hr tablet   Yes No    Take 1 tablet by mouth Daily.    levothyroxine (SYNTHROID, LEVOTHROID) 75 MCG tablet   Yes No    Take 1 tablet by mouth Daily.    metoprolol succinate XL (TOPROL-XL) 50 MG 24 hr tablet   Yes No    Take 1 tablet by mouth Daily.    nitroglycerin (NITROSTAT) 0.4 MG SL tablet   Yes No    Place 1 tablet under the tongue Every 5 (Five) Minutes As Needed for Chest Pain. Take no more than 3 doses in 15 minutes.    Pimavanserin Tartrate (Nuplazid) 34 MG capsule   Yes No    Take 1 capsule by mouth Every Night.    rosuvastatin (CRESTOR) 10 MG tablet   Yes No    Take 1 tablet by mouth Daily.    tamsulosin (FLOMAX) 0.4 MG capsule 24 hr capsule   Yes No    Take 1 capsule by mouth Daily.              Allergies:  No Known Allergies    Objective      Vitals:   Temp:  [99.7 °F (37.6 °C)] 99.7 °F (37.6 °C)  Heart Rate:  [68-79] 79  Resp:  [20] 20  BP: (123-172)/(57-88) 172/88  Flow (L/min):  [1-4] 2    Physical Exam  Vitals and nursing note reviewed.   HENT:      Head: Normocephalic.   Eyes:      Extraocular Movements: Extraocular movements intact.      Pupils: Pupils are equal, round, and reactive to light.   Cardiovascular:      Rate and Rhythm: Normal rate and regular rhythm.   Pulmonary:      Effort: Pulmonary effort is normal.      Breath sounds: Normal breath sounds. Examination of the right-lower field reveals decreased breath sounds. Examination of the left-lower field reveals decreased breath sounds.      Comments: Patient on 2L of  oxygen via nasal cannula  Abdominal:      General: Bowel sounds are normal.      Palpations: Abdomen is soft.      Tenderness: There is no abdominal tenderness.   Musculoskeletal:         General: Normal range of motion.      Comments: Mildly tender to palpation over left knee   Skin:     General: Skin is warm and dry.      Comments: Small abrasion approximately 0.5 inch on right patella  Small ecchymosis over left knee   Neurological:      Mental Status: He is alert. He is disoriented.      Motor: Motor function is intact.   Psychiatric:         Mood and Affect: Mood normal.        Result Review    Result Review:  I have personally reviewed the results from the time of this admission to 9/19/2023 13:39 EDT and agree with these findings:  [x]  Laboratory  [x]  Microbiology  [x]  Radiology  [x]  EKG/Telemetry   []  Cardiology/Vascular   []  Pathology  []  Old records  []  Other:  Most notable findings include:     CBC:      Lab 09/19/23  0729   WBC 7.30   HEMOGLOBIN 12.8*   HEMATOCRIT 38.8   PLATELETS 151   NEUTROS ABS 6.10   LYMPHS ABS 0.20*   MONOS ABS 0.90   EOS ABS 0.00   MCV 98.7*     CMP:        Lab 09/19/23  0729 09/15/23  0500   SODIUM 141 141   POTASSIUM 4.3 4.3   CHLORIDE 103 106   CO2 32.0* 28.0   ANION GAP 6.0 7.0   BUN 19 18   CREATININE 1.06 0.87   EGFR 64.2 79.0   GLUCOSE 114* 89   CALCIUM 9.2 8.6         Assessment & Plan        Active Hospital Problems:  Active Hospital Problems    Diagnosis     **COVID-19     COVID-19 virus detected      Plan:     Weakness  Fall  Bilateral knee pain, left greater than right, improving  Parkinson's disease  Patient denies hitting his head  CT head radiology report states no acute intracranial abnormality, moderate chronic small vessel ischemic change  PT OT consulted  X-ray of left knee radiology report reveals total left knee prosthesis without complicating features  Tylenol ordered for pain as needed  Continue home medications for parkinson's disease  Weakness  likely related to Covid-19 infection    COVID-19 infection  Respiratory panel positive for COVID-19  Patient currently on 2 L of oxygen via nasal cannula with SPO2 of 95%  C-reactive protein, D-dimer, fibrinogen, ferritin, lactate dehydrogenase labs ordered  Tessalon capsule ordered for cough  Symbicort ordered twice daily, per discussion with attending physician  Solu-Medrol 80 mg daily for 2 days  ABG ordered  Per RN report, patient is coughing significantly after drinking water. We will make patient n.p.o. for now given coughing with water drinking and will consult SLP for further evaluation    Congestive heart failure, in acute exacerbation  proBNP 9356  Chest x-ray radiology report shows mixed interstitial and airspace disease throughout the right lung and at the left lung base which may relate to pulmonary edema or multifocal atypical/viral pneumonia  Lasix given in ED  Daily weights ordered  Strict intake and output ordered  Heart failure pathway initiated  Lasix 40 mg ordered twice daily for 2 days, hold home oral Lasix    Elevated troponin  Presence of pacemaker  High-sensitivity troponin 76 which trended down to 66  EKG shows atrial paced complexes, inferior infarct, old  Patient denies any chest pain  We will obtain additional troponin  Continue to monitor  Continuous cardiac monitoring ordered    History of CAD  Essential hypertension  /84  Continue to monitor BP  Continue home medications   As needed hydralazine ordered for SBP greater than 160     Hypothyroidism  Continue home medications     BPH  Continue home medications    TOM  Patient states he has sleep apnea but is non-compliant with CPAP use    DVT prophylaxis:  Medical DVT prophylaxis orders are present.    CODE STATUS:    Code Status (Patient has no pulse and is not breathing): CPR (Attempt to Resuscitate)  Medical Interventions (Patient has pulse or is breathing): Full Support    Admission Status:  I believe this patient meets inpatient  status.    I discussed the patient's findings and my recommendations with patient and attending physician Dr. Staley .    This patient has been examined wearing appropriate Personal Protective Equipment and discussed with  attending physician Dr. Staley . 09/19/23      Signature: Electronically signed by Lorena Rosas PA-C, 09/19/23, 13:39 EDT.  [facility]    Electronically signed by Rolan Staley MD at 09/20/23 0218          Emergency Department Notes        Juanita Hale at 09/19/23 0712          EKG requested       Electronically signed by Juanita Hale at 09/19/23 0712       Adilene Barone PA at 09/19/23 0646        Procedure Orders    1. ECG 12 Lead [[id number]] ordered by Adilene Barone PA              Attestation signed by Claudio Arias MD at 09/19/23 8603        SHARED APC FACE TO FACE: This visit was performed by both the physician and an APC. I personally evaluated and examined the patient. I performed the entirety of the physical exam and I documented this exam in this attestation or in accompanying documentation.    Claudio Arias MD 9/19/2023 22:35 EDT                         Subjective   History of Present Illness  89-year-old male slipped and fell in his bedroom this morning.  Patient complains primarily of bilateral knee pain left greater than right but denies any known head injury.  Patient was admitted for a small subarachnoid hemorrhage that did not require surgical intervention and discharged September 5, 2023.  Patient had a brief stay in rehab but stated has been doing well otherwise.    History provided by:  Patient    Review of Systems   Constitutional:  Positive for fatigue.   Respiratory:  Positive for cough and shortness of breath.    Musculoskeletal:         Knee injury   Skin:  Positive for wound.     History reviewed. No pertinent past medical history.    No Known Allergies    History reviewed. No pertinent surgical history.    History reviewed. No pertinent  family history.    Social History     Socioeconomic History    Marital status:    Tobacco Use    Smoking status: Never    Smokeless tobacco: Never   Vaping Use    Vaping Use: Never used   Substance and Sexual Activity    Alcohol use: Never    Drug use: Never    Sexual activity: Defer           Objective   Physical Exam  Vitals and nursing note reviewed.   Constitutional:       Appearance: Normal appearance.   HENT:      Head: Normocephalic and atraumatic.      Mouth/Throat:      Mouth: Mucous membranes are moist.      Pharynx: Oropharynx is clear.   Eyes:      Pupils: Pupils are equal, round, and reactive to light.   Cardiovascular:      Rate and Rhythm: Normal rate and regular rhythm.      Pulses: Normal pulses.      Heart sounds: Normal heart sounds. No murmur heard.  Pulmonary:      Effort: Pulmonary effort is normal.      Breath sounds: Normal breath sounds.   Abdominal:      General: Bowel sounds are normal. There is no distension.      Palpations: Abdomen is soft.      Tenderness: There is no abdominal tenderness.   Musculoskeletal:      Cervical back: Normal range of motion and neck supple. No tenderness.      Right lower leg: No edema.      Left lower leg: No edema.      Comments: Right knee abrasion, mild ecchymosis left knee, no pain with range of motion right knee, moderate pain range of motion left knee, thoracic lumbar spine nontender   Skin:     General: Skin is warm and dry.      Capillary Refill: Capillary refill takes less than 2 seconds.   Neurological:      General: No focal deficit present.      Mental Status: He is alert and oriented to person, place, and time.   Psychiatric:         Mood and Affect: Mood normal.         Behavior: Behavior normal.       ECG 12 Lead      Date/Time: 9/19/2023 6:21 PM  Performed by: Adilene Barone PA  Authorized by: James Allison DO   Interpreted by physician  Previous ECG: no previous ECG available  Rhythm: paced  Rate: normal  BPM: 72  QRS axis:  "normal  Conduction: conduction normal  Clinical impression: non-specific ECG            ED Course  ED Course as of 09/19/23 1823   Tue Sep 19, 2023   8978 Patient care assumed from Dr. Arias, pending lab work and further evaluation for generalized weakess []   0820 HS Troponin T(!!): 76  Two weeks ago, 56 []   0945 On reevaluation patient has no complaints.  He is resting with eyes closed and appears to be comfortable.  Discussed lab work and imaging results with patient at bedside and recommendation for admission for further evaluation for generalized weakness, shortness of breath.  Shortness of breath likely due to COVID.  Patient did have elevated troponin more so than 2 weeks ago, EKG did not show any evidence of STEMI at this time.  Urinalysis negative for UTI.  BNP elevated 9300 he was given IV Lasix, chest x-ray did show some element of pulmonary edema. []   1034 I spoke with JAYSON Ospina regarding admission []      ED Course User Index  [] Adilene Barone PA    /56 (BP Location: Right arm, Patient Position: Lying)   Pulse 79   Temp (!) 100.8 °F (38.2 °C) (Oral)   Resp 25   Ht 167.6 cm (66\")   Wt 72.6 kg (160 lb)   SpO2 97%   BMI 25.82 kg/m²   Labs Reviewed   RESPIRATORY PANEL PCR W/ COVID-19 (SARS-COV-2) KATIE/JIMENA/VIKTORIA/PAD/COR/MAD/LEOLA IN-HOUSE, NP SWAB IN UTM/VTP, 3-4 HR TAT - Abnormal; Notable for the following components:       Result Value    COVID19 Detected (*)     All other components within normal limits    Narrative:     In the setting of a positive respiratory panel with a viral infection PLUS a negative procalcitonin without other underlying concern for bacterial infection, consider observing off antibiotics or discontinuation of antibiotics and continue supportive care. If the respiratory panel is positive for atypical bacterial infection (Bordetella pertussis, Chlamydophila pneumoniae, or Mycoplasma pneumoniae), consider antibiotic de-escalation to target atypical " bacterial infection.   BASIC METABOLIC PANEL - Abnormal; Notable for the following components:    Glucose 114 (*)     CO2 32.0 (*)     All other components within normal limits    Narrative:     GFR Normal >60  Chronic Kidney Disease <60  Kidney Failure <15    The GFR formula is only valid for adults with stable renal function between ages 18 and 70.   URINALYSIS W/ CULTURE IF INDICATED - Abnormal; Notable for the following components:    Ketones, UA Trace (*)     Blood, UA Moderate (2+) (*)     All other components within normal limits    Narrative:     In absence of clinical symptoms, the presence of pyuria, bacteria, and/or nitrites on the urinalysis result does not correlate with infection.   SINGLE HSTROPONIN T - Abnormal; Notable for the following components:    HS Troponin T 76 (*)     All other components within normal limits    Narrative:     High Sensitive Troponin T Reference Range:  <10.0 ng/L- Negative Female for AMI  <15.0 ng/L- Negative Male for AMI  >=10 - Abnormal Female indicating possible myocardial injury.  >=15 - Abnormal Male indicating possible myocardial injury.   Clinicians would have to utilize clinical acumen, EKG, Troponin, and serial changes to determine if it is an Acute Myocardial Infarction or myocardial injury due to an underlying chronic condition.        BNP (IN-HOUSE) - Abnormal; Notable for the following components:    proBNP 9,356.0 (*)     All other components within normal limits    Narrative:     Among patients with dyspnea, NT-proBNP is highly sensitive for the detection of acute congestive heart failure. In addition NT-proBNP of <300 pg/ml effectively rules out acute congestive heart failure with 99% negative predictive value.     CBC WITH AUTO DIFFERENTIAL - Abnormal; Notable for the following components:    RBC 3.93 (*)     Hemoglobin 12.8 (*)     MCV 98.7 (*)     RDW 15.5 (*)     Neutrophil % 83.4 (*)     Lymphocyte % 3.3 (*)     Monocyte % 12.8 (*)     Eosinophil % 0.2  (*)     Lymphocytes, Absolute 0.20 (*)     All other components within normal limits   HIGH SENSITIVITIY TROPONIN T 2HR - Abnormal; Notable for the following components:    HS Troponin T 66 (*)     Troponin T Delta -10 (*)     All other components within normal limits    Narrative:     High Sensitive Troponin T Reference Range:  <10.0 ng/L- Negative Female for AMI  <15.0 ng/L- Negative Male for AMI  >=10 - Abnormal Female indicating possible myocardial injury.  >=15 - Abnormal Male indicating possible myocardial injury.   Clinicians would have to utilize clinical acumen, EKG, Troponin, and serial changes to determine if it is an Acute Myocardial Infarction or myocardial injury due to an underlying chronic condition.        URINALYSIS, MICROSCOPIC ONLY - Abnormal; Notable for the following components:    RBC, UA Too Numerous to Count (*)     WBC, UA 0-2 (*)     All other components within normal limits   C-REACTIVE PROTEIN - Abnormal; Notable for the following components:    C-Reactive Protein 0.56 (*)     All other components within normal limits   LACTATE DEHYDROGENASE - Abnormal; Notable for the following components:     (*)     All other components within normal limits   FERRITIN - Normal    Narrative:     Results may be falsely decreased if patient taking Biotin.     D-DIMER, QUANTITATIVE   FIBRINOGEN   TROPONIN   CBC AND DIFFERENTIAL    Narrative:     The following orders were created for panel order CBC & Differential.  Procedure                               Abnormality         Status                     ---------                               -----------         ------                     CBC Auto Differential[[id number]]        Abnormal            Final result                 Please view results for these tests on the individual orders.   EXTRA TUBES    Narrative:     The following orders were created for panel order Extra Tubes.  Procedure                               Abnormality         Status                      ---------                               -----------         ------                     Gold Top - SST[[id number]]                                   Final result                 Please view results for these tests on the individual orders.   GOLD TOP - SST     Medications   nitroglycerin (NITROSTAT) SL tablet 0.4 mg (has no administration in time range)   Pharmacy to Dose enoxaparin (LOVENOX) (has no administration in time range)   Enoxaparin Sodium (LOVENOX) syringe 40 mg (40 mg Subcutaneous Given 9/19/23 1808)   benzonatate (TESSALON) capsule 100 mg (has no administration in time range)   furosemide (LASIX) injection 40 mg (40 mg Intravenous Given 9/19/23 1808)   hydrALAZINE (APRESOLINE) injection 10 mg (10 mg Intravenous Given 9/19/23 1408)   carbidopa-levodopa ER (SINEMET CR)  MG per tablet 1 tablet (1 tablet Oral Given 9/19/23 1808)   finasteride (PROSCAR) tablet 5 mg (has no administration in time range)   erythromycin (ROMYCIN) ophthalmic ointment 1 application  (has no administration in time range)   levothyroxine (SYNTHROID, LEVOTHROID) tablet 75 mcg (has no administration in time range)   metoprolol succinate XL (TOPROL-XL) 24 hr tablet 50 mg (50 mg Oral Given 9/19/23 1808)   rosuvastatin (CRESTOR) tablet 10 mg (has no administration in time range)   tamsulosin (FLOMAX) 24 hr capsule 0.4 mg (has no administration in time range)   isosorbide mononitrate (IMDUR) 24 hr tablet 60 mg (60 mg Oral Given 9/19/23 1810)   budesonide (PULMICORT) nebulizer solution 0.5 mg (has no administration in time range)   cefTRIAXone (ROCEPHIN) 1,000 mg in sodium chloride 0.9 % 100 mL IVPB (1,000 mg Intravenous New Bag 9/19/23 1808)   doxycycline (VIBRAMYCIN) 100 mg in sodium chloride 0.9 % 100 mL IVPB (has no administration in time range)   albuterol sulfate HFA (PROVENTIL HFA;VENTOLIN HFA;PROAIR HFA) inhaler 2 puff (has no administration in time range)   dexAMETHasone (DECADRON) tablet 6 mg (6 mg Oral Given  9/19/23 1810)   acetaminophen (TYLENOL) tablet 650 mg (has no administration in time range)   ondansetron (ZOFRAN) injection 4 mg (has no administration in time range)   furosemide (LASIX) injection 40 mg (40 mg Intravenous Given 9/19/23 1030)     XR Knee 3 View Left    Result Date: 9/19/2023  Impression: 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 Electronically Signed: Erik Baer MD  9/19/2023 6:27 AM EDT  Workstation ID: [id number]    CT Head Without Contrast    Result Date: 9/19/2023  Impression: 1.No acute intracranial abnormality. 2.Moderate chronic small vessel ischemic change. Electronically Signed: Erik Baer MD  9/19/2023 6:56 AM EDT  Workstation ID: [id number]    XR Chest 1 View    Result Date: 9/19/2023  Impression: Mixed interstitial and airspace disease throughout the right lung and at the left lung base which may relate to pulmonary edema or multifocal atypical/viral pneumonia. Electronically Signed: Farzad Moss MD  9/19/2023 7:43 AM EDT  Workstation ID: [id number]                                          Medical Decision Making  I attempted to ambulate the patient at discharge and he didn't do well.  Patient states he normally ambulates comfortably with a walker but was very unsteady and almost fell after taking a step with my assistance.  Patient tells me he feels weak and wobbly and this is not typical for him.  Patient is agreeable to further evaluation for his weakness.  Patient does intermittently have a moderate nonproductive cough.      Patient care assumed by JAYSON Nunez pending lab work imaging and expected admission for generalized weakness.  See work-up tab for details.  Patient admitted for generalized weakness, COVID, pulmonary edema    Problems Addressed:  Acute on chronic congestive heart failure, unspecified heart failure type: acute illness or injury  COVID: acute illness or injury  Elevated troponin: acute illness or injury  Fall, initial encounter: acute  illness or injury  Generalized weakness: acute illness or injury    Amount and/or Complexity of Data Reviewed  External Data Reviewed: notes.     Details: Patient Name: Jermaine Black  : 1926  MRN: [id number]     Discharge condition: Stable  Date of Admission: 2023  Discharge Diagnosis: Generalized weakness falls syncope  Date of Discharge:  23  Primary Care Physician: Provider, No Known     Presenting Problem:   Subarachnoid hemorrhage [I60.9]  Syncope [R55]  Urinary tract infection with hematuria, site unspecified [N39.0, R31.9]  Syncope, unspecified syncope type [R55]     Active and Resolved Hospital Problems:  Active Hospital Problems    Diagnosis POA   **Syncope [R55] Yes   Parkinson's disease [G20] Unknown   HTN (hypertension) [I10] Unknown   HLD (hyperlipidemia) [E78.5] Unknown   CAD (coronary artery disease) [I25.10] Unknown   CHF (congestive heart failure) [I50.9] Unknown   Dehydration [E86.0]  Labs: ordered. Decision-making details documented in ED Course.  Radiology: ordered. Decision-making details documented in ED Course.  ECG/medicine tests: ordered. Decision-making details documented in ED Course.    Risk  Prescription drug management.  Decision regarding hospitalization.        Final diagnoses:   Generalized weakness   Fall, initial encounter   COVID   Elevated troponin   Acute on chronic congestive heart failure, unspecified heart failure type       ED Disposition  ED Disposition       ED Disposition   Decision to Admit    Condition   --    Comment   Level of Care: Telemetry [5]   Diagnosis: COVID-19 [[id number]]   Admitting Physician: GLADYS LEAL [[id number]]   Attending Physician: GLADYS LEAL [[id number]]   Isolate for COVID?: Yes [1]   Certification: I Certify That Inpatient Hospital Services Are Medically Necessary For Greater Than 2 Midnights                 No follow-up provider specified.       Medication List      No changes were made to your prescriptions during this visit.             Adilene Barone PA  09/19/23 1823      Electronically signed by Claudio Arias MD at 09/19/23 0970       Zayra Ward, RN at 09/19/23 0547          Pt came in via EMS. Came from Home with complaints of Fall. EMS states they came in and patient was on his knees beside the bed. Pt alert and oriented to self and place. He states he can't remember how he fell. Noted bruising both knees. He states right knee hurts a little bit. Came in on 4lpm of 02, 02 sts 85% at home.     Electronically signed by Zayra Ward, RN at 09/19/23 0531       Vital Signs (last 2 days)       Date/Time Temp Temp src Pulse Resp BP Patient Position SpO2    09/20/23 0824 -- -- 70 19 -- -- 100    09/20/23 0819 -- -- 70 17 -- -- 100    09/20/23 0800 98 (36.7) Oral 69 18 121/74 Lying 97    09/20/23 0339 97.5 (36.4) Oral 70 18 101/60 Lying 94    09/20/23 0004 98 (36.7) Axillary 74 17 108/53 Lying 97    09/19/23 1945 -- -- 70 18 -- -- 93    09/19/23 1943 -- -- 70 18 -- -- 93    09/19/23 1907 98.7 (37.1) Oral 76 16 145/55 Lying 91    09/19/23 1507 -- -- 82 -- 135/56 -- 97    09/19/23 1506 100.8 (38.2) Oral 79 25 135/56 Lying 97    09/19/23 1401 99.2 (37.3) -- -- -- 173/76 Lying 95    09/19/23 1302 -- -- -- -- 172/88 Lying --    09/19/23 1131 -- -- 79 -- 165/86 -- --    09/19/23 1046 -- -- 70 -- 165/84 -- 95    09/19/23 1031 -- -- 71 -- 158/76 -- 95    09/19/23 1030 -- -- 70 -- 158/76 -- --    09/19/23 1016 -- -- 71 -- 146/70 -- 93    09/19/23 1001 -- -- 76 -- 166/79 -- 94    09/19/23 0931 -- -- 71 -- 161/80 -- --    09/19/23 0916 -- -- 70 -- 156/80 -- --    09/19/23 0901 -- -- 70 -- 148/72 -- --    09/19/23 0816 -- -- 74 -- 138/68 -- 91    09/19/23 0746 -- -- 72 -- 139/68 -- 91    09/19/23 0731 -- -- 71 -- 140/70 -- 92    09/19/23 0701 -- -- 72 -- 123/57 -- 93    09/19/23 0601 -- -- 73 20 135/73 -- 92    09/19/23 0555 -- -- 72 20 134/67 -- 98    09/19/23 0540 99.7 (37.6) -- 68 20 125/60 -- 95          Oxygen Therapy (last 2 days)        Date/Time SpO2 Device (Oxygen Therapy) Flow (L/min) Oxygen Concentration (%) ETCO2 (mmHg)    09/20/23 0852 -- nasal cannula 2 -- --    09/20/23 0824 100 nasal cannula 2 -- --    09/20/23 0819 100 nasal cannula 2 -- --    09/20/23 0800 97 nasal cannula 2 -- --    09/20/23 0400 -- nasal cannula 2 -- --    09/20/23 0339 94 -- -- -- --    09/20/23 0004 97 -- -- -- --    09/20/23 0000 -- nasal cannula 2 -- --    09/19/23 2000 -- nasal cannula 2 -- --    09/19/23 1945 93 nasal cannula 2 -- --    09/19/23 1943 93 nasal cannula 2 -- --    09/19/23 1907 91 -- -- -- --    09/19/23 1507 97 nasal cannula 3 -- --    09/19/23 1506 97 nasal cannula 4 -- --    09/19/23 1434 -- nasal cannula 4 -- --    09/19/23 1401 95 nasal cannula 4 -- --    09/19/23 1302 -- nasal cannula 2 -- --    09/19/23 1046 95 -- -- -- --    09/19/23 1031 95 nasal cannula 1 -- --    09/19/23 1016 93 -- -- -- --    09/19/23 1001 94 -- -- -- --    09/19/23 0816 91 -- -- -- --    09/19/23 0746 91 -- -- -- --    09/19/23 0731 92 -- -- -- --    09/19/23 0701 93 room air -- -- --    09/19/23 0601 92 room air -- -- --    09/19/23 0555 98 -- 2 -- --    09/19/23 0540 95 nasal cannula 4 -- --          Facility-Administered Medications as of 9/20/2023   Medication Dose Route Frequency Provider Last Rate Last Admin    acetaminophen (TYLENOL) tablet 650 mg  650 mg Oral Q6H PRN Allison, Jalaram, DO   650 mg at 09/19/23 1901    albuterol sulfate HFA (PROVENTIL HFA;VENTOLIN HFA;PROAIR HFA) inhaler 2 puff  2 puff Inhalation 4x Daily - RT Rolan Staley MD   2 puff at 09/20/23 0819    benzonatate (TESSALON) capsule 100 mg  100 mg Oral TID PRN Lorena Rosas PA-C        budesonide (PULMICORT) nebulizer solution 0.5 mg  0.5 mg Nebulization BID - RT Hanh Galloway, JUSTO        carbidopa-levodopa ER (SINEMET CR)  MG per tablet 1 tablet  1 tablet Oral TID Lorena Rosas PA-C   1 tablet at 09/20/23 0814    cefTRIAXone (ROCEPHIN) 1,000 mg in sodium chloride 0.9 % 100 mL IVPB   1,000 mg Intravenous Q24H Hanh Galloway APRN 200 mL/hr at 09/19/23 1808 1,000 mg at 09/19/23 1808    dexAMETHasone (DECADRON) tablet 6 mg  6 mg Oral Daily With Breakfast AllisonRalphlaDO oleg   6 mg at 09/20/23 0814    doxycycline (VIBRAMYCIN) 100 mg in sodium chloride 0.9 % 100 mL IVPB  100 mg Intravenous Q12H Hanh Galloway APRN   100 mg at 09/20/23 0812    Enoxaparin Sodium (LOVENOX) syringe 40 mg  40 mg Subcutaneous Q24H Lorena Rosas PA-C   40 mg at 09/19/23 1808    erythromycin (ROMYCIN) ophthalmic ointment 1 application   1 application  Both Eyes Nightly Lorena Rosas PA-C   1 application  at 09/19/23 2052    finasteride (PROSCAR) tablet 5 mg  5 mg Oral Daily Lorena Rosas PA-C   5 mg at 09/20/23 0815    [COMPLETED] furosemide (LASIX) injection 40 mg  40 mg Intravenous Once Adilene Barone PA   40 mg at 09/19/23 1030    furosemide (LASIX) injection 40 mg  40 mg Intravenous Q12H Hanh Galloway APRN   40 mg at 09/20/23 0558    hydrALAZINE (APRESOLINE) injection 10 mg  10 mg Intravenous Q4H PRN Rolan Staley MD   10 mg at 09/19/23 1408    isosorbide mononitrate (IMDUR) 24 hr tablet 60 mg  60 mg Oral Daily Lorena Rosas PA-C   60 mg at 09/20/23 0815    levothyroxine (SYNTHROID, LEVOTHROID) tablet 75 mcg  75 mcg Oral Daily With Breakfast Lorena Rosas PA-C   75 mcg at 09/20/23 0558    metoprolol succinate XL (TOPROL-XL) 24 hr tablet 50 mg  50 mg Oral Daily Lorena Rosas PA-C   50 mg at 09/20/23 0815    nitroglycerin (NITROSTAT) SL tablet 0.4 mg  0.4 mg Sublingual Q5 Min PRN Lorena Rosas PA-C        ondansetron (ZOFRAN) injection 4 mg  4 mg Intravenous Q6H PRN James Allison, DO        Pharmacy to Dose enoxaparin (LOVENOX)   Does not apply Continuous PRN Lorena Rosas PA-C        rosuvastatin (CRESTOR) tablet 10 mg  10 mg Oral Daily Lorena Rosas PA-C   10 mg at 09/20/23 0814    tamsulosin (FLOMAX) 24 hr capsule 0.4 mg  0.4 mg Oral Daily Lorena Rosas PA-C   0.4 mg at 09/20/23 0815         Physician Progress Notes (all)        James Allison DO at 23 0934          [facility] Medicine Services   Daily Progress Note      Patient Name: Jermaine Black  : 1926  MRN: [id number]  Primary Care Physician:  Provider, No Known  Date of admission: 2023      Subjective      Chief Complaint: Shortness of breath, fall    Patient seen and examined this morning.  Taking over care today.  Doing well, alert and oriented and able to answer all questions.  States he feels generalized weak but his shortness of breath is stable.  Denies any fever or chills.  No cough or nausea/vomiting.    Pertinent positives as noted in HPI/subjective.  All other systems were reviewed and are negative.      Objective      Vitals:   Temp:  [97.5 °F (36.4 °C)-100.8 °F (38.2 °C)] 98 °F (36.7 °C)  Heart Rate:  [69-82] 70  Resp:  [16-25] 19  BP: (101-173)/(53-88) 121/74  Flow (L/min):  [1-4] 2    Physical Exam:    General: Awake, alert, elderly male, lying in bed, NAD  Eyes: PERRL, EOMI, conjunctivae are clear  Cardiovascular: Regular rate and rhythm, no murmurs  Respiratory: Clear to auscultation bilaterally, no wheezing or rales, unlabored breathing  Abdomen: Soft, nontender, positive bowel sounds, no guarding  Neurologic: A&O, CN grossly intact, moves all extremities spontaneously  Musculoskeletal: Generalized weakness, no other gross deformities  Skin: Warm, dry, intact         Result Review    Result Review:  I have personally reviewed the results from the time of this admission to 2023 09:34 EDT and agree with these findings:  [x]  Laboratory  [x]  Microbiology  [x]  Radiology  [x]  EKG/Telemetry   [x]  Cardiology/Vascular   []  Pathology  [x]  Old records  []  Other:    Wounds (last 24 hours)       LDA Wound       Row Name 23 0852 23 1614          Wound 23 173 coccyx    Wound - Properties Group Placement Date: 23  -EV Placement Time:   -EV Location: coccyx  -EV     Wound Image -- Images linked: 1  -TP     Dressing Appearance dry;intact  -AW --     Care, Wound cleansed with;soap and water  -AW --     Dressing Care dressing changed  -AW --     Retired Wound - Properties Group Placement Date: 09/01/23  -EV Placement Time: 1731 -EV Location: coccyx  -EV    Retired Wound - Properties Group Date first assessed: 09/01/23  -EV Time first assessed: 1731  -EV Location: coccyx  -EV              User Key  (r) = Recorded By, (t) = Taken By, (c) = Cosigned By      Initials Name Provider Type    Sintia Thomas LPN Licensed Nurse    Mariam Zamorano LPN Licensed Nurse    Kim Purvis LPN Licensed Nurse                      Assessment & Plan      Brief Patient Summary:  Jermaine Black is a 89 y.o. male with a history of COPD, CHF with unknown EF, Parkinson's Disease, Hypothyroidism, CAD, Hyperlipidemia, GERD, BPH, and HTN who came to the ER after a fall on the morning of admission. Patient states that his legs gave out and he fell onto his knees but did not hit his head. He further states he has had a cough for the past 3 weeks but no sputum production. In the ER patient noted with COVID-19 with atypical pneumonia as well as possible CHF exacerbation with elevated BNP.  Hypoxic requiring 2 to 3 L nasal cannula, started on dexamethasone and Lasix and pulmonology also consulted.      albuterol sulfate HFA, 2 puff, Inhalation, 4x Daily - RT  budesonide, 0.5 mg, Nebulization, BID - RT  carbidopa-levodopa ER, 1 tablet, Oral, TID  cefTRIAXone, 1,000 mg, Intravenous, Q24H  dexAMETHasone, 6 mg, Oral, Daily With Breakfast  doxycycline, 100 mg, Intravenous, Q12H  enoxaparin, 40 mg, Subcutaneous, Q24H  erythromycin, 1 application , Both Eyes, Nightly  finasteride, 5 mg, Oral, Daily  furosemide, 40 mg, Intravenous, Q12H  isosorbide mononitrate, 60 mg, Oral, Daily  levothyroxine, 75 mcg, Oral, Daily With Breakfast  metoprolol succinate XL, 50 mg, Oral, Daily  rosuvastatin, 10 mg, Oral,  Daily  tamsulosin, 0.4 mg, Oral, Daily       Pharmacy to Dose enoxaparin (LOVENOX),          I have utilized all available, immediate resources to obtain, update, or review the patient's current medications including all prescriptions, over-the-counter products, herbals, cannabis/cannabidiol products, and vitamin.mineral/dietary (nutritional) supplements.    Active Hospital Problems:  Active Hospital Problems    Diagnosis     **COVID-19     COVID-19 virus detected      Plan:     Acute hypoxemic respiratory failure  COVID-19 infection with atypical pneumonia  -Chest x-ray findings noted  -Continue bronchodilators and dexamethasone  -Requiring 2-4 L NC, wean down to room air as tolerated  -Encourage spirometry  -Pulmonology following    Acute CHF exacerbation  -Unknown EF, proBNP significantly elevated on admission  -Echo ordered to evaluate EF  -Patient on home Lasix, continue IV Lasix to diurese and monitor I's and O's  -Due to advanced age we will treat conservatively for now pending echo results    Elevated D-dimer  -Likely secondary to COVID, less likely VTE  -CTA chest ordered to rule out PE  -Monitor    Parkinson's disease  Deconditioning  -Continue home meds  -PT/OT    Hypertension  Hyperlipidemia  -Blood pressure controlled  -Continue home meds as tolerated, monitor    Hypothyroidism  GERD  BPH  -Continue home meds as tolerated, monitor    DVT prophylaxis  -Lovenox    CODE STATUS:    Medical Intervention Limits: NO intubation (DNI)  Level Of Support Discussed With: Health Care Surrogate  Code Status (Patient has no pulse and is not breathing): No CPR (Do Not Attempt to Resuscitate)  Medical Interventions (Patient has pulse or is breathing): Limited Support      Disposition: Pending improvement, may need placement.    Electronically signed by James Allison DO, 09/20/23, 09:34 EDT.  [facility] Hospitalist Team      Part of this note may be an electronic transcription/translation of spoken language to  printed text using the Dragon Dictation System.      Electronically signed by James Allison DO at 23 0940          Consult Notes (all)        Doroteo Tristan MD at 23 1505        Consult Orders    1. Inpatient Pulmonology Consult [[id number]] ordered by Lorena Rosas PA-C at 23 1400                 PULMONARY CRITICAL CARE CONSULT NOTE      PATIENT IDENTIFICATION:  Name: Jermaine Black  MRN: [id number]  :  1926     Age: 89 y.o.  Sex: male        DATE OF CONSULTATION:  2023  PRIMARY CARE PHYSICIAN    Provider, No Known                  CHIEF COMPLAINT: SOB     History of Present Illness:   Jermaine Black is a 89 y.o. male with a history of COPD, CHF, Parkinson's Disease, Hypothyroidism, CAD, Hyperlipidemia, GERD, BPH, and HTN who came to the ER after a fall this morning. Patient states that his legs gave out and he fell onto his knees but did not hit his head. He further states he has had a cough for the past 3 weeks but no sputum production. In the ER patient noted with COVID-19 and Atypical pneumonia and admitted for further treatment.       Review of Systems:   Constitutional: As above    Eyes: negative   ENT/oropharynx: negative   Cardiovascular: As above    Respiratory: As above    Gastrointestinal: negative   Genitourinary: negative   Neurological: negative   Musculoskeletal: negative   Integument/breast: negative   Endocrine: negative   Allergic/Immunologic: negative     Past Medical History:  No past medical history on file.    Past Surgical History:  No past surgical history on file.     Family History:  No family history on file.     Social History:   Social History     Tobacco Use    Smoking status: Never    Smokeless tobacco: Never   Substance Use Topics    Alcohol use: Never        Allergies:  No Known Allergies    Home Meds:  Medications Prior to Admission   Medication Sig Dispense Refill Last Dose    carbidopa-levodopa ER (SINEMET CR)  MG per tablet Take 1  "tablet by mouth 3 (Three) Times a Day.       carboxymethylcellulose (REFRESH PLUS) 0.5 % solution Administer 1 drop to both eyes 2 (Two) Times a Day.       colesevelam (WELCHOL) 625 MG tablet Take 1 tablet by mouth 2 (Two) Times a Day With Meals.       dutasteride (AVODART) 0.5 MG capsule Take 1 capsule by mouth Daily.       erythromycin (ROMYCIN) 5 MG/GM ophthalmic ointment Administer 1 application  to both eyes Every Night.       furosemide (LASIX) 40 MG tablet Take 1 tablet by mouth Daily.       isosorbide mononitrate (IMDUR) 60 MG 24 hr tablet Take 1 tablet by mouth Daily.       levothyroxine (SYNTHROID, LEVOTHROID) 75 MCG tablet Take 1 tablet by mouth Daily.       metoprolol succinate XL (TOPROL-XL) 50 MG 24 hr tablet Take 1 tablet by mouth Daily.       nitroglycerin (NITROSTAT) 0.4 MG SL tablet Place 1 tablet under the tongue Every 5 (Five) Minutes As Needed for Chest Pain. Take no more than 3 doses in 15 minutes.       Pimavanserin Tartrate (Nuplazid) 34 MG capsule Take 1 capsule by mouth Every Night.       rosuvastatin (CRESTOR) 10 MG tablet Take 1 tablet by mouth Daily.       tamsulosin (FLOMAX) 0.4 MG capsule 24 hr capsule Take 1 capsule by mouth Daily.       potassium chloride 10 MEQ CR tablet Take 1 tablet by mouth Daily.          Objective:  tMax 24 hrs: Temp (24hrs), Av.5 °F (37.5 °C), Min:99.2 °F (37.3 °C), Max:99.7 °F (37.6 °C)      Vitals Ranges:   Temp:  [99.2 °F (37.3 °C)-99.7 °F (37.6 °C)] 99.2 °F (37.3 °C)  Heart Rate:  [68-79] 79  Resp:  [20] 20  BP: (123-173)/(57-88) 173/76    Intake and Output Last 3 Shifts:   No intake/output data recorded.    Exam:  /76 (BP Location: Right arm, Patient Position: Lying)   Pulse 79   Temp 99.2 °F (37.3 °C)   Resp 20   Ht 167.6 cm (66\")   Wt 72.6 kg (160 lb)   SpO2 95%   BMI 25.82 kg/m²       23  0540   Weight: 72.6 kg (160 lb)     General Appearance: Alert     HEENT:  Normocephalic, without obvious abnormality, atraumatic. " Conjunctivae/corneas clear.  Normal external ear canals. Nares normal, no drainage.  Neck:  Supple, symmetrical, trachea midline. No JVD.  Lungs /Chest wall:   Bilateral basal rhonchi, respirations unlabored, symmetrical wall movement.     Heart:  Regular rate and rhythm, systolic murmur PMI left sternal border  Abdomen: Soft, nontender, no masses, no organomegaly.    Extremities: Trace edema, no clubbing or cyanosis        Data Review:  All labs (24hrs):   Recent Results (from the past 24 hour(s))   ECG 12 Lead Other; fatigue    Collection Time: 09/19/23  7:25 AM   Result Value Ref Range    QT Interval 413 ms    QTC Interval 453 ms   Basic Metabolic Panel    Collection Time: 09/19/23  7:29 AM    Specimen: Blood   Result Value Ref Range    Glucose 114 (H) 65 - 99 mg/dL    BUN 19 8 - 23 mg/dL    Creatinine 1.06 0.76 - 1.27 mg/dL    Sodium 141 136 - 145 mmol/L    Potassium 4.3 3.5 - 5.2 mmol/L    Chloride 103 98 - 107 mmol/L    CO2 32.0 (H) 22.0 - 29.0 mmol/L    Calcium 9.2 8.2 - 9.6 mg/dL    BUN/Creatinine Ratio 17.9 7.0 - 25.0    Anion Gap 6.0 5.0 - 15.0 mmol/L    eGFR 64.2 >60.0 mL/min/1.73   Single High Sensitivity Troponin T    Collection Time: 09/19/23  7:29 AM    Specimen: Blood   Result Value Ref Range    HS Troponin T 76 (C) <15 ng/L   BNP    Collection Time: 09/19/23  7:29 AM    Specimen: Blood   Result Value Ref Range    proBNP 9,356.0 (H) 0.0 - 1,800.0 pg/mL   CBC Auto Differential    Collection Time: 09/19/23  7:29 AM    Specimen: Blood   Result Value Ref Range    WBC 7.30 3.40 - 10.80 10*3/mm3    RBC 3.93 (L) 4.14 - 5.80 10*6/mm3    Hemoglobin 12.8 (L) 13.0 - 17.7 g/dL    Hematocrit 38.8 37.5 - 51.0 %    MCV 98.7 (H) 79.0 - 97.0 fL    MCH 32.6 26.6 - 33.0 pg    MCHC 33.0 31.5 - 35.7 g/dL    RDW 15.5 (H) 12.3 - 15.4 %    RDW-SD 52.9 37.0 - 54.0 fl    MPV 8.7 6.0 - 12.0 fL    Platelets 151 140 - 450 10*3/mm3    Neutrophil % 83.4 (H) 42.7 - 76.0 %    Lymphocyte % 3.3 (L) 19.6 - 45.3 %    Monocyte % 12.8 (H)  5.0 - 12.0 %    Eosinophil % 0.2 (L) 0.3 - 6.2 %    Basophil % 0.3 0.0 - 1.5 %    Neutrophils, Absolute 6.10 1.70 - 7.00 10*3/mm3    Lymphocytes, Absolute 0.20 (L) 0.70 - 3.10 10*3/mm3    Monocytes, Absolute 0.90 0.10 - 0.90 10*3/mm3    Eosinophils, Absolute 0.00 0.00 - 0.40 10*3/mm3    Basophils, Absolute 0.00 0.00 - 0.20 10*3/mm3    nRBC 0.0 0.0 - 0.2 /100 WBC   Gold Top - SST    Collection Time: 09/19/23  7:29 AM   Result Value Ref Range    Extra Tube Hold for add-ons.    Respiratory Panel PCR w/COVID-19(SARS-CoV-2) KATIE/JIMENA/VIKTORIA/PAD/COR/MAD/LEOLA In-House, NP Swab in UTM/VTM, 3-4 HR TAT - Swab, Nasopharynx    Collection Time: 09/19/23  7:30 AM    Specimen: Nasopharynx; Swab   Result Value Ref Range    ADENOVIRUS, PCR Not Detected Not Detected    Coronavirus 229E Not Detected Not Detected    Coronavirus HKU1 Not Detected Not Detected    Coronavirus NL63 Not Detected Not Detected    Coronavirus OC43 Not Detected Not Detected    COVID19 Detected (C) Not Detected - Ref. Range    Human Metapneumovirus Not Detected Not Detected    Human Rhinovirus/Enterovirus Not Detected Not Detected    Influenza A PCR Not Detected Not Detected    Influenza B PCR Not Detected Not Detected    Parainfluenza Virus 1 Not Detected Not Detected    Parainfluenza Virus 2 Not Detected Not Detected    Parainfluenza Virus 3 Not Detected Not Detected    Parainfluenza Virus 4 Not Detected Not Detected    RSV, PCR Not Detected Not Detected    Bordetella pertussis pcr Not Detected Not Detected    Bordetella parapertussis PCR Not Detected Not Detected    Chlamydophila pneumoniae PCR Not Detected Not Detected    Mycoplasma pneumo by PCR Not Detected Not Detected   Urinalysis With Culture If Indicated - Straight Cath    Collection Time: 09/19/23  8:09 AM    Specimen: Straight Cath; Urine   Result Value Ref Range    Color, UA Yellow Yellow, Straw    Appearance, UA Clear Clear    pH, UA 6.0 5.0 - 8.0    Specific Gravity, UA 1.020 1.005 - 1.030    Glucose,  UA Negative Negative    Ketones, UA Trace (A) Negative    Bilirubin, UA Negative Negative    Blood, UA Moderate (2+) (A) Negative    Protein, UA Negative Negative    Leuk Esterase, UA Negative Negative    Nitrite, UA Negative Negative    Urobilinogen, UA 1.0 E.U./dL 0.2 - 1.0 E.U./dL   Urinalysis, Microscopic Only - Straight Cath    Collection Time: 09/19/23  8:09 AM    Specimen: Straight Cath; Urine   Result Value Ref Range    RBC, UA Too Numerous to Count (A) None Seen /HPF    WBC, UA 0-2 (A) None Seen /HPF    Bacteria, UA None Seen None Seen /HPF    Squamous Epithelial Cells, UA 0-2 None Seen, 0-2 /HPF    Hyaline Casts, UA None Seen None Seen /LPF    Methodology Automated Microscopy    High Sensitivity Troponin T 2Hr    Collection Time: 09/19/23  9:31 AM    Specimen: Blood   Result Value Ref Range    HS Troponin T 66 (C) <15 ng/L    Troponin T Delta -10 (L) >=-4 - <+4 ng/L   C-reactive Protein    Collection Time: 09/19/23  9:31 AM    Specimen: Blood   Result Value Ref Range    C-Reactive Protein 0.56 (H) 0.00 - 0.50 mg/dL   Ferritin    Collection Time: 09/19/23  9:31 AM    Specimen: Blood   Result Value Ref Range    Ferritin 265.30 30.00 - 400.00 ng/mL   Lactate Dehydrogenase    Collection Time: 09/19/23  9:31 AM    Specimen: Blood   Result Value Ref Range     (H) 135 - 225 U/L        Imaging:  XR Knee 3 View Left    Result Date: 9/19/2023  XR KNEE 3 VW LEFT Date of Exam: 9/19/2023 6:17 AM EDT Indication: trauma Comparison: None available. 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 The prosthesis appears intact. Alignment is anatomic. No acute fracture or dislocation. Soft tissues are unremarkable.     Impression: 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 Electronically Signed: Erik Baer MD  9/19/2023 6:27 AM EDT  Workstation ID: [id number]    CT Head Without Contrast    Result Date: 9/19/2023  CT HEAD WO CONTRAST Date of Exam: 9/19/2023 6:38 AM EDT Indication: trauma.  Comparison: 8/31/2023 Technique: Axial CT images were obtained of the head without contrast administration.  Coronal reconstructions were performed.  Automated exposure control and iterative reconstruction methods were used. Findings: The paranasal sinuses and mastoid air cells appear well aerated. The calvarium is intact. There is no focal soft tissue hematoma. Orbits appear within normal limits. There are mild intracranial vascular calcifications. There is no acute intracranial hemorrhage. There is a small stable parenchymal calcification within the parasagittal posterior left frontal lobe. There is age-appropriate generalized parenchymal volume loss. There are moderate patchy foci of white matter hypoattenuation. There are no new areas of hypoattenuation in the brain. No abnormal extra-axial collections. No mass effect or midline shift.     Impression: 1.No acute intracranial abnormality. 2.Moderate chronic small vessel ischemic change. Electronically Signed: Erik Baer MD  9/19/2023 6:56 AM EDT  Workstation ID: [id number]    CT Head Without Contrast    Result Date: 8/31/2023  CT HEAD WO CONTRAST Date of Exam: 8/31/2023 6:14 PM EDT Indication: Stroke suspected (Ped 0-17y). Comparison: 8/30/2023. Technique: Axial CT images were obtained of the head without contrast administration.  Coronal reconstructions were performed.  Automated exposure control and iterative reconstruction methods were used. Findings: No large territory infarct. Unchanged hyperdensity along the left frontal lobe is essentially unchanged from prior studies most likely represents calcifications. No definite intracranial hemorrhage. No mass effect, edema or midline shift Moderate periventricular and subcortical white matter hypodensities, nonspecific but most likely represents chronic small vessel ischemic changes. Unchanged appearance of right frontal convexity low density fluid collection measuring 1.2 cm, that most likely represents an old  subdural hemorrhage versus subdural hygroma. No new or acute extra-axial fluid collection. Prominent ventricular system secondary to chronic parenchymal volume loss. The visualized orbits are unremarkable. The visualized paranasal sinuses and mastoid air cells are clear. The visualized soft tissues are unremarkable. No acute osseous abnormality.     Impression: 1.Left frontal lobe hyperdensity is essentially unchanged from prior studies. Given the lack of evolution, this most likely represents a calcification rather than an intracranial hemorrhage. No definite intracranial hemorrhage. 2.Unchanged right frontal convexity low density fluid collection measuring 1.2 cm. Electronically Signed: Chemo Vargas DO  8/31/2023 6:31 PM EDT  Workstation ID: [id number]    CT Head Without Contrast    Result Date: 8/31/2023  CT HEAD WO CONTRAST Date of Exam: 8/30/2023 11:50 PM EDT Indication: Syncope/presyncope, cerebrovascular cause suspected follow up on possible SAH from previous CT. Comparison: 8/30/2023. Technique: Axial CT images were obtained of the head without contrast administration.  Coronal reconstructions were performed.  Automated exposure control and iterative reconstruction methods were used. Findings: There is redemonstration of a small hyperdensity adjacent to the left superior frontal gyrus on image 34 measuring 3-4 mm. There is no new hyperdensity. There is moderate generalized parenchymal volume loss. There are moderate patchy areas of white matter hypoattenuation. There are intracranial vascular calcifications. There are prominent CSF spaces most pronounced in the right frontal region favored to represent a result of parenchymal volume loss. The calvarium appears intact. The orbits appear unremarkable. The paranasal sinuses and mastoid air cells appear well aerated. Superficial soft tissues appear unremarkable. There are vascular calcifications in the scalp.     Impression: 1. Stable 3-4 mm subtle  hyperdensity adjacent to the left superior frontal gyrus. In the absence of trauma, this may represent a parenchymal calcification. Otherwise, this still could represent a small focus of subarachnoid hemorrhage. There do not appear to be significant external signs of trauma to the head. Consider 24-hour follow-up head CT. 2. Moderate chronic small vessel ischemic change and moderate generalized parenchymal volume loss. Electronically Signed: Erik Baer MD  8/31/2023 12:18 AM EDT  Workstation ID: [id number]    CT Head Without Contrast    Result Date: 8/30/2023  CT HEAD WO CONTRAST Date of Exam: 8/30/2023 6:40 PM EDT Indication: syncope. Comparison: None available. Technique: Axial CT images were obtained of the head without contrast administration.  Coronal reconstructions were performed.  Automated exposure control and iterative reconstruction methods were used. Findings: No large territory infarct. Subtle hyperdensity in the left frontal lobe may represent calcifications versus a trace subarachnoid hemorrhage. No other acute intracranial hemorrhages are identified. No mass effect, edema or midline shift Moderate periventricular and subcortical white matter hypodensities, nonspecific but most likely represents chronic small vessel ischemic changes. No extra-axial fluid collection. Prominent ventricular system secondary to chronic parenchymal volume loss. Low density fluid in the right frontal convexity measuring 1.2 cm in thickness is concerning for a old subdural hemorrhage versus subdural hygroma. The visualized orbits are unremarkable. The visualized paranasal sinuses and mastoid air cells are clear. The visualized soft tissues are unremarkable. No acute osseous abnormality.     Impression: 1.Subtle hyperdensity in the left frontal lobe may represent calcifications, although a trace subarachnoid hemorrhage is not completely excluded. Please consider repeat CT in 4 to 6 hours to document stability. 2.Low-density  fluid along the right frontal convexity, measuring 1.2 cm in thickness, is concerning for an old subdural hemorrhage versus subdural hygroma. Electronically Signed: Chemo Vargas DO  8/30/2023 7:15 PM EDT  Workstation ID: [id number]    XR Chest 1 View    Result Date: 9/19/2023  XR CHEST 1 VW Date of Exam: 9/19/2023 7:34 AM EDT Indication: hypoxemia Comparison: 8/30/2023 Findings: Left-sided AICD noted. Heart size top normal, unchanged. No pneumothorax. Mixed interstitial airspace disease involving the right lung with patchy airspace disease at the left lung base. No pneumothorax. No significant pleural effusion. Osseous structures grossly intact. Dense aortic arch atherosclerotic calcifications.     Impression: Mixed interstitial and airspace disease throughout the right lung and at the left lung base which may relate to pulmonary edema or multifocal atypical/viral pneumonia. Electronically Signed: Farzad Moss MD  9/19/2023 7:43 AM EDT  Workstation ID: [id number]    XR Chest 1 View    Result Date: 8/30/2023  XR CHEST 1 VW Date of Exam: 8/30/2023 5:20 PM EDT Indication: syncope Comparison: None available. Findings: Lines: Left subclavian dual-lead pacemaker is intact. Lungs: Poor respiratory effort accentuates the pulmonary vasculature and cardiomediastinal silhouette. No definite consolidation. Pleura: No pleural effusion or pneumothorax. Cardiomediastinum: The cardiomediastinal silhouette is normal Soft Tissues: Unremarkable. Bones: No acute osseous abnormality.     Impression: Low lung volumes accentuates pulmonary vasculature and cardiomediastinal silhouette. No definite acute cardiopulmonary abnormality. Electronically Signed: Chemo Vargas DO  8/30/2023 5:50 PM EDT  Workstation ID: [id number]       Current:  budesonide-formoterol, 2 puff, Inhalation, BID - RT  carbidopa-levodopa ER, 1 tablet, Oral, TID  enoxaparin, 40 mg, Subcutaneous, Q24H  erythromycin, 1 application , Both Eyes, Nightly  [START ON  9/20/2023] finasteride, 5 mg, Oral, Daily  furosemide, 40 mg, Intravenous, Q12H  isosorbide mononitrate, 60 mg, Oral, Daily  [START ON 9/20/2023] levothyroxine, 75 mcg, Oral, Daily With Breakfast  methylPREDNISolone sodium succinate, 80 mg, Intravenous, Daily  metoprolol succinate XL, 50 mg, Oral, Daily  [START ON 9/20/2023] rosuvastatin, 10 mg, Oral, Daily  [START ON 9/20/2023] tamsulosin, 0.4 mg, Oral, Daily        Infusion:  Pharmacy to Dose enoxaparin (LOVENOX),          PRN:    benzonatate    hydrALAZINE    nitroglycerin    Pharmacy to Dose enoxaparin (LOVENOX)    ASSESSMENT:  Acute hypoxic respiratory distress  COVID-19  Atypical pneumonia  COPD  CHF  BPH  HTN  Parkinson's disease   Hypothyroidism  GERD  CAD       PLAN:  Antibiotics  Bronchodilators  Inhaled corticosteroids  Get Echo   Watch INOS  Keep NPO till eval for swallow   Incentive spirometer  Electrolytes/ glycemic control  DVT and GI prophylaxis-Lovenox     Discussed with Dr Kun Galloway, JUSTO  9/19/2023  15:06 EDT      I personally have examined  and interviewed the patient. I have reviewed the history, data, problems, assessment and plan with our NP.  Total Critical care time in direct medical management (   ) minutes, This time specifically excludes time spent performing procedures.    Doroteo Tristan MD   9/19/2023  15:18 EDT         Electronically signed by Doroteo Tristan MD at 09/19/23 5103

## 2023-09-20 NOTE — PLAN OF CARE
Problem: Fall Injury Risk  Goal: Absence of Fall and Fall-Related Injury  Intervention: Identify and Manage Contributors  Description: Develop a fall prevention plan with the patient and caregiver/family.  Provide reorientation, appropriate sensory stimulation and routines with changes in mental status to decrease risk of fall.  Promote use of personal vision and auditory aids.  Assess assistance level required for safe and effective self-care; provide support as needed, such as toileting, mobilization. For age 65 and older, implement timed toileting with assistance.  Encourage physical activity, such as performance of mobility and self-care at highest level of patient ability, multicomponent exercise program and provision of appropriate assistive devices.  If fall occurs, assess the severity of injury; implement fall injury protocol. Determine the cause and revise fall injury prevention plan.  Regularly review medication contribution to fall risk; adjust medication administration times to minimize risk of falling.  Consider risk related to polypharmacy and age.  Balance adequate pain management with potential for oversedation.  Recent Flowsheet Documentation  Taken 9/19/2023 2000 by Tang Lockett, RN  Medication Review/Management:   medications reviewed   high-risk medications identified  Intervention: Promote Injury-Free Environment  Description: Provide a safe, barrier-free environment that encourages independent activity.  Keep care area uncluttered and well-lighted.  Determine need for increased observation or monitoring.  Avoid use of devices that minimize mobility, such as restraints or indwelling urinary catheter.  Recent Flowsheet Documentation  Taken 9/20/2023 0400 by Tang Lockett, RN  Safety Promotion/Fall Prevention:   activity supervised   assistive device/personal items within reach   clutter free environment maintained   fall prevention program maintained   gait belt   safety round/check  completed   room organization consistent  Taken 9/20/2023 0200 by Tang Lockett RN  Safety Promotion/Fall Prevention:   activity supervised   assistive device/personal items within reach   clutter free environment maintained   fall prevention program maintained   gait belt   safety round/check completed   room organization consistent  Taken 9/20/2023 0000 by Tang Lockett RN  Safety Promotion/Fall Prevention:   activity supervised   assistive device/personal items within reach   clutter free environment maintained   fall prevention program maintained   gait belt   safety round/check completed   room organization consistent  Taken 9/19/2023 2200 by Tang Lockett RN  Safety Promotion/Fall Prevention:   activity supervised   assistive device/personal items within reach   clutter free environment maintained   fall prevention program maintained   gait belt   safety round/check completed   room organization consistent  Taken 9/19/2023 2000 by Tang Lockett RN  Safety Promotion/Fall Prevention:   activity supervised   assistive device/personal items within reach   clutter free environment maintained   fall prevention program maintained   gait belt   safety round/check completed   room organization consistent     Problem: Skin Injury Risk Increased  Goal: Skin Health and Integrity  Intervention: Optimize Skin Protection  Description: Perform a full pressure injury risk assessment, as indicated by screening, upon admission to care unit.  Reassess skin (injury risk, full inspection) frequently (e.g., scheduled interval, with change in condition) to provide optimal early detection and prevention.  Maintain adequate tissue perfusion (e.g., encourage fluid balance; avoid crossing legs, constrictive clothing or devices) to promote tissue oxygenation.  Maintain head of bed at lowest degree of elevation tolerated, considering medical condition and other restrictions.  Avoid positioning onto an area that remains  reddened.  Minimize incontinence and moisture (e.g., toileting schedule; moisture-wicking pad, diaper or incontinence collection device; skin moisture barrier).  Cleanse skin promptly and gently when soiled utilizing a pH-balanced cleanser.  Relieve and redistribute pressure (e.g., scheduled position changes, weight shifts, use of support surface, medical device repositioning, protective dressing application, use of positioning device, microclimate control, use of pressure-injury-monitor  Encourage increased activity, such as sitting in a chair at the bedside or early mobilization, when able to tolerate.  Recent Flowsheet Documentation  Taken 9/19/2023 2000 by Tang Lockett, RN  Pressure Reduction Techniques: frequent weight shift encouraged  Pressure Reduction Devices: pressure-redistributing mattress utilized  Skin Protection:   adhesive use limited   incontinence pads utilized   Goal Outcome Evaluation:         Patient rested well during the night.  2L O2 and maintains sats in the 90s.  Patient denies any pain or respiratory distress.  Good urine output on IV lasix every 12 hours.

## 2023-09-20 NOTE — PROGRESS NOTES
[facility] Medicine Services   Daily Progress Note      Patient Name: Jermaine Black  : 1926  MRN: [id number]  Primary Care Physician:  Provider, No Known  Date of admission: 2023      Subjective      Chief Complaint: Shortness of breath, fall    Patient seen and examined this morning.  Taking over care today.  Doing well, alert and oriented and able to answer all questions.  States he feels generalized weak but his shortness of breath is stable.  Denies any fever or chills.  No cough or nausea/vomiting.    Pertinent positives as noted in HPI/subjective.  All other systems were reviewed and are negative.      Objective      Vitals:   Temp:  [97.5 °F (36.4 °C)-100.8 °F (38.2 °C)] 98 °F (36.7 °C)  Heart Rate:  [69-82] 70  Resp:  [16-25] 19  BP: (101-173)/(53-88) 121/74  Flow (L/min):  [1-4] 2    Physical Exam:    General: Awake, alert, elderly male, lying in bed, NAD  Eyes: PERRL, EOMI, conjunctivae are clear  Cardiovascular: Regular rate and rhythm, no murmurs  Respiratory: Clear to auscultation bilaterally, no wheezing or rales, unlabored breathing  Abdomen: Soft, nontender, positive bowel sounds, no guarding  Neurologic: A&O, CN grossly intact, moves all extremities spontaneously  Musculoskeletal: Generalized weakness, no other gross deformities  Skin: Warm, dry, intact         Result Review    Result Review:  I have personally reviewed the results from the time of this admission to 2023 09:34 EDT and agree with these findings:  [x]  Laboratory  [x]  Microbiology  [x]  Radiology  [x]  EKG/Telemetry   [x]  Cardiology/Vascular   []  Pathology  [x]  Old records  []  Other:    Wounds (last 24 hours)       LDA Wound       Row Name 23 0852 23 1614          Wound 23 coccyx    Wound - Properties Group Placement Date: 23  -EV Placement Time:   -EV Location: coccyx  -EV    Wound Image -- Images linked: 1  -TP     Dressing Appearance dry;intact  -AW --     Care,  Wound cleansed with;soap and water  -AW --     Dressing Care dressing changed  -AW --     Retired Wound - Properties Group Placement Date: 09/01/23  -EV Placement Time: 1731 -EV Location: coccyx  -EV    Retired Wound - Properties Group Date first assessed: 09/01/23  -EV Time first assessed: 1731 -EV Location: coccyx  -EV              User Key  (r) = Recorded By, (t) = Taken By, (c) = Cosigned By      Initials Name Provider Type    EV Sintia De La O LPN Licensed Nurse    Mariam Zamorano LPN Licensed Nurse    Kim Purvis LPN Licensed Nurse                      Assessment & Plan      Brief Patient Summary:  Jermaine Black is a 89 y.o. male with a history of COPD, CHF with unknown EF, Parkinson's Disease, Hypothyroidism, CAD, Hyperlipidemia, GERD, BPH, and HTN who came to the ER after a fall on the morning of admission. Patient states that his legs gave out and he fell onto his knees but did not hit his head. He further states he has had a cough for the past 3 weeks but no sputum production. In the ER patient noted with COVID-19 with atypical pneumonia as well as possible CHF exacerbation with elevated BNP.  Hypoxic requiring 2 to 3 L nasal cannula, started on dexamethasone and Lasix and pulmonology also consulted.      albuterol sulfate HFA, 2 puff, Inhalation, 4x Daily - RT  budesonide, 0.5 mg, Nebulization, BID - RT  carbidopa-levodopa ER, 1 tablet, Oral, TID  cefTRIAXone, 1,000 mg, Intravenous, Q24H  dexAMETHasone, 6 mg, Oral, Daily With Breakfast  doxycycline, 100 mg, Intravenous, Q12H  enoxaparin, 40 mg, Subcutaneous, Q24H  erythromycin, 1 application , Both Eyes, Nightly  finasteride, 5 mg, Oral, Daily  furosemide, 40 mg, Intravenous, Q12H  isosorbide mononitrate, 60 mg, Oral, Daily  levothyroxine, 75 mcg, Oral, Daily With Breakfast  metoprolol succinate XL, 50 mg, Oral, Daily  rosuvastatin, 10 mg, Oral, Daily  tamsulosin, 0.4 mg, Oral, Daily       Pharmacy to Dose enoxaparin (LOVENOX),           I have utilized all available, immediate resources to obtain, update, or review the patient's current medications including all prescriptions, over-the-counter products, herbals, cannabis/cannabidiol products, and vitamin.mineral/dietary (nutritional) supplements.    Active Hospital Problems:  Active Hospital Problems    Diagnosis     **COVID-19     COVID-19 virus detected      Plan:     Acute hypoxemic respiratory failure  COVID-19 infection with atypical pneumonia  -Chest x-ray findings noted  -Continue bronchodilators and dexamethasone  -Requiring 2-4 L NC, wean down to room air as tolerated  -Encourage spirometry  -Pulmonology following    Acute CHF exacerbation  -Unknown EF, proBNP significantly elevated on admission  -Echo ordered to evaluate EF  -Patient on home Lasix, continue IV Lasix to diurese and monitor I's and O's  -Due to advanced age we will treat conservatively for now pending echo results    Elevated D-dimer  -Likely secondary to COVID, less likely VTE  -CTA chest ordered to rule out PE  -Monitor    Parkinson's disease  Deconditioning  -Continue home meds  -PT/OT    Hypertension  Hyperlipidemia  -Blood pressure controlled  -Continue home meds as tolerated, monitor    Hypothyroidism  GERD  BPH  -Continue home meds as tolerated, monitor    DVT prophylaxis  -Lovenox    CODE STATUS:    Medical Intervention Limits: NO intubation (DNI)  Level Of Support Discussed With: Health Care Surrogate  Code Status (Patient has no pulse and is not breathing): No CPR (Do Not Attempt to Resuscitate)  Medical Interventions (Patient has pulse or is breathing): Limited Support      Disposition: Pending improvement, may need placement.    Electronically signed by James Allison DO, 09/20/23, 09:34 EDT.  [facility] Hospitalist Team      Part of this note may be an electronic transcription/translation of spoken language to printed text using the Dragon Dictation System.

## 2023-09-20 NOTE — PLAN OF CARE
Problem: Fall Injury Risk  Goal: Absence of Fall and Fall-Related Injury  Outcome: Ongoing, Progressing  Intervention: Identify and Manage Contributors  Recent Flowsheet Documentation  Taken 9/20/2023 1300 by Kim Figueroa LPN  Medication Review/Management: medications reviewed  Taken 9/20/2023 0852 by Kim Figueroa LPN  Medication Review/Management: medications reviewed  Intervention: Promote Injury-Free Environment  Recent Flowsheet Documentation  Taken 9/20/2023 1300 by Kim Figueroa LPN  Safety Promotion/Fall Prevention: safety round/check completed  Taken 9/20/2023 0852 by Kim Figueroa LPN  Safety Promotion/Fall Prevention: safety round/check completed     Problem: Skin Injury Risk Increased  Goal: Skin Health and Integrity  Outcome: Ongoing, Progressing  Intervention: Optimize Skin Protection  Recent Flowsheet Documentation  Taken 9/20/2023 0852 by Kim Figueroa LPN  Head of Bed (HOB) Positioning: HOB elevated     Problem: Breathing Pattern Ineffective  Goal: Effective Breathing Pattern  Outcome: Ongoing, Progressing  Intervention: Promote Improved Breathing Pattern  Recent Flowsheet Documentation  Taken 9/20/2023 0852 by Kim Figueroa LPN  Head of Bed (HOB) Positioning: HOB elevated   Goal Outcome Evaluation:         Patient has been a/ox4, converted into afib see EKG to confirm. Cardio consulted, care continues

## 2023-09-20 NOTE — DISCHARGE PLACEMENT REQUEST
"Jermaine Black (89 y.o. Male)       Date of Birth   11/30/1926    Social Security Number       Address   [address]  [place] [address]    Home Phone   [phone]    MRN   [id number]       Congregation   None    Marital Status                               Admission Date   9/19/23    Admission Type   Emergency    Admitting Provider   Rolan Staley MD    Attending Provider   James Allison DO    Department, Room/Bed   [facility] 2D, 252/1       Discharge Date       Discharge Disposition       Discharge Destination                                 Attending Provider: James Allison DO    Allergies: No Known Allergies    Isolation: Enh Drop/Con   Infection: COVID (confirmed) (09/19/23)   Code Status: No CPR    Ht: 167.6 cm (66\")   Wt: 71.9 kg (158 lb 8.2 oz)    Admission Cmt: None   Principal Problem: COVID-19 [U07.1]                   Active Insurance as of 9/19/2023       Primary Coverage       Payor Plan Insurance Group Employer/Plan Group    AETNA MEDICARE REPLACEMENT AETNA MEDICARE REPLACEMENT [id number]       Payor Plan Address Payor Plan Phone Number Payor Plan Fax Number Effective Dates    [address] [phone]  1/1/2022 - None Entered    [facility] [id number]         Subscriber Name Subscriber Birth Date Member ID       JERMAINE BLACK 11/30/1926 [id number]                     Emergency Contacts        (Rel.) Home Phone Work Phone Mobile Phone    Wayne-Rachel ValenzuelaJUSTIN (Daughter) -- -- [phone]    Gerri Walker (Daughter) -- -- [phone]    Skylar Rutledge (Daughter) [phone] -- --              "

## 2023-09-20 NOTE — CASE MANAGEMENT/SOCIAL WORK
Social Work Assessment  [facility]     Patient Name: Jermaine Black  MRN: [id number]  Today's Date: 9/20/2023    Admit Date: 9/19/2023     Discharge Plan       Row Name 09/20/23 1419       Plan    Plan 1. [facility] pending acceptance 2. [facility] pending acceptance; Needs Ins. auth; Transport TBD. [facility] Approved      MANUEL Calixto, MSW    Phone: [phone]  Fax: [phone]  Email: [email or web address].

## 2023-09-20 NOTE — PROGRESS NOTES
Daily Progress Note          Assessment    Hypoxemia  COVID-19  Atypical pneumonia  COPD  CHF  BPH  HTN  Parkinson's disease   Hypothyroidism  GERD  CAD          PLAN:  Oxygen supplement and titration to maintain saturation 90-95%: Currently requiring 2 L per nasal cannula  Antibiotics: Rocephin and discontinue doxycycline  Remdesivir is not needed at this point since the COVID pneumonia is mild  Dexamethasone  Bronchodilators  Inhaled corticosteroids  Awaiting Echo   Watch INOS  Speech therapy evaluated him and recommended puréed diet with aspiration precautions  Incentive spirometer  Electrolytes/ glycemic control  Thyroid hormone replacement  BP control  Crestor  DVT  prophylaxis-Lovenox            LOS: 1 day     Subjective     Patient had a fever yesterday 100.8, patient reports mild cough and shortness of breath    Objective     Vital signs for last 24 hours:  Vitals:    09/20/23 0819 09/20/23 0824 09/20/23 1040 09/20/23 1044   BP:       BP Location:       Patient Position:       Pulse: 70 70 74 77   Resp: 17 19 23 23   Temp:       TempSrc:       SpO2: 100% 100% 97% 96%   Weight:       Height:           Intake/Output last 3 shifts:  I/O last 3 completed shifts:  In: 240 [P.O.:240]  Out: 1600 [Urine:1600]  Intake/Output this shift:  I/O this shift:  In: 240 [P.O.:240]  Out: -       Radiology  Imaging Results (Last 24 Hours)       ** No results found for the last 24 hours. **            Labs:  Results from last 7 days   Lab Units 09/20/23  0041   WBC 10*3/mm3 7.00   HEMOGLOBIN g/dL 12.4*   HEMATOCRIT % 37.5   PLATELETS 10*3/mm3 137*     Results from last 7 days   Lab Units 09/20/23  0041   SODIUM mmol/L 140   POTASSIUM mmol/L 3.5   CHLORIDE mmol/L 99   CO2 mmol/L 30.0*   BUN mg/dL 24*   CREATININE mg/dL 1.01   CALCIUM mg/dL 8.5   GLUCOSE mg/dL 178*             Results from last 7 days   Lab Units 09/19/23 2011 09/19/23  0931 09/19/23  0729   HSTROP T ng/L 116* 66* 76*                           Meds:    SCHEDULE  albuterol sulfate HFA, 2 puff, Inhalation, 4x Daily - RT  budesonide, 0.5 mg, Nebulization, BID - RT  carbidopa-levodopa ER, 1 tablet, Oral, TID  cefTRIAXone, 1,000 mg, Intravenous, Q24H  dexAMETHasone, 6 mg, Oral, Daily With Breakfast  doxycycline, 100 mg, Intravenous, Q12H  enoxaparin, 40 mg, Subcutaneous, Q24H  erythromycin, 1 application , Both Eyes, Nightly  finasteride, 5 mg, Oral, Daily  furosemide, 40 mg, Intravenous, Q12H  isosorbide mononitrate, 60 mg, Oral, Daily  levothyroxine, 75 mcg, Oral, Daily With Breakfast  metoprolol succinate XL, 50 mg, Oral, Daily  rosuvastatin, 10 mg, Oral, Daily  tamsulosin, 0.4 mg, Oral, Daily      Infusions  Pharmacy to Dose enoxaparin (LOVENOX),       PRNs    acetaminophen    benzonatate    hydrALAZINE    nitroglycerin    ondansetron    Pharmacy to Dose enoxaparin (LOVENOX)    Physical Exam:  General Appearance:  Alert   HEENT:  Normocephalic, without obvious abnormality, mild erythema and dryness in conjunctiva,   nares normal, no drainage     Neck:  Supple, symmetrical, trachea midline.   Lungs /Chest wall:   Mild bilateral basal rhonchi, respirations unlabored, symmetrical wall movement.     Heart:  Regular rate and rhythm, S1 S2 normal  Abdomen: Soft, non-tender, no masses, no organomegaly.    Extremities: No edema, no clubbing or cyanosis     ROS  Constitutional: Negative for chills, fever and positive for malaise/fatigue.   HENT: Negative.    Eyes: Negative.    Cardiovascular: Negative.    Respiratory: Positive for cough and shortness of breath.    Skin: Negative.    Musculoskeletal: Negative.    Gastrointestinal: Negative.    Genitourinary: Negative.    Neurological: Generalized weakness      I reviewed the recent clinical results    Part of this note may be an electronic transcription/translation of spoken language to printed text using the Dragon Dictation System.

## 2023-09-20 NOTE — THERAPY EVALUATION
Patient Name: Jermaine Black  : 1926    MRN: [id number]                              Today's Date: 2023       Admit Date: 2023    Visit Dx:     ICD-10-CM ICD-9-CM   1. Generalized weakness  R53.1 780.79   2. Fall, initial encounter  W19.XXXA E888.9   3. COVID  U07.1 079.89   4. Elevated troponin  R77.8 790.6   5. Acute on chronic congestive heart failure, unspecified heart failure type  I50.9 428.0     Patient Active Problem List   Diagnosis    Syncope    Parkinson's disease    HTN (hypertension)    HLD (hyperlipidemia)    CAD (coronary artery disease)    CHF (congestive heart failure)    Dehydration    COVID-19 virus detected    COVID-19     History reviewed. No pertinent past medical history.  History reviewed. No pertinent surgical history.   General Information       Row Name 23 0909          General Information    Existing Precautions/Restrictions oxygen therapy device and L/min;fall;other (see comments)  visual deficit, bilateral [place]  -     Barriers to Rehab medically complex;visual deficit;hearing deficit  -       Row Name 23 0909          Living Environment    People in Home spouse  they have HH aide tue/thurs for several hours. Pt states with certainty this visit that the HH aide only assists with chores. Not with ADL. Pt uses RW, has W/C, and is baseline mod (I) for ADL.  -       Row Name 2309          Cognition    Orientation Status (Cognition) oriented x 4;verbal cues/prompts needed for orientation  -       Row Name 2309          Safety Issues, Functional Mobility    Safety Issues Affecting Function (Mobility) insight into deficits/self-awareness;judgment;problem-solving;awareness of need for assistance;safety precaution awareness;sequencing abilities  -     Impairments Affecting Function (Mobility) balance;cognition;grasp;endurance/activity tolerance;shortness of breath;strength;visual/perceptual  -               User Key  (r) = Recorded By,  (t) = Taken By, (c) = Cosigned By      Initials Name Provider Type    Mikayla Sahu OT Occupational Therapist                     Mobility/ADL's       Row Name 09/20/23 0912          Bed Mobility    Comment, (Bed Mobility) pt declines bed mobility. RN reports pt appears anxious regarding SOA. Pt stated he had no pain or discomfort at rest in bed and perhaps just wanted to keep it that way. Agreed to bed level ROM & MMT.  -       Row Name 09/20/23 0912          Activities of Daily Living    BADL Assessment/Intervention grooming  -       Row Name 09/20/23 0912          Self-Feeding Assessment/Training    [place] Level (Feeding) feeding skills;maximum assist (25% patient effort)  -     Comment, (Feeding) aide fed pt this am  -       Row Name 09/20/23 0912          Grooming Assessment/Training    [place] Level (Grooming) wash face, hands;dependent (less than 25% patient effort)  -     Comment, (Grooming) OT assisted pt to wash face and clean some discharge from eyes, clean glasses, and don glasses, though pt reported the trifocals did not increase his vision. Pt reports he manages at home with low vision by knowing where everything is and keeping things in place.  -               User Key  (r) = Recorded By, (t) = Taken By, (c) = Cosigned By      Initials Name Provider Type    Mikayla Sahu OT Occupational Therapist                   Obj/Interventions       Row Name 09/20/23 0915          Vision Assessment/Intervention    Visual Impairment/Limitations visual/perceptual impairments present  -     Vision Assessment Comment lt eye sees shadows and light, rt eye has some vision. Pt can tell how many fingers held up with some uncertainty 2' in front of his face.  -       Row Name 09/20/23 0915          Range of Motion Comprehensive    Comment, General Range of Motion Parkinson's Dz so likely some trunk stiffness and ataxia with walking and mvmt. PROM WNL in bed. AROM Shld flex 50% abena. but  needs to raise one arm at a time to achieve 50% flexion.  -       Row Name 09/20/23 0915          Strength Comprehensive (MMT)    Comment, General Manual Muscle Testing (MMT) Assessment shld 2/5;  2+/5  -               User Key  (r) = Recorded By, (t) = Taken By, (c) = Cosigned By      Initials Name Provider Type     Mikayla Mcneill, OT Occupational Therapist                   Goals/Plan       Row Name 09/20/23 0923          Bed Mobility Goal 1 (OT)    Activity/Assistive Device (Bed Mobility Goal 1, OT) bed mobility activities, all  -     [place] Level/Cues Needed (Bed Mobility Goal 1, OT) minimum assist (75% or more patient effort)  -     Time Frame (Bed Mobility Goal 1, OT) 2 weeks  -       Row Name 09/20/23 0923          Transfer Goal 1 (OT)    Activity/Assistive Device (Transfer Goal 1, OT) transfers, all;walker, rolling  -     [place] Level/Cues Needed (Transfer Goal 1, OT) minimum assist (75% or more patient effort);verbal cues required;nonverbal cues (demo/gesture) required  -     Time Frame (Transfer Goal 1, OT) 2 weeks  -       Row Name 09/20/23 0923          Grooming Goal 1 (OT)    Activity/Device (Grooming Goal 1, OT) grooming skills, all  -     [place] (Grooming Goal 1, OT) verbal cues required;nonverbal cues (demo/gesture) required;contact guard required  -     Time Frame (Grooming Goal 1, OT) 2 weeks  -       Row Name 09/20/23 0923          Self-Feeding Goal 1 (OT)    Activity/Device (Self-Feeding Goal 1, OT) self-feeding skills, all  -     [place] Level/Cues Needed (Self-Feeding Goal 1, OT) set-up required;verbal cues required  -     Time Frame (Self-Feeding Goal 1, OT) 2 weeks  -       Row Name 09/20/23 0923          Therapy Assessment/Plan (OT)    Planned Therapy Interventions (OT) activity tolerance training;adaptive equipment training;BADL retraining;cognitive/visual perception retraining;neuromuscular control/coordination  retraining;occupation/activity based interventions;orthotic fabrication/fitting/training;patient/caregiver education/training;transfer/mobility retraining  -               User Key  (r) = Recorded By, (t) = Taken By, (c) = Cosigned By      Initials Name Provider Type     Mikayla Mcneill, SMITH Occupational Therapist                   Clinical Impression       Row Name 09/20/23 0917          Pain Assessment    Pretreatment Pain Rating 0/10 - no pain  -     Posttreatment Pain Rating 0/10 - no pain  -       Row Name 09/20/23 0917          Plan of Care Review    Plan of Care Reviewed With patient  -     Progress no change  -     Outcome Evaluation 89 y.o. male with a history of COPD, CHF, Parkinson's Disease, Hypothyroidism, CAD, Hyperlipidemia, GERD, BPH, and HTN who came to the ER after a fall at home this morning. Patient states that his legs gave out and he fell onto his knees but did not hit his head. He further states he has had a cough for the past 3 weeks but no sputum production. In the ER patient noted with COVID-19 and Atypical pneumonia and admitted for further treatment. Pt was admitted with syncope about a month ago and was found to have had SDH. he went to acute rehab for a short stay and had been back home for 3-4 days. He reports he had not been able to bathe himself at home but was managing other ADL and walking w/ RW. He does own a w/c. He appears very weak this morning and prefers to stay in bed due to SOA. He needed some assist this am to self feed. His chronic low vision complicates his mobility and functioning in an unfamiliar environment. OT recommends SNF for rehab at d/c.  -       Row Name 09/20/23 0917          Therapy Assessment/Plan (OT)    Rehab Potential (OT) good, to achieve stated therapy goals  -     Criteria for Skilled Therapeutic Interventions Met (OT) skilled treatment is necessary  -     Therapy Frequency (OT) 3 times/wk  -     Predicted Duration of Therapy  Intervention (OT) until d/c  -       Row Name 09/20/23 0917          Therapy Plan Review/Discharge Plan (OT)    Anticipated Discharge Disposition (OT) skilled nursing facility  -       Row Name 09/20/23 0917          Vital Signs    O2 Delivery Pre Treatment supplemental O2  -     Pre Patient Position Supine  -       Row Name 09/20/23 0917          Positioning and Restraints    Pre-Treatment Position in bed  -     Post Treatment Position bed  -     In Bed fowlers;call light within reach;encouraged to call for assist;exit alarm on  -               User Key  (r) = Recorded By, (t) = Taken By, (c) = Cosigned By      Initials Name Provider Type    Mikayla Sahu OT Occupational Therapist                   Outcome Measures       Row Name 09/20/23 0924          Modified [place] Scale    Pre-Stroke Modified [place] Scale 2 - Slight disability.  Unable to carry out all previous activities but able to look after own affairs without assistance.  -     Modified [place] Scale 4 - Moderately severe disability.  Unable to walk without assistance, and unable to attend to own bodily needs without assistance.  -       Row Name 09/20/23 0924          Functional Assessment    Outcome Measure Options Modified [place]  -               User Key  (r) = Recorded By, (t) = Taken By, (c) = Cosigned By      Initials Name Provider Type    Mikayla Sahu OT Occupational Therapist                    Occupational Therapy Education       Title: PT OT SLP Therapies (Done)       Topic: Occupational Therapy (Done)       Point: ADL training (Done)       Description:   Instruct learner(s) on proper safety adaptation and remediation techniques during self care or transfers.   Instruct in proper use of assistive devices.                  Learning Progress Summary             Patient Acceptance, E, VU by AW at 9/20/2023 0903    Acceptance, E, VU by MG at 9/19/2023 1610                         Point: Home exercise program (Done)        Description:   Instruct learner(s) on appropriate technique for monitoring, assisting and/or progressing therapeutic exercises/activities.                  Learning Progress Summary             Patient Acceptance, E, VU by  at 9/20/2023 0903    Acceptance, E, VU by MG at 9/19/2023 1610                         Point: Precautions (Done)       Description:   Instruct learner(s) on prescribed precautions during self-care and functional transfers.                  Learning Progress Summary             Patient Acceptance, E, VU,NR by  at 9/20/2023 0925    Acceptance, E, VU by AW at 9/20/2023 0903    Acceptance, E, VU by MG at 9/19/2023 1610                         Point: Body mechanics (Done)       Description:   Instruct learner(s) on proper positioning and spine alignment during self-care, functional mobility activities and/or exercises.                  Learning Progress Summary             Patient Acceptance, E, VU,NR by  at 9/20/2023 0925    Acceptance, E, VU by AW at 9/20/2023 0903    Acceptance, E, VU by MG at 9/19/2023 1610                                         User Key       Initials Effective Dates Name Provider Type Discipline     06/16/21 -  Mikayla Mcneill OT Occupational Therapist OT     06/16/21 -  Delphine Shankar RN Registered Nurse Nurse     08/15/22 -  Kim Figueroa LPN Licensed Nurse Nurse                  OT Recommendation and Plan  Planned Therapy Interventions (OT): activity tolerance training, adaptive equipment training, BADL retraining, cognitive/visual perception retraining, neuromuscular control/coordination retraining, occupation/activity based interventions, orthotic fabrication/fitting/training, patient/caregiver education/training, transfer/mobility retraining  Therapy Frequency (OT): 3 times/wk  Plan of Care Review  Plan of Care Reviewed With: patient  Progress: no change  Outcome Evaluation: 89 y.o. male with a history of COPD, CHF, Parkinson's Disease,  Hypothyroidism, CAD, Hyperlipidemia, GERD, BPH, and HTN who came to the ER after a fall at home this morning. Patient states that his legs gave out and he fell onto his knees but did not hit his head. He further states he has had a cough for the past 3 weeks but no sputum production. In the ER patient noted with COVID-19 and Atypical pneumonia and admitted for further treatment. Pt was admitted with syncope about a month ago and was found to have had SDH. he went to acute rehab for a short stay and had been back home for 3-4 days. He reports he had not been able to bathe himself at home but was managing other ADL and walking w/ RW. He does own a w/c. He appears very weak this morning and prefers to stay in bed due to SOA. He needed some assist this am to self feed. His chronic low vision complicates his mobility and functioning in an unfamiliar environment. OT recommends SNF for rehab at d/c.     Time Calculation:         Time Calculation- OT       Row Name 09/20/23 0925             Time Calculation-     OT Start Time 0850  -      OT Stop Time 0904  -      OT Time Calculation (min) 14 min  -      Total Timed Code Minutes- OT 0 minute(s)  -      OT Received On 09/20/23  -      OT - Next Appointment 09/22/23  -      OT Goal Re-Cert Due Date 10/04/23  -                User Key  (r) = Recorded By, (t) = Taken By, (c) = Cosigned By      Initials Name Provider Type     Mikayla Mcneill OT Occupational Therapist                  Therapy Charges for Today       Code Description Service Date Service Provider Modifiers Qty    [id number]  OT EVAL MOD COMPLEXITY 3 9/20/2023 Mikayla Mcneill OT GO 1                 Mikayla Mcneill OT  9/20/2023

## 2023-09-20 NOTE — PLAN OF CARE
Goal Outcome Evaluation:  Plan of Care Reviewed With: patient        Progress: no change  Outcome Evaluation: 89 y.o. male with a history of COPD, CHF, Parkinson's Disease, Hypothyroidism, CAD, Hyperlipidemia, GERD, BPH, and HTN who came to the ER after a fall at home this morning. Patient states that his legs gave out and he fell onto his knees but did not hit his head. He further states he has had a cough for the past 3 weeks but no sputum production. In the ER patient noted with COVID-19 and Atypical pneumonia and admitted for further treatment. Pt was admitted with syncope about a month ago and was found to have had SDH. he went to acute rehab for a short stay and had been back home for 3-4 days. He reports he had not been able to bathe himself at home but was managing other ADL and walking w/ RW. He does own a w/c. He appears very weak this morning and prefers to stay in bed due to SOA. He needed some assist this am to self feed. His chronic low vision complicates his mobility and functioning in an unfamiliar environment. OT recommends SNF for rehab at d/c.      Anticipated Discharge Disposition (OT): skilled nursing facility

## 2023-09-20 NOTE — THERAPY EVALUATION
Patient Name: Jermaine Black  : 1926    MRN: [id number]                              Today's Date: 2023       Admit Date: 2023    Visit Dx:     ICD-10-CM ICD-9-CM   1. Generalized weakness  R53.1 780.79   2. Fall, initial encounter  W19.XXXA E888.9   3. COVID  U07.1 079.89   4. Elevated troponin  R77.8 790.6   5. Acute on chronic congestive heart failure, unspecified heart failure type  I50.9 428.0     Patient Active Problem List   Diagnosis    Syncope    Parkinson's disease    HTN (hypertension)    HLD (hyperlipidemia)    CAD (coronary artery disease)    CHF (congestive heart failure)    Dehydration    COVID-19 virus detected    COVID-19     History reviewed. No pertinent past medical history.  History reviewed. No pertinent surgical history.   General Information       Row Name 23 1215          Physical Therapy Time and Intention    Document Type evaluation  -AO     Mode of Treatment physical therapy  -AO       Row Name 23 1215          General Information    Patient Profile Reviewed yes  -AO     Prior Level of Function --  Previously MOD I w/ household mobility w/ SPC (has RW but reports he wasn't using)  -AO     Existing Precautions/Restrictions oxygen therapy device and L/min;fall;other (see comments)  visual deficit, B [place]  -AO     Barriers to Rehab medically complex;previous functional deficit;visual deficit;hearing deficit  -AO       Row Name 23 1215          Living Environment    People in Home spouse  Pt lives w/ wife, has HH aide 2x/week to assist w/ housework  -AO       Row Name 23 1215          Home Main Entrance    Number of Stairs, Main Entrance two  -AO     Stair Railings, Main Entrance railings on both sides of stairs  -AO       Row Name 23 1215          Stairs Within Home, Primary    Number of Stairs, Within Home, Primary none  -AO       Row Name 23 1215          Cognition    Orientation Status (Cognition) oriented x 4  -AO       Row Name  09/20/23 1215          Safety Issues, Functional Mobility    Safety Issues Affecting Function (Mobility) awareness of need for assistance;insight into deficits/self-awareness;problem-solving;sequencing abilities  -AO     Impairments Affecting Function (Mobility) balance;endurance/activity tolerance;shortness of breath;strength;visual/perceptual  -AO               User Key  (r) = Recorded By, (t) = Taken By, (c) = Cosigned By      Initials Name Provider Type    Emperatriz Escobar, PT Physical Therapist                   Mobility       Row Name 09/20/23 1215          Bed Mobility    Bed Mobility supine-sit  -AO     Supine-Sit [place] (Bed Mobility) minimum assist (75% patient effort)  -AO     Assistive Device (Bed Mobility) bed rails  -AO       Row Name 09/20/23 1215          Bed-Chair Transfer    Bed-Chair [place] (Transfers) moderate assist (50% patient effort)  -AO     Comment, (Bed-Chair Transfer) Stand pivot ist transfer from EOB to recliner  -AO       Row Name 09/20/23 1215          Sit-Stand Transfer    Sit-Stand [place] (Transfers) minimum assist (75% patient effort)  -AO     Assistive Device (Sit-Stand Transfers) --  LUE HHA  -AO       Row Name 09/20/23 1215          Gait/Stairs (Locomotion)    [place] Level (Gait) unable to assess  -AO               User Key  (r) = Recorded By, (t) = Taken By, (c) = Cosigned By      Initials Name Provider Type    Emperatriz Escobar PT Physical Therapist                   Obj/Interventions       Row Name 09/20/23 1215          Range of Motion Comprehensive    General Range of Motion bilateral lower extremity ROM WFL  -AO       Row Name 09/20/23 1215          Strength Comprehensive (MMT)    Comment, General Manual Muscle Testing (MMT) Assessment B hip flexion: 4-/5, B knee flexion/extension: 4/5  -AO       Row Name 09/20/23 1215          Balance    Balance Assessment sitting static balance;sitting dynamic balance;sit to stand dynamic balance;standing  static balance;standing dynamic balance  -AO     Static Sitting Balance standby assist  -AO     Dynamic Sitting Balance contact guard  -AO     Position, Sitting Balance unsupported;sitting edge of bed  -AO     Sit to Stand Dynamic Balance minimal assist  -AO     Static Standing Balance minimal assist  -AO     Dynamic Standing Balance moderate assist  -AO       Row Name 09/20/23 1215          Sensory Assessment (Somatosensory)    Sensory Assessment (Somatosensory) sensation intact  -AO               User Key  (r) = Recorded By, (t) = Taken By, (c) = Cosigned By      Initials Name Provider Type    AO Emperatriz Sanchez, PT Physical Therapist                   Goals/Plan       Row Name 09/20/23 1215          Bed Mobility Goal 1 (PT)    Activity/Assistive Device (Bed Mobility Goal 1, PT) bed mobility activities, all  -AO     [place] Level/Cues Needed (Bed Mobility Goal 1, PT) supervision required  -AO     Time Frame (Bed Mobility Goal 1, PT) long term goal (LTG);2 weeks  -AO     Progress/Outcomes (Bed Mobility Goal 1, PT) goal not met  -AO       Row Name 09/20/23 1215          Transfer Goal 1 (PT)    Activity/Assistive Device (Transfer Goal 1, PT) transfers, all;walker, rolling  -AO     [place] Level/Cues Needed (Transfer Goal 1, PT) contact guard required  -AO     Time Frame (Transfer Goal 1, PT) long term goal (LTG);2 weeks  -AO     Progress/Outcome (Transfer Goal 1, PT) goal not met  -AO       Row Name 09/20/23 1215          Gait Training Goal 1 (PT)    Activity/Assistive Device (Gait Training Goal 1, PT) gait (walking locomotion);walker, rolling  -AO     [place] Level (Gait Training Goal 1, PT) minimum assist (75% or more patient effort)  -AO     Distance (Gait Training Goal 1, PT) 50 ft  -AO     Time Frame (Gait Training Goal 1, PT) long term goal (LTG);2 weeks  -AO     Progress/Outcome (Gait Training Goal 1, PT) goal not met  -AO       Row Name 09/20/23 1215          Therapy Assessment/Plan (PT)     "Planned Therapy Interventions (PT) balance training;bed mobility training;gait training;home exercise program;neuromuscular re-education;patient/family education;transfer training;strengthening  -AO               User Key  (r) = Recorded By, (t) = Taken By, (c) = Cosigned By      Initials Name Provider Type    Emperatriz Escobar, PT Physical Therapist                   Clinical Impression       Row Name 09/20/23 1304          Pain    Pretreatment Pain Rating 0/10 - no pain  -AO     Posttreatment Pain Rating 0/10 - no pain  -AO       Row Name 09/20/23 1302          Plan of Care Review    Plan of Care Reviewed With patient  -AO     Progress no change  -AO     Outcome Evaluation Pt is a 89 y/o M who presented to [facility] after fall at home w/ reports of \"knees giving out\" (denies striking head) and w/ reports of ~3 week cough. Pt found to have Covid-19 w/ atypical PNA. Pt was recently admitted ~1 month ago for syncopal fall w/ SDH and had gone to [facility] for rehab, had been home only 3-4 days. Pt also w/ hx of COPD, CHF, Parkinson's disease, CAD, HLD, GERD, BPH, and HTN. At baseline, pt lives w/ wife in [facility] w/ 2 NEREYDA (B HR) and was MOD I w/ SPC (has RW but reports he wasn't using, also has w/c), though reports 5+ falls over the past several months (has HHA 2 days/week to assist w/ housework). During eval, pt requires CGA for supine to sit transfer, MIN A for sit to stand transfer w/ posterior LOB requiring return to sitting EOB, and required MOD A for stand pivot sit transfer from EOB to recliner. Pt w/ Sp02 >95% on 2L throughout session. Pt far from baseline and at high risk for falls, recommending SNF at d/c and plan to see 5x/week at [facility] for progression of mobility. PPE: gloves, N95, gown, eye protection  -AO       Row Name 09/20/23 9049          Therapy Assessment/Plan (PT)    Rehab Potential (PT) fair, will monitor progress closely  -AO     Criteria for Skilled Interventions Met (PT) yes;meets criteria;skilled treatment " is necessary  -AO     Therapy Frequency (PT) 5 times/wk  -AO     Predicted Duration of Therapy Intervention (PT) until d/c  -AO       Row Name 09/20/23 1309          Vital Signs    Recovery Time vitals stable and WNL on 2L 02  -AO       Row Name 09/20/23 1309          Positioning and Restraints    Pre-Treatment Position in bed  -AO     Post Treatment Position chair  -AO     In Chair notified nsg;reclined;call light within reach;encouraged to call for assist;exit alarm on  -AO               User Key  (r) = Recorded By, (t) = Taken By, (c) = Cosigned By      Initials Name Provider Type    Emperatriz Escobar, PT Physical Therapist                   Outcome Measures       Row Name 09/20/23 1215          How much help from another person do you currently need...    Turning from your back to your side while in flat bed without using bedrails? 3  -AO     Moving from lying on back to sitting on the side of a flat bed without bedrails? 3  -AO     Moving to and from a bed to a chair (including a wheelchair)? 2  -AO     Standing up from a chair using your arms (e.g., wheelchair, bedside chair)? 3  -AO     Climbing 3-5 steps with a railing? 1  -AO     To walk in hospital room? 1  -AO     AM-PAC 6 Clicks Score (PT) 13  -AO     Highest level of mobility 4 --> Transferred to chair/commode  -AO       Row Name 09/20/23 0924          Modified [place] Scale    Pre-Stroke Modified [place] Scale 2 - Slight disability.  Unable to carry out all previous activities but able to look after own affairs without assistance.  -     Modified [place] Scale 4 - Moderately severe disability.  Unable to walk without assistance, and unable to attend to own bodily needs without assistance.  -       Row Name 09/20/23 0924          Functional Assessment    Outcome Measure Options Modified [place]  -               User Key  (r) = Recorded By, (t) = Taken By, (c) = Cosigned By      Initials Name Provider Type    Mikayla Sauh, OT Occupational  "Therapist    Emperatriz Escobar, PT Physical Therapist                                 Physical Therapy Education       Title: PT OT SLP Therapies (Done)       Topic: Physical Therapy (Done)       Point: Mobility training (Done)       Learning Progress Summary             Patient Acceptance, E,TB, VU by AO at 9/20/2023 1323    Acceptance, E, VU by AW at 9/20/2023 0903    Acceptance, E, VU by MG at 9/19/2023 1610                         Point: Body mechanics (Done)       Learning Progress Summary             Patient Acceptance, E,TB, VU by AO at 9/20/2023 1323    Acceptance, E, VU by AW at 9/20/2023 0903    Acceptance, E, VU by MG at 9/19/2023 1610                         Point: Precautions (Done)       Learning Progress Summary             Patient Acceptance, E, VU by AW at 9/20/2023 0903    Acceptance, E, VU by MG at 9/19/2023 1610                                         User Key       Initials Effective Dates Name Provider Type Discipline    SRIDEVI 06/16/21 -  Emperatriz Sanchez, PT Physical Therapist PT    MG 06/16/21 -  Delphine Shankar RN Registered Nurse Nurse     08/15/22 -  Kim Figueroa LPN Licensed Nurse Nurse                  PT Recommendation and Plan  Planned Therapy Interventions (PT): balance training, bed mobility training, gait training, home exercise program, neuromuscular re-education, patient/family education, transfer training, strengthening  Plan of Care Reviewed With: patient  Progress: no change  Outcome Evaluation: Pt is a 89 y/o M who presented to [facility] after fall at home w/ reports of \"knees giving out\" (denies striking head) and w/ reports of ~3 week cough. Pt found to have Covid-19 w/ atypical PNA. Pt was recently admitted ~1 month ago for syncopal fall w/ SDH and had gone to [facility] for rehab, had been home only 3-4 days. Pt also w/ hx of COPD, CHF, Parkinson's disease, CAD, HLD, GERD, BPH, and HTN. At baseline, pt lives w/ wife in [facility] w/ 2 NEREYDA (B HR) and was MOD I w/ SPC (has RW but " reports he wasn't using, also has w/c), though reports 5+ falls over the past several months (has HHA 2 days/week to assist w/ housework). During eval, pt requires CGA for supine to sit transfer, MIN A for sit to stand transfer w/ posterior LOB requiring return to sitting EOB, and required MOD A for stand pivot sit transfer from EOB to recliner. Pt w/ Sp02 >95% on 2L throughout session. Pt far from baseline and at high risk for falls, recommending SNF at d/c and plan to see 5x/week at [facility] for progression of mobility. PPE: gloves, N95, gown, eye protection     Time Calculation:   PT Evaluation Complexity  History, PT Evaluation Complexity: 3 or more personal factors and/or comorbidities  Examination of Body Systems (PT Eval Complexity): total of 3 or more elements  Clinical Presentation (PT Evaluation Complexity): evolving  Clinical Decision Making (PT Evaluation Complexity): moderate complexity  Overall Complexity (PT Evaluation Complexity): moderate complexity     PT Charges       Row Name 09/20/23 1324             Time Calculation    Start Time 1215  -AO      Stop Time 1241  -AO      Time Calculation (min) 26 min  -AO      PT Received On 09/20/23  -AO      PT - Next Appointment 09/21/23  -AO      PT Goal Re-Cert Due Date 10/04/23  -AO         Time Calculation- PT    Total Timed Code Minutes- PT 0 minute(s)  -AO                User Key  (r) = Recorded By, (t) = Taken By, (c) = Cosigned By      Initials Name Provider Type    AO Emperatriz Sanchez, PT Physical Therapist                  Therapy Charges for Today       Code Description Service Date Service Provider Modifiers Qty    [id number]  PT EVAL MOD COMPLEXITY 4 9/20/2023 Emperatriz Sanchez, PT GP 1            PT G-Codes  Outcome Measure Options: Modified [place]  AM-PAC 6 Clicks Score (PT): 13  Modified [place] Scale: 4 - Moderately severe disability.  Unable to walk without assistance, and unable to attend to own bodily needs without assistance.  PT Discharge  Summary  Anticipated Discharge Disposition (PT): skilled nursing facility    Emperatriz Sanchez, PT  9/20/2023

## 2023-09-21 LAB
ANION GAP SERPL CALCULATED.3IONS-SCNC: 7 MMOL/L (ref 5–15)
BASOPHILS # BLD AUTO: 0 10*3/MM3 (ref 0–0.2)
BASOPHILS NFR BLD AUTO: 0.1 % (ref 0–1.5)
BUN SERPL-MCNC: 27 MG/DL (ref 8–23)
BUN/CREAT SERPL: 25.7 (ref 7–25)
CALCIUM SPEC-SCNC: 7.9 MG/DL (ref 8.2–9.6)
CHLORIDE SERPL-SCNC: 97 MMOL/L (ref 98–107)
CO2 SERPL-SCNC: 36 MMOL/L (ref 22–29)
CREAT SERPL-MCNC: 1.05 MG/DL (ref 0.76–1.27)
DEPRECATED RDW RBC AUTO: 52.1 FL (ref 37–54)
EGFRCR SERPLBLD CKD-EPI 2021: 65 ML/MIN/1.73
EOSINOPHIL # BLD AUTO: 0 10*3/MM3 (ref 0–0.4)
EOSINOPHIL NFR BLD AUTO: 0 % (ref 0.3–6.2)
ERYTHROCYTE [DISTWIDTH] IN BLOOD BY AUTOMATED COUNT: 14.8 % (ref 12.3–15.4)
GLUCOSE SERPL-MCNC: 97 MG/DL (ref 65–99)
HCT VFR BLD AUTO: 35.7 % (ref 37.5–51)
HGB BLD-MCNC: 12.2 G/DL (ref 13–17.7)
LYMPHOCYTES # BLD AUTO: 0.7 10*3/MM3 (ref 0.7–3.1)
LYMPHOCYTES NFR BLD AUTO: 9.9 % (ref 19.6–45.3)
MCH RBC QN AUTO: 33.2 PG (ref 26.6–33)
MCHC RBC AUTO-ENTMCNC: 34.2 G/DL (ref 31.5–35.7)
MCV RBC AUTO: 97.1 FL (ref 79–97)
MONOCYTES # BLD AUTO: 1 10*3/MM3 (ref 0.1–0.9)
MONOCYTES NFR BLD AUTO: 13.8 % (ref 5–12)
NEUTROPHILS NFR BLD AUTO: 5.5 10*3/MM3 (ref 1.7–7)
NEUTROPHILS NFR BLD AUTO: 76.2 % (ref 42.7–76)
NRBC BLD AUTO-RTO: 0 /100 WBC (ref 0–0.2)
PLATELET # BLD AUTO: 125 10*3/MM3 (ref 140–450)
PMV BLD AUTO: 9.2 FL (ref 6–12)
POTASSIUM SERPL-SCNC: 3.3 MMOL/L (ref 3.5–5.2)
POTASSIUM SERPL-SCNC: 4.3 MMOL/L (ref 3.5–5.2)
RBC # BLD AUTO: 3.67 10*6/MM3 (ref 4.14–5.8)
SODIUM SERPL-SCNC: 140 MMOL/L (ref 136–145)
WBC NRBC COR # BLD: 7.2 10*3/MM3 (ref 3.4–10.8)

## 2023-09-21 PROCEDURE — 99222 1ST HOSP IP/OBS MODERATE 55: CPT | Performed by: INTERNAL MEDICINE

## 2023-09-21 PROCEDURE — 92526 ORAL FUNCTION THERAPY: CPT

## 2023-09-21 PROCEDURE — 97116 GAIT TRAINING THERAPY: CPT

## 2023-09-21 PROCEDURE — 63710000001 DEXAMETHASONE PER 0.25 MG: Performed by: INTERNAL MEDICINE

## 2023-09-21 PROCEDURE — 80048 BASIC METABOLIC PNL TOTAL CA: CPT | Performed by: INTERNAL MEDICINE

## 2023-09-21 PROCEDURE — 84132 ASSAY OF SERUM POTASSIUM: CPT | Performed by: INTERNAL MEDICINE

## 2023-09-21 PROCEDURE — 85025 COMPLETE CBC W/AUTO DIFF WBC: CPT | Performed by: INTERNAL MEDICINE

## 2023-09-21 PROCEDURE — 97530 THERAPEUTIC ACTIVITIES: CPT

## 2023-09-21 PROCEDURE — 97112 NEUROMUSCULAR REEDUCATION: CPT

## 2023-09-21 PROCEDURE — 25010000002 FUROSEMIDE PER 20 MG: Performed by: NURSE PRACTITIONER

## 2023-09-21 PROCEDURE — 25010000002 ENOXAPARIN PER 10 MG

## 2023-09-21 RX ORDER — CEFUROXIME AXETIL 500 MG/1
500 TABLET ORAL EVERY 12 HOURS SCHEDULED
Status: COMPLETED | OUTPATIENT
Start: 2023-09-21 | End: 2023-09-23

## 2023-09-21 RX ORDER — NYSTATIN 100000 U/G
1 CREAM TOPICAL EVERY 12 HOURS SCHEDULED
Status: DISCONTINUED | OUTPATIENT
Start: 2023-09-21 | End: 2023-09-26 | Stop reason: HOSPADM

## 2023-09-21 RX ORDER — POTASSIUM CHLORIDE 20 MEQ/1
40 TABLET, EXTENDED RELEASE ORAL EVERY 4 HOURS
Status: COMPLETED | OUTPATIENT
Start: 2023-09-21 | End: 2023-09-21

## 2023-09-21 RX ADMIN — TAMSULOSIN HYDROCHLORIDE 0.4 MG: 0.4 CAPSULE ORAL at 07:22

## 2023-09-21 RX ADMIN — BENZONATATE 100 MG: 100 CAPSULE ORAL at 04:58

## 2023-09-21 RX ADMIN — ENOXAPARIN SODIUM 40 MG: 40 INJECTION, SOLUTION SUBCUTANEOUS at 15:43

## 2023-09-21 RX ADMIN — DEXAMETHASONE 6 MG: 4 TABLET ORAL at 07:21

## 2023-09-21 RX ADMIN — CARBIDOPA AND LEVODOPA 1 TABLET: 25; 100 TABLET, EXTENDED RELEASE ORAL at 15:43

## 2023-09-21 RX ADMIN — METOPROLOL SUCCINATE 50 MG: 50 TABLET, EXTENDED RELEASE ORAL at 07:22

## 2023-09-21 RX ADMIN — FINASTERIDE 5 MG: 5 TABLET, FILM COATED ORAL at 07:21

## 2023-09-21 RX ADMIN — CEFUROXIME AXETIL 500 MG: 500 TABLET ORAL at 13:19

## 2023-09-21 RX ADMIN — LEVOTHYROXINE SODIUM 75 MCG: 0.07 TABLET ORAL at 05:00

## 2023-09-21 RX ADMIN — ROSUVASTATIN 10 MG: 10 TABLET, FILM COATED ORAL at 07:21

## 2023-09-21 RX ADMIN — ISOSORBIDE MONONITRATE 60 MG: 60 TABLET, EXTENDED RELEASE ORAL at 07:21

## 2023-09-21 RX ADMIN — NYSTATIN 1 APPLICATION: 100000 CREAM TOPICAL at 20:35

## 2023-09-21 RX ADMIN — Medication 5000 UNITS: at 07:22

## 2023-09-21 RX ADMIN — FUROSEMIDE 40 MG: 10 INJECTION, SOLUTION INTRAMUSCULAR; INTRAVENOUS at 04:58

## 2023-09-21 RX ADMIN — POTASSIUM CHLORIDE 40 MEQ: 1500 TABLET, EXTENDED RELEASE ORAL at 07:21

## 2023-09-21 RX ADMIN — NYSTATIN 1 APPLICATION: 100000 CREAM TOPICAL at 09:28

## 2023-09-21 RX ADMIN — FUROSEMIDE 40 MG: 40 TABLET ORAL at 11:32

## 2023-09-21 RX ADMIN — POTASSIUM CHLORIDE 40 MEQ: 1500 TABLET, EXTENDED RELEASE ORAL at 04:58

## 2023-09-21 RX ADMIN — CARBIDOPA AND LEVODOPA 1 TABLET: 25; 100 TABLET, EXTENDED RELEASE ORAL at 07:22

## 2023-09-21 RX ADMIN — CARBIDOPA AND LEVODOPA 1 TABLET: 25; 100 TABLET, EXTENDED RELEASE ORAL at 20:35

## 2023-09-21 RX ADMIN — CEFUROXIME AXETIL 500 MG: 500 TABLET ORAL at 20:35

## 2023-09-21 NOTE — PLAN OF CARE
Goal Outcome Evaluation:           Progress: improving        Outcome Evaluation: Assumed care of pt around 2300; pt currently resting in bed w/o c/o pain; pt is currently on 2L NC; pt is on IV Rocephin, IV lasix, PO decadron, lovenox subQ, and erythromycin drops; pt appears to be atrially paced; pt on pureed diet/thin liquids per ST (per report takes pills one @ a time whole); transthoracic ECHO showed a L EF of 46-50%; MASD on coccyx and reddened heels; VSS overnight on 2L NC          DC Plan: from home w/ wife; PT/OT recommended SNF; 1st choice- [facility]  2nd choice- [facility]       Barriers: IV Rocephin, IV lasix             Problem: Fall Injury Risk  Goal: Absence of Fall and Fall-Related Injury  9/21/2023 0412 by David Leon, RN  Outcome: Ongoing, Progressing  9/21/2023 0412 by David Leon RN  Outcome: Ongoing, Progressing  Intervention: Identify and Manage Contributors  Recent Flowsheet Documentation  Taken 9/21/2023 0000 by David Leon RN  Medication Review/Management: medications reviewed  Self-Care Promotion: independence encouraged  Intervention: Promote Injury-Free Environment  Recent Flowsheet Documentation  Taken 9/21/2023 0000 by David Leon RN  Safety Promotion/Fall Prevention:   assistive device/personal items within reach   clutter free environment maintained   fall prevention program maintained   lighting adjusted   room organization consistent   safety round/check completed   nonskid shoes/slippers when out of bed     Problem: Skin Injury Risk Increased  Goal: Skin Health and Integrity  9/21/2023 0412 by David Leon, RN  Outcome: Ongoing, Progressing  9/21/2023 0412 by David Leon, RN  Outcome: Ongoing, Progressing  Intervention: Optimize Skin Protection  Recent Flowsheet Documentation  Taken 9/21/2023 0000 by David Leon, RN  Pressure Reduction Techniques:   frequent weight shift encouraged   weight shift assistance provided   positioned off wounds    pressure points protected  Head of Bed (HOB) Positioning: [facility] elevated  Pressure Reduction Devices:   heel offloading device utilized   positioning supports utilized   pressure-redistributing mattress utilized  Skin Protection:   adhesive use limited   incontinence pads utilized   silicone foam dressing in place   tubing/devices free from skin contact     Problem: Breathing Pattern Ineffective  Goal: Effective Breathing Pattern  9/21/2023 0412 by David Leon, RN  Outcome: Ongoing, Progressing  9/21/2023 0412 by David Leon, RN  Outcome: Ongoing, Progressing  Intervention: Promote Improved Breathing Pattern  Recent Flowsheet Documentation  Taken 9/21/2023 0000 by David Leon, RN  Head of Bed (HOB) Positioning: HOB elevated     Problem: Adult Inpatient Plan of Care  Goal: Plan of Care Review  Outcome: Ongoing, Progressing  Flowsheets (Taken 9/21/2023 0412)  Progress: improving  Outcome Evaluation:   Assumed care of pt around 2300   pt currently resting in bed w/o c/o pain   pt is currently on 2L NC   pt is on IV Rocephin, IV lasix, PO decadron, lovenox subQ, and erythromycin drops   pt appears to be atrially paced   pt on pureed diet/thin liquids per ST (per report takes pills one @ a time whole)   transthoracic ECHO showed a L EF of 46-50%   MASD on coccyx and reddened heels   VSS overnight on 2L NC          DC Plan: from home w/ wife   PT/OT recommended SNF   1st choice- [facility]  2nd choice- [facility]       Barriers: IV Rocephin, IV lasix  Goal: Patient-Specific Goal (Individualized)  Outcome: Ongoing, Progressing  Goal: Absence of Hospital-Acquired Illness or Injury  Outcome: Ongoing, Progressing  Intervention: Identify and Manage Fall Risk  Recent Flowsheet Documentation  Taken 9/21/2023 0000 by David Leon, RN  Safety Promotion/Fall Prevention:   assistive device/personal items within reach   clutter free environment maintained   fall prevention program maintained   lighting adjusted    room organization consistent   safety round/check completed   nonskid shoes/slippers when out of bed  Intervention: Prevent Skin Injury  Recent Flowsheet Documentation  Taken 9/21/2023 0000 by David Leon RN  Body Position: position changed independently  Skin Protection:   adhesive use limited   incontinence pads utilized   silicone foam dressing in place   tubing/devices free from skin contact  Intervention: Prevent and Manage VTE (Venous Thromboembolism) Risk  Recent Flowsheet Documentation  Taken 9/21/2023 0000 by David Leon RN  VTE Prevention/Management: (on lovenox subQ) other (see comments)  Intervention: Prevent Infection  Recent Flowsheet Documentation  Taken 9/21/2023 0000 by David Leon, RN  Infection Prevention:   equipment surfaces disinfected   hand hygiene promoted   personal protective equipment utilized   rest/sleep promoted   single patient room provided  Goal: Optimal Comfort and Wellbeing  Outcome: Ongoing, Progressing  Goal: Readiness for Transition of Care  Outcome: Ongoing, Progressing

## 2023-09-21 NOTE — PROGRESS NOTES
[facility] Medicine Services   Daily Progress Note      Patient Name: Jermaine Black  : 1926  MRN: [id number]  Primary Care Physician:  Provider, No Known  Date of admission: 2023      Subjective      Chief Complaint: Shortness of breath, fall    Patient seen and examined this morning.  Doing well overall, breathing continues to improve.  No other complaints.  Had A-fib on EKG yesterday, cardiology has been consulted.    Pertinent positives as noted in HPI/subjective.  All other systems were reviewed and are negative.      Objective      Vitals:   Temp:  [97.2 °F (36.2 °C)-98.5 °F (36.9 °C)] 98.4 °F (36.9 °C)  Heart Rate:  [70-86] 76  Resp:  [15-23] 16  BP: ()/(50-85) 131/73  Flow (L/min):  [2] 2    Physical Exam:    General: Awake, alert, elderly male, lying in bed, NAD  Eyes: PERRL, EOMI, conjunctivae are clear  Cardiovascular: Regular rate and rhythm, no murmurs  Respiratory: Clear to auscultation bilaterally, no wheezing or rales, unlabored breathing  Abdomen: Soft, nontender, positive bowel sounds, no guarding  Neurologic: A&O, CN grossly intact, moves all extremities spontaneously  Musculoskeletal: Generalized weakness, no other gross deformities  Skin: Warm, dry, intact         Result Review    Result Review:  I have personally reviewed the results from the time of this admission to 2023 09:47 EDT and agree with these findings:  [x]  Laboratory  [x]  Microbiology  [x]  Radiology  [x]  EKG/Telemetry   [x]  Cardiology/Vascular   []  Pathology  []  Old records  []  Other:    Wounds (last 24 hours)       LDA Wound       Row Name 23 0800 23 0000 23       [REMOVED] Wound 23 coccyx    Wound - Properties Group Placement Date: 23  -EV Placement Time:   -EV Location: coccyx  -EV Removal Date: 23  -AB Removal Time: 7  -AB    Pressure Injury Stage -- -- O  masd  -BB    Dressing Appearance -- -- dry;intact  -BB    Closure Adhesive  bandage  -SE Adhesive bandage  -AB Adhesive bandage  -BB    Base pink;dry  -SE -- --    Retired Wound - Properties Group Placement Date: 09/01/23  -EV Placement Time: 1731 -EV Location: coccyx  -EV Removal Date: 09/21/23  -AB Removal Time: 0007  -AB    Retired Wound - Properties Group Date first assessed: 09/01/23  -EV Time first assessed: 1731  -EV Location: coccyx  -EV Resolution Date: 09/21/23  -AB Resolution Time: 0007  -AB      Row Name 09/20/23 1300             [REMOVED] Wound 09/01/23 1731 coccyx    Wound - Properties Group Placement Date: 09/01/23  -EV Placement Time: 1731 -EV Location: coccyx  -EV Removal Date: 09/21/23  -AB Removal Time: 0007  -AB    Dressing Appearance intact;dry  -AW      Retired Wound - Properties Group Placement Date: 09/01/23  -EV Placement Time: 1731  -EV Location: coccyx  -EV Removal Date: 09/21/23  -AB Removal Time: 0007  -AB    Retired Wound - Properties Group Date first assessed: 09/01/23  -EV Time first assessed: 1731  -EV Location: coccyx  -EV Resolution Date: 09/21/23  -AB Resolution Time: 0007  -AB              User Key  (r) = Recorded By, (t) = Taken By, (c) = Cosigned By      Initials Name Provider Type    Mary Harrison RN Registered Nurse    Sintia Thomas LPN Licensed Nurse    Kim Purvis LPN Licensed Nurse    David Freire RN Registered Nurse    Mike Virk RN Registered Nurse                      Assessment & Plan      Brief Patient Summary:  Jermaine Black is a 89 y.o. male with a history of COPD, CHF with unknown EF, Parkinson's Disease, Hypothyroidism, CAD, Hyperlipidemia, GERD, BPH, and HTN who came to the ER after a fall on the morning of admission. Patient states that his legs gave out and he fell onto his knees but did not hit his head. He further states he has had a cough for the past 3 weeks but no sputum production. In the ER patient noted with COVID-19 with atypical pneumonia as well as possible CHF exacerbation with  elevated BNP.  Hypoxic requiring 2 to 3 L nasal cannula, started on dexamethasone and Lasix and pulmonology also consulted.      albuterol sulfate HFA, 2 puff, Inhalation, 4x Daily - RT  budesonide, 0.5 mg, Nebulization, BID - RT  carbidopa-levodopa ER, 1 tablet, Oral, TID  cefTRIAXone, 1,000 mg, Intravenous, Q24H  dexAMETHasone, 6 mg, Oral, Daily With Breakfast  enoxaparin, 40 mg, Subcutaneous, Q24H  erythromycin, 1 application , Both Eyes, Nightly  finasteride, 5 mg, Oral, Daily  furosemide, 40 mg, Oral, Daily  isosorbide mononitrate, 60 mg, Oral, Daily  levothyroxine, 75 mcg, Oral, Daily With Breakfast  metoprolol succinate XL, 50 mg, Oral, Daily  nystatin, 1 application , Topical, Q12H  rosuvastatin, 10 mg, Oral, Daily  tamsulosin, 0.4 mg, Oral, Daily  vitamin D3, 5,000 Units, Oral, Daily       Pharmacy to Dose enoxaparin (LOVENOX),          I have utilized all available, immediate resources to obtain, update, or review the patient's current medications including all prescriptions, over-the-counter products, herbals, cannabis/cannabidiol products, and vitamin.mineral/dietary (nutritional) supplements.    Active Hospital Problems:  Active Hospital Problems    Diagnosis     **COVID-19     COVID-19 virus detected      Plan:     Acute hypoxemic respiratory failure, improved  COVID-19 infection with atypical pneumonia  -Chest x-ray findings noted  -Continue bronchodilators and dexamethasone  -Improved and weaned down to room air now  -Encourage spirometry  -Pulmonology following    Acute on chronic HFrEF  Severe aortic stenosis  -Echo this admission shows EF of 40 to 45% and severe aortic stenosis noted  -Medical management now for diuresis with Lasix as tolerated, monitor I's and O's  -Due to advanced age, patient likely not a candidate for any aggressive therapy for aortic stenosis  -Cardiology consulted     PAF  SSS s/p pacemaker  -Diagnosed with A-fib during this admission, rate well controlled  -Continue  beta-blocker and monitor on telemetry  -Patient very high fall risk, not a candidate for full anticoagulation with also recent history of questionable SAH noted during previous admission  -Cardiology consulted    Elevated D-dimer  -Likely secondary to COVID, less likely VTE  -CTA chest negative for PE  -Monitor    Parkinson's disease  Deconditioning  -Continue home meds  -PT/OT    Hypertension  Hyperlipidemia  -Blood pressure controlled  -Continue home meds as tolerated, monitor    Hypothyroidism  GERD  BPH  -Continue home meds as tolerated, monitor    DVT prophylaxis  -Lovenox    CODE STATUS:    Medical Intervention Limits: NO intubation (DNI)  Level Of Support Discussed With: Health Care Surrogate  Code Status (Patient has no pulse and is not breathing): No CPR (Do Not Attempt to Resuscitate)  Medical Interventions (Patient has pulse or is breathing): Limited Support      Disposition: Pending improvement, needs SNF placement.    Electronically signed by James Allison DO, 09/21/23, 09:47 EDT.  [facility] Hospitalist Team      Part of this note may be an electronic transcription/translation of spoken language to printed text using the Dragon Dictation System.

## 2023-09-21 NOTE — THERAPY TREATMENT NOTE
Acute Care - Speech Language Pathology   Swallow Treatment Note  Rai     Patient Name: Jermaine Black  : 1926  MRN: [id number]  Today's Date: 2023               Admit Date: 2023    Visit Dx:     ICD-10-CM ICD-9-CM   1. Generalized weakness  R53.1 780.79   2. Fall, initial encounter  W19.XXXA E888.9   3. COVID  U07.1 079.89   4. Elevated troponin  R77.8 790.6   5. Acute on chronic congestive heart failure, unspecified heart failure type  I50.9 428.0     Patient Active Problem List   Diagnosis    Syncope    Parkinson's disease    HTN (hypertension)    HLD (hyperlipidemia)    CAD (coronary artery disease)    CHF (congestive heart failure)    Dehydration    COVID-19 virus detected    COVID-19     History reviewed. No pertinent past medical history.  History reviewed. No pertinent surgical history.    SLP Recommendation and Plan                                                                                         SWALLOW EVALUATION (last 72 hours)       SLP Adult Swallow Evaluation       Row Name 23 1500                   Rehab Evaluation    Document Type evaluation  -LF        Subjective Information no complaints  -LF        Patient Observations alert;cooperative  -LF        Patient Effort good  -LF        Symptoms Noted During/After Treatment fatigue  -LF           General Information    Patient Profile Reviewed yes  -LF        Pertinent History Of Current Problem Pt is a 89 y/o male  -LF        Current Method of Nutrition NPO  -LF        Precautions/Limitations, Hearing hearing impairment, bilaterally  -LF        Prior Level of Function-Swallowing unknown  -LF        Plans/Goals Discussed with patient  -LF           Oral Motor Structure and Function    Dentition Assessment upper dentures/partial in place;lower dentures/partial in place  -LF        Secretion Management WNL/WFL  -LF        Mucosal Quality dry  -LF           Oral Musculature and Cranial Nerve Assessment    Oral Motor  General Assessment generalized oral motor weakness  -           General Eating/Swallowing Observations    Respiratory Support Currently in Use nasal cannula  -        O2 Liters 3L  -LF        Eating/Swallowing Skills fed by SLP  -        Positioning During Eating upright 90 degree;upright in bed  -        Utensils Used spoon;straw  -LF        Consistencies Trialed thin liquids;pureed;mixed consistency  -           Clinical Swallow Eval    Clinical Swallow Evaluation Summary Pt given trials of thin liquids by straw, puree, and mechanical soft to clinically assess swallow function. All trials fed by SLP d/t oral confusion noted when attempting to self feed. Adequate labial seal w/ no anterior loss. Extended mastication w/ mechanical soft. Disorganized oral transit w/ puree. Cough observed prior to PO. No coughing or other clinical s/s of aspiration observed during PO trials. No oral pocketing/residue. D/t oral confusion and oral phase deficits, recommendpt initiate a puree and thin liquid diet. He should be upright at 90 degrees for all PO, take small bites/sips, and have feeding assistance during meals d/t oral confusion. ST will continue to follow to ensure diet tolerance and make further recs as indicated.  -           SLP Evaluation Clinical Impression    SLP Swallowing Diagnosis oral dysphagia;R/O pharyngeal dysphagia  -        Functional Impact risk of aspiration/pneumonia;risk of malnutrition  -        Rehab Potential/Prognosis, Swallowing good, to achieve stated therapy goals  -        Swallow Criteria for Skilled Therapeutic Interventions Met demonstrates skilled criteria  -           Recommendations    Therapy Frequency (Swallow) PRN  -        Predicted Duration Therapy Intervention (Days) until discharge  -        SLP Diet Recommendation puree;thin liquids  -        Recommended Diagnostics reassess via clinical swallow evaluation  -        Recommended Precautions and Strategies  upright posture during/after eating;small bites of food and sips of liquid;general aspiration precautions;fatigue precautions;assist with feeding  -LF        Oral Care Recommendations Oral Care BID/PRN;Oral Care before breakfast, after meals and PRN;Denture Care  -LF        SLP Rec. for Method of Medication Administration meds whole;meds crushed;with puree  -LF        Monitor for Signs of Aspiration notify SLP if any concerns  -LF           Swallow Goals (SLP)    Swallow LTGs Swallow Long Term Goal (free text)  -LF        Swallow STGs diet tolerance goal selection (SLP)  -LF        Diet Tolerance Goal Selection (SLP) Swallow Short Term Goal 1;Swallow Short Term Goal 2  -LF           (LTG) Swallow    (LTG) Swallow The patient will maximize swallow function for least restrictive po diet, exhibiting no complications associated with dysphagia, adequate po intake, and demonstrating independent use of safe swallow compensations.  -LF        Time Frame (Swallow Long Term Goal) by discharge  -LF        Progress/Outcomes (Swallow Long Term Goal) new goal  -LF           (STG) Swallow 1    (STG) Swallow 1 The patient will participate in a follow up assessment to determine safety and adequacy of recommended diet, independent use of safe swallow compensations, and additional goals/recommendations to follow  -LF        Time Frame (Swallow Short Term Goal 1) 1 week  -LF        Progress/Outcomes (Swallow Short Term Goal 1) new goal  -LF           (STG) Swallow 2    (STG) Swallow 2 Patient will participate in ongoing assessment of swallow, including reevaluation clinically and/or including instrumental assessment of swallow if indicated, to further assess swallow function and return to oral nutrition  -LF        Time Frame (Swallow Short Term Goal 2) 1 week  -LF        Progress/Outcomes (Swallow Short Term Goal 2) new goal  -LF                  User Key  (r) = Recorded By, (t) = Taken By, (c) = Cosigned By      Initials Name  Effective Dates    LF Skylar Garcia, SLP 06/16/21 -                     EDUCATION  The patient has been educated in the following areas:   Dysphagia (Swallowing Impairment) Modified Diet Instruction.        SLP GOALS       Row Name 09/21/23 1500       (LTG) Swallow    (LTG) Swallow The patient will maximize swallow function for least restrictive po diet, exhibiting no complications associated with dysphagia, adequate po intake, and demonstrating independent use of safe swallow compensations.  -PF    Time Frame (Swallow Long Term Goal) by discharge  -PF    Progress/Outcomes (Swallow Long Term Goal) new goal  -PF    Comment (Swallow Long Term Goal) see below  -PF       (STG) Swallow 1    (STG) Swallow 1 The patient will participate in a follow up assessment to determine safety and adequacy of recommended diet, independent use of safe swallow compensations, and additional goals/recommendations to follow  -PF    Time Frame (Swallow Short Term Goal 1) 1 week  -PF    Progress/Outcomes (Swallow Short Term Goal 1) new goal  -PF    Comment (Swallow Short Term Goal 1) Pt was seen for skilled dysphagia therapy to ensure tolerance and safety of recommended diet (puree and thin liquid). RN present and pt was seen at lunch. Pt positioned upright at 90 degree hip flexion in chair. RN reports improved alertness and orientation today. Pt accepted puree and STC trials x2. Feeding assist by nsg, who reports good PO intake since recs. Slightly prolonged but functional mastication on STC texture and no overt s/s of aspiration or respiratory distress noted on STC or thin liquid by straw. Advancing diet to mechanical ground/thin. ST will complete meal f/u to ensure tolerance to rec'd diet and advance diet if indicated.  -PF         (STG) Swallow 2    (STG) Swallow 2 Patient will participate in ongoing assessment of swallow, including reevaluation clinically and/or including instrumental assessment of swallow if indicated, to further  assess swallow function and return to oral nutrition  -PF    Time Frame (Swallow Short Term Goal 2) 1 week  -PF    Progress/Outcomes (Swallow Short Term Goal 2) new goal  -PF         User Key  (r) = Recorded By, (t) = Taken By, (c) = Cosigned By      Initials Name Provider Type    PF Fakto, [place], SLP Speech and Language Pathologist            MELLY Sequeira  9/21/2023

## 2023-09-21 NOTE — PLAN OF CARE
"Assessment: Jermaine Black presents with functional mobility impairments which indicate the need for skilled intervention. Tolerating session today without incident. He was AAOx4 and motivated to work w/ PT this treatment. Pt required Mod A x1 and verbal cues for using bed rail to transfer from supine to sit. Pt was able to sit on EOB for ~5 mins using BUEs for support. Pt required Min A x1 combined w/ max verbal cues and bed raised using RW for STS transfer. Pt reported no SOB or dizziness per standing. Pt required Min A x 1 w/ RW and verbal cues for directions for ambulation from bed to chair, distance of 4 ft. Pt demonstrated decreased endurance w/ OOB activity. Will continue to follow and progress as tolerated.      Plan/Recommendations:   Low Intensity Therapy recommended post-acute care - This is recommended as therapy feels this patient would require 2-3 visits per week. OP or HH would be the best option depending on patient's home bound status. Consider, if the patient has other  \"skilled\" needs such as wounds, IV antibiotics, etc. Combined with \"low intensity\" could also equate to a SNF. If patient is medically complex, consider LTAC.. Pt requires no DME at discharge.      Pt desires Skilled Rehab placement at discharge. Pt cooperative; agreeable to therapeutic recommendations and plan of care.                      "

## 2023-09-21 NOTE — CONSULTS
CARDIOLOGY CONSULT:    Jermaine Black  11/30/1926  male  [id number]      Referring Provider: Hospitalist  Reason for Consultation: Atrial fibrillation    Patient Care Team:  Provider, No Known as PCP - General    Chief complaint s/p fall and shortness of breath    Subjective .     History of present illness:  Jermaine Black is a 89 y.o. male with history of Parkinson's disease hypertension coronary artery disease hyperlipidemia presented to the hospital after a fall.  Patient was having some shortness of breath also.  He says he did not have any dizziness or syncopal episode but he felt that his legs gave out and fell on his knees but did not hurt his head.  No complaints of any chest pain.  No swelling of the feet.  In the ER patient was noted to have COVID-pneumonia after being positive for COVID.  Patient also has atrial fibrillation with controlled ventricular response.  Patient did not have this history before.  He is taking his medicines regularly.    Review of Systems   Constitutional: Negative for fever and malaise/fatigue.   HENT:  Negative for ear pain and nosebleeds.    Eyes:  Negative for blurred vision and double vision.   Cardiovascular:  Negative for chest pain, dyspnea on exertion and palpitations.   Respiratory:  Positive for cough and shortness of breath.    Skin:  Negative for rash.   Musculoskeletal:  Positive for falls. Negative for joint pain.   Gastrointestinal:  Negative for abdominal pain, nausea and vomiting.   Neurological:  Negative for focal weakness and headaches.   Psychiatric/Behavioral:  Negative for depression. The patient is not nervous/anxious.    All other systems reviewed and are negative.    History  History reviewed. No pertinent past medical history.    History reviewed. No pertinent surgical history.    History reviewed. No pertinent family history.    Social History     Tobacco Use    Smoking status: Never    Smokeless tobacco: Never   Vaping Use    Vaping Use: Never  used   Substance Use Topics    Alcohol use: Never    Drug use: Never        Medications Prior to Admission   Medication Sig Dispense Refill Last Dose    carbidopa-levodopa ER (SINEMET CR)  MG per tablet Take 1 tablet by mouth 3 (Three) Times a Day.       carboxymethylcellulose (REFRESH PLUS) 0.5 % solution Administer 1 drop to both eyes 2 (Two) Times a Day.       colesevelam (WELCHOL) 625 MG tablet Take 1 tablet by mouth 2 (Two) Times a Day With Meals.       dutasteride (AVODART) 0.5 MG capsule Take 1 capsule by mouth Daily.       erythromycin (ROMYCIN) 5 MG/GM ophthalmic ointment Administer 1 application  to both eyes Every Night.       furosemide (LASIX) 40 MG tablet Take 1 tablet by mouth Daily.       isosorbide mononitrate (IMDUR) 60 MG 24 hr tablet Take 1 tablet by mouth Daily.       levothyroxine (SYNTHROID, LEVOTHROID) 75 MCG tablet Take 1 tablet by mouth Daily.       metoprolol succinate XL (TOPROL-XL) 50 MG 24 hr tablet Take 1 tablet by mouth Daily.       nitroglycerin (NITROSTAT) 0.4 MG SL tablet Place 1 tablet under the tongue Every 5 (Five) Minutes As Needed for Chest Pain. Take no more than 3 doses in 15 minutes.       Pimavanserin Tartrate (Nuplazid) 34 MG capsule Take 1 capsule by mouth Every Night.       rosuvastatin (CRESTOR) 10 MG tablet Take 1 tablet by mouth Daily.       tamsulosin (FLOMAX) 0.4 MG capsule 24 hr capsule Take 1 capsule by mouth Daily.       potassium chloride 10 MEQ CR tablet Take 1 tablet by mouth Daily.          Allergies: Patient has no known allergies.    Scheduled Meds:albuterol sulfate HFA, 2 puff, Inhalation, 4x Daily - RT  budesonide, 0.5 mg, Nebulization, BID - RT  carbidopa-levodopa ER, 1 tablet, Oral, TID  cefuroxime, 500 mg, Oral, Q12H  dexAMETHasone, 6 mg, Oral, Daily With Breakfast  enoxaparin, 40 mg, Subcutaneous, Q24H  erythromycin, 1 application , Both Eyes, Nightly  finasteride, 5 mg, Oral, Daily  furosemide, 40 mg, Oral, Daily  isosorbide mononitrate, 60  "mg, Oral, Daily  levothyroxine, 75 mcg, Oral, Daily With Breakfast  metoprolol succinate XL, 50 mg, Oral, Daily  nystatin, 1 application , Topical, Q12H  rosuvastatin, 10 mg, Oral, Daily  tamsulosin, 0.4 mg, Oral, Daily  vitamin D3, 5,000 Units, Oral, Daily      Continuous Infusions:Pharmacy to Dose enoxaparin (LOVENOX),       PRN Meds:.  acetaminophen    benzonatate    Calcium Replacement - Follow Nurse / BPA Driven Protocol    hydrALAZINE    Magnesium Standard Dose Replacement - Follow Nurse / BPA Driven Protocol    nitroglycerin    ondansetron    Pharmacy to Dose enoxaparin (LOVENOX)    Phosphorus Replacement - Follow Nurse / BPA Driven Protocol    Potassium Replacement - Follow Nurse / BPA Driven Protocol    Objective     VITAL SIGNS  Vitals:    09/21/23 0500 09/21/23 0718 09/21/23 1243 09/21/23 1540   BP:  131/73  122/70   BP Location:  Right arm  Right arm   Patient Position:  Sitting  Lying   Pulse: 71 76 91 94   Resp: 18 16 16 16   Temp:  98.4 °F (36.9 °C) 98.2 °F (36.8 °C) 98 °F (36.7 °C)   TempSrc:  Oral Oral Oral   SpO2: 94% 94% 91% 93%   Weight:       Height:  167.6 cm (66\")         Flowsheet Rows      Flowsheet Row First Filed Value   Admission Height 167.6 cm (66\") Documented at 09/19/2023 0540   Admission Weight 72.6 kg (160 lb) Documented at 09/19/2023 0540             TELEMETRY: Atrial fibrillation with controlled ventricular response    Physical Exam:  Constitutional:       Appearance: Well-developed.   Eyes:      General: No scleral icterus.     Conjunctiva/sclera: Conjunctivae normal.      Pupils: Pupils are equal, round, and reactive to light.   HENT:      Head: Normocephalic and atraumatic.   Neck:      Vascular: No carotid bruit or JVD.   Pulmonary:      Effort: Pulmonary effort is normal.      Breath sounds: Normal breath sounds. No wheezing. No rales.   Cardiovascular:      Normal rate. Irregularly irregular rhythm.      Murmurs: There is a systolic murmur.   Pulses:     Intact distal " pulses.   Abdominal:      General: Bowel sounds are normal.      Palpations: Abdomen is soft.   Musculoskeletal: Normal range of motion.      Cervical back: Normal range of motion and neck supple. Skin:     General: Skin is warm and dry.      Findings: No rash.   Neurological:      Mental Status: Alert.      Comments: No focal deficits        Results Review:   I reviewed the patient's new clinical results.  Lab Results (last 24 hours)       Procedure Component Value Units Date/Time    Potassium [[id number]]  (Normal) Collected: 09/21/23 1346    Specimen: Blood Updated: 09/21/23 1501     Potassium 4.3 mmol/L     Basic Metabolic Panel [[id number]]  (Abnormal) Collected: 09/21/23 0226    Specimen: Blood Updated: 09/21/23 0322     Glucose 97 mg/dL      BUN 27 mg/dL      Creatinine 1.05 mg/dL      Sodium 140 mmol/L      Potassium 3.3 mmol/L      Chloride 97 mmol/L      CO2 36.0 mmol/L      Calcium 7.9 mg/dL      BUN/Creatinine Ratio 25.7     Anion Gap 7.0 mmol/L      eGFR 65.0 mL/min/1.73     Narrative:      GFR Normal >60  Chronic Kidney Disease <60  Kidney Failure <15    The GFR formula is only valid for adults with stable renal function between ages 18 and 70.    CBC & Differential [[id number]]  (Abnormal) Collected: 09/21/23 0226    Specimen: Blood Updated: 09/21/23 0249    Narrative:      The following orders were created for panel order CBC & Differential.  Procedure                               Abnormality         Status                     ---------                               -----------         ------                     CBC Auto Differential[[id number]]        Abnormal            Final result                 Please view results for these tests on the individual orders.    CBC Auto Differential [[id number]]  (Abnormal) Collected: 09/21/23 0226    Specimen: Blood Updated: 09/21/23 0249     WBC 7.20 10*3/mm3      RBC 3.67 10*6/mm3      Hemoglobin 12.2 g/dL      Hematocrit 35.7 %      MCV 97.1 fL      MCH 33.2 pg       MCHC 34.2 g/dL      RDW 14.8 %      RDW-SD 52.1 fl      MPV 9.2 fL      Platelets 125 10*3/mm3      Neutrophil % 76.2 %      Lymphocyte % 9.9 %      Monocyte % 13.8 %      Eosinophil % 0.0 %      Basophil % 0.1 %      Neutrophils, Absolute 5.50 10*3/mm3      Lymphocytes, Absolute 0.70 10*3/mm3      Monocytes, Absolute 1.00 10*3/mm3      Eosinophils, Absolute 0.00 10*3/mm3      Basophils, Absolute 0.00 10*3/mm3      nRBC 0.0 /100 WBC             Imaging Results (Last 24 Hours)       ** No results found for the last 24 hours. **            EKG      I personally viewed and interpreted the patient's EKG/Telemetry data:    ECHOCARDIOGRAM:  Results for orders placed during the hospital encounter of 09/19/23    Adult Transthoracic Echo Limited W/ Cont if Necessary Per Protocol    Interpretation Summary    Left ventricular ejection fraction appears to be 46 - 50%.    Severe aortic valve stenosis is present.         STRESS MYOVIEW:       CARDIAC CATHETERIZATION:    No results found for this or any previous visit.       OTHER:         MDM      Atrial fibrillation  Patient has new onset atrial fibrillation and is having some falls but no dizziness or syncope  Patient's rate is well controlled  Patient's family is not able to have discussed with him and he was not interested in anticoagulation but I will place him on aspirin at least.  Patient has high risk of falls secondary to Parkinson disease and his age.    Severe aortic stenosis  Patient had an echocardiogram which showed severe aortic stenosis with mild LV dysfunction EF of 45 to 50%    Congestive heart failure  Patient has congestive heart failure with midrange LV ejection fraction EF of 45 to 50% and is currently on beta-blockers  If patient's blood pressure is stable then I will put him on low-dose ACE inhibitors    Hyperlipidemia  Patient is currently on statins    Hypertension  Patient blood pressure currently stable on isosorbide and beta-blockers    Coronary  disease  Patient has history of coronary disease in the past and is followed by a cardiologist in the past but no documentation available but is currently stable without any angina and is on beta-blockers and nitrates and will be on aspirin also    History of Parkinson disease  Patient has history of Parkinson disease and is currently taking medicines and it is 1 reason also why he should not be on any anticoagulation  I discussed the patients findings and my recommendations with patient and nurse    Fili Colón MD  09/21/23  15:52 EDT

## 2023-09-21 NOTE — THERAPY TREATMENT NOTE
"Subjective: Pt agreeable to therapeutic plan of care.    Objective:     Bed mobility - Mod-A using bed rail  Transfers - Min-A and with rolling walker combined w/ verbal cues for STS  Ambulation - 4 feet Min-A and with rolling walker    Vitals: WNL    Pain: 0 VAS      Education: Provided education on the importance of mobility in the acute care setting, Verbal/Tactile Cues, Transfer Training, and Gait Training    Assessment: Jermaine Black presents with functional mobility impairments which indicate the need for skilled intervention. Tolerating session today without incident. He was AAOx4 and motivated to work w/ PT this treatment. Pt required Mod A x1 and verbal cues for using bed rail to transfer from supine to sit. Pt was able to sit on EOB for ~5 mins using BUEs for support. Pt required Min A x1 combined w/ max verbal cues and bed raised using RW for STS transfer. Pt reported no SOB or dizziness per standing. Pt required Min A x 1 w/ RW and verbal cues for directions for ambulation from bed to chair, distance of 4 ft. Pt demonstrated decreased endurance w/ OOB activity. Will continue to follow and progress as tolerated.     Plan/Recommendations:   Low Intensity Therapy recommended post-acute care - This is recommended as therapy feels this patient would require 2-3 visits per week. OP or HH would be the best option depending on patient's home bound status. Consider, if the patient has other  \"skilled\" needs such as wounds, IV antibiotics, etc. Combined with \"low intensity\" could also equate to a SNF. If patient is medically complex, consider LTAC.. Pt requires no DME at discharge.     Pt desires Skilled Rehab placement at discharge. Pt cooperative; agreeable to therapeutic recommendations and plan of care.         Basic Mobility 6-click:  Rollin = Total, A lot = 2, A little = 3; 4 = None  Supine>Sit:   1 = Total, A lot = 2, A little = 3; 4 = None   Sit>Stand with arms:  1 = Total, A lot = 2, A " little = 3; 4 = None  Bed>Chair:   1 = Total, A lot = 2, A little = 3; 4 = None  Ambulate in room:  1 = Total, A lot = 2, A little = 3; 4 = None  3-5 Steps with railin = Total, A lot = 2, A little = 3; 4 = None  Score: 16    Modified [place]: 4 = Moderately severe disability (Unable to attend to own bodily needs without assistance, and unable to walk unassisted)     Post-Tx Position: Up in Chair, Alarms activated, and Call light and personal items within reach  PPE: gloves, gown, eye protection, and N95

## 2023-09-21 NOTE — PROGRESS NOTES
"Daily Progress Note          Assessment    Hypoxemia  COVID-19  Atypical pneumonia  COPD  CHF  BPH  HTN  Parkinson's disease   Hypothyroidism  GERD  CAD   Results for orders placed during the hospital encounter of 09/19/23    Adult Transthoracic Echo Limited W/ Cont if Necessary Per Protocol    Interpretation Summary    Left ventricular ejection fraction appears to be 46 - 50%.    Severe aortic valve stenosis is present.       PLAN:  Respiratory status is improving and currently on RA, ok to transfer to SNF from pulmonary stand point.  Antibiotics: Rocephin changed to Ceftin  Remdesivir is not needed at this point since the COVID pneumonia is mild  Dexamethasone oral for 2 more days  Bronchodilators  Inhaled corticosteroids  Awaiting Echo   Watch INOS  Speech therapy evaluated him and recommended puréed diet with aspiration precautions  Incentive spirometer  Electrolytes/ glycemic control  Thyroid hormone replacement  BP control  Crestor  DVT  prophylaxis-Lovenox            LOS: 2 days     Subjective     Afebrile, patient reports no cough and less shortness of breath    Objective     Vital signs for last 24 hours:  Vitals:    09/21/23 0200 09/21/23 0402 09/21/23 0500 09/21/23 0718   BP:  141/81  131/73   BP Location:  Right arm  Right arm   Patient Position:  Lying  Sitting   Pulse: 70 74 71 76   Resp: 18 19 18 16   Temp:  97.2 °F (36.2 °C)  98.4 °F (36.9 °C)   TempSrc:  Axillary  Oral   SpO2: 100% 100% 94% 94%   Weight:  72.2 kg (159 lb 2.8 oz)     Height:    167.6 cm (66\")       Intake/Output last 3 shifts:  I/O last 3 completed shifts:  In: 900 [P.O.:900]  Out: 1810 [Urine:1810]  Intake/Output this shift:  I/O this shift:  In: 468 [P.O.:468]  Out: 900 [Urine:900]      Radiology  Imaging Results (Last 24 Hours)       Procedure Component Value Units Date/Time    CT Angiogram Chest Pulmonary Embolism [[id number]] Collected: 09/20/23 1219     Updated: 09/20/23 1228    Narrative:      CT ANGIOGRAM CHEST PULMONARY " EMBOLISM    Date of Exam: 9/20/2023 12:17 PM EDT    Indication: Pulmonary embolism (PE) suspected, high prob.    Comparison: Chest radiograph 9/19/2023    Technique: Axial CT images were obtained of the chest after the uneventful intravenous administration of iodinated contrast utilizing pulmonary embolism protocol.  Sagittal and coronal reconstructions were performed.  Automated exposure control and   iterative reconstruction methods were used.    Findings:  Visualized soft tissues of the lower neck without acute abnormality. Left-sided AICD noted. Cardiomegaly. Three-vessel coronary artery calcifications. Aortic valve calcifications are present. Trace pericardial effusion. Negative for pulmonary embolus.   Mild reflux of contrast from the right atrium into the hepatic veins which suggest right heart insufficiency. Extensive atherosclerotic calcifications of the aortic arch and descending thoracic aorta.    There are moderate bilateral pleural effusions with lower lobe dependent airspace disease likely compressive atelectasis. No pneumothorax. Mild smooth intralobular septal thickening at the lung bases with scattered groundglass opacities involving the   upper and lower lobes consistent with pulmonary edema.    Upper abdomen demonstrates large right hepatic lobe cyst measuring 7.8 cm. Mild adrenal thickening bilaterally which may relate to hyperplasia. No acute abnormality of the spleen or pancreas. Cyst at the upper pole left kidney measures 4.3 cm. No   aggressive osseous lesion. Large amount of stool in the visualized colon.      Impression:      Impression:  1. Negative for pulmonary embolus.  2. Cardiomegaly and pulmonary edema with moderate bilateral pleural effusions right greater than left.  3. Coronary artery and aortic valve calcifications.  4. Reflux of contrast into the hepatic veins suggesting right heart insufficiency.  5. Additional chronic findings above.        Electronically Signed: Farzad Moss  MD    9/20/2023 12:26 PM EDT    Workstation ID: [id number]            Labs:  Results from last 7 days   Lab Units 09/21/23  0226   WBC 10*3/mm3 7.20   HEMOGLOBIN g/dL 12.2*   HEMATOCRIT % 35.7*   PLATELETS 10*3/mm3 125*       Results from last 7 days   Lab Units 09/21/23  0226   SODIUM mmol/L 140   POTASSIUM mmol/L 3.3*   CHLORIDE mmol/L 97*   CO2 mmol/L 36.0*   BUN mg/dL 27*   CREATININE mg/dL 1.05   CALCIUM mg/dL 7.9*   GLUCOSE mg/dL 97               Results from last 7 days   Lab Units 09/19/23 2011 09/19/23 0931 09/19/23  0729   HSTROP T ng/L 116* 66* 76*                             Meds:   SCHEDULE  albuterol sulfate HFA, 2 puff, Inhalation, 4x Daily - RT  budesonide, 0.5 mg, Nebulization, BID - RT  carbidopa-levodopa ER, 1 tablet, Oral, TID  cefTRIAXone, 1,000 mg, Intravenous, Q24H  dexAMETHasone, 6 mg, Oral, Daily With Breakfast  enoxaparin, 40 mg, Subcutaneous, Q24H  erythromycin, 1 application , Both Eyes, Nightly  finasteride, 5 mg, Oral, Daily  furosemide, 40 mg, Oral, Daily  isosorbide mononitrate, 60 mg, Oral, Daily  levothyroxine, 75 mcg, Oral, Daily With Breakfast  metoprolol succinate XL, 50 mg, Oral, Daily  nystatin, 1 application , Topical, Q12H  rosuvastatin, 10 mg, Oral, Daily  tamsulosin, 0.4 mg, Oral, Daily  vitamin D3, 5,000 Units, Oral, Daily      Infusions  Pharmacy to Dose enoxaparin (LOVENOX),       PRNs    acetaminophen    benzonatate    Calcium Replacement - Follow Nurse / BPA Driven Protocol    hydrALAZINE    Magnesium Standard Dose Replacement - Follow Nurse / BPA Driven Protocol    nitroglycerin    ondansetron    Pharmacy to Dose enoxaparin (LOVENOX)    Phosphorus Replacement - Follow Nurse / BPA Driven Protocol    Potassium Replacement - Follow Nurse / BPA Driven Protocol    Physical Exam:  General Appearance:  Alert   HEENT:  Normocephalic, without obvious abnormality, L mild erythema and dryness in conjunctiva,   nares normal, no drainage     Neck:  Supple, symmetrical, trachea  midline.   Lungs /Chest wall:   Mild bilateral basal rhonchi, respirations unlabored, symmetrical wall movement.     Heart:  Regular rate and rhythm, S1 S2 normal  Abdomen: Soft, non-tender, no masses, no organomegaly.    Extremities: No edema, no clubbing or cyanosis     ROS  Constitutional: Negative for chills, fever and positive for malaise/fatigue.   HENT: Negative.    Eyes: Negative.    Cardiovascular: Negative.    Respiratory: Positive for improving cough and shortness of breath.    Skin: Negative.    Musculoskeletal: Negative.    Gastrointestinal: Negative.    Genitourinary: Negative.    Neurological: Generalized weakness      I reviewed the recent clinical results    Part of this note may be an electronic transcription/translation of spoken language to printed text using the Dragon Dictation System.

## 2023-09-21 NOTE — PLAN OF CARE
Goal Outcome Evaluation:  Plan of Care Reviewed With: patient        Progress: improving     Patient AO x 2, impulsive, on room air, good appetite, assist x 1. 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 Patient accidentally removed his IV access. VS taken and recorded. Will monitor.

## 2023-09-21 NOTE — PAYOR COMM NOTE
"This is a clinical update for Jermaine Black   Reference/Auth # [id number]     INPATIENT AUTHORIZATION PENDING:     Please call or fax determination to contact below.   Thank you.    Dottie Harper, RN, BSN  Utilization Review Nurse  [facility]  Direct & confidential phone # [phone]  Fax # [phone]      Jermaine Black (89 y.o. Male)       Date of Birth   11/30/1926    Social Security Number       Address   [address]  [place] [address]    Home Phone   [phone]    MRN   [id number]       D[place]ze   None    Marital Status                               Admission Date   9/19/23    Admission Type   Emergency    Admitting Provider   Rolan Staley MD    Attending Provider   James Allison DO    Department, Room/Bed   [facility] 2D, 252/1       Discharge Date       Discharge Disposition       Discharge Destination                                 Attending Provider: James Allison DO    Allergies: No Known Allergies    Isolation: Enh Drop/Con   Infection: COVID (confirmed) (09/19/23)   Code Status: No CPR    Ht: 167.6 cm (66\")   Wt: 72.2 kg (159 lb 2.8 oz)    Admission Cmt: None   Principal Problem: COVID-19 [U07.1]                   Active Insurance as of 9/19/2023       Primary Coverage       Payor Plan Insurance Group Employer/Plan Group    AETNA MEDICARE REPLACEMENT AETNA MEDICARE REPLACEMENT [id number]       Payor Plan Address Payor Plan Phone Number Payor Plan Fax Number Effective Dates    [address] [phone]  1/1/2022 - None Entered    [facility] [id number]         Subscriber Name Subscriber Birth Date Member ID       JERMAINE BLACK 11/30/1926 [id number]                     Emergency Contacts        (Rel.) Home Phone Work Phone Mobile Phone    Wayne-Rachel Valenzuela (Daughter) -- -- [phone]    Gerri Walker (Daughter) -- -- [phone]    Skylar Rutledge (Daughter) [phone] -- --              Vital Signs (last day) "       Date/Time Temp Temp src Pulse Resp BP Patient Position SpO2    09/21/23 0718 98.4 (36.9) Oral 76 16 131/73 Sitting 94    09/21/23 0500 -- -- 71 18 -- -- 94    09/21/23 0402 97.2 (36.2) Axillary 74 19 141/81 Lying 100    09/21/23 0200 -- -- 70 18 -- -- 100    09/21/23 0057 97.4 (36.3) Axillary 74 19 141/85 Lying 97    09/20/23 2345 -- -- 71 -- -- -- 97    09/20/23 2104 98.5 (36.9) Oral 76 15 132/85 Lying 99    09/20/23 1847 -- -- -- 16 -- -- --    09/20/23 1845 -- -- 73 18 -- -- 98    09/20/23 1819 97.8 (36.6) Oral 86 17 132/50 Lying 98    09/20/23 1502 -- -- 81 18 -- -- 98    09/20/23 1459 -- -- 79 20 -- -- 96    09/20/23 1447 97.7 (36.5) Oral 76 16 93/58 Sitting 98    09/20/23 1100 97.8 (36.6) Oral -- 21 132/72 Lying --    09/20/23 1044 -- -- 77 23 -- -- 96    09/20/23 1040 -- -- 74 23 -- -- 97    09/20/23 0824 -- -- 70 19 -- -- 100    09/20/23 0819 -- -- 70 17 -- -- 100    09/20/23 0800 98 (36.7) Oral 69 18 121/74 Lying 97    09/20/23 0339 97.5 (36.4) Oral 70 18 101/60 Lying 94    09/20/23 0004 98 (36.7) Axillary 74 17 108/53 Lying 97            Current Facility-Administered Medications   Medication Dose Route Frequency Provider Last Rate Last Admin    acetaminophen (TYLENOL) tablet 650 mg  650 mg Oral Q6H PRN James Allison DO   650 mg at 09/19/23 1901    albuterol sulfate HFA (PROVENTIL HFA;VENTOLIN HFA;PROAIR HFA) inhaler 2 puff  2 puff Inhalation 4x Daily - RT Rolan Staley MD   2 puff at 09/20/23 1845    benzonatate (TESSALON) capsule 100 mg  100 mg Oral TID PRN Lorena Rosas PA-C   100 mg at 09/21/23 0458    budesonide (PULMICORT) nebulizer solution 0.5 mg  0.5 mg Nebulization BID - RT Hanh Galloway APRN        Calcium Replacement - Follow Nurse / BPA Driven Protocol   Does not apply PRN Miracle Torre DO        carbidopa-levodopa ER (SINEMET CR)  MG per tablet 1 tablet  1 tablet Oral TID Lorena Rosas PA-C   1 tablet at 09/21/23 0722    cefTRIAXone (ROCEPHIN) 1,000 mg in sodium  chloride 0.9 % 100 mL IVPB  1,000 mg Intravenous Q24H Hanh Galloway APRN 200 mL/hr at 09/20/23 1710 1,000 mg at 09/20/23 1710    dexAMETHasone (DECADRON) tablet 6 mg  6 mg Oral Daily With Breakfast James Allison DO   6 mg at 09/21/23 0721    Enoxaparin Sodium (LOVENOX) syringe 40 mg  40 mg Subcutaneous Q24H Lorena Rosas PA-C   40 mg at 09/20/23 1710    erythromycin (ROMYCIN) ophthalmic ointment 1 application   1 application  Both Eyes Nightly Lorena Rosas PA-C   1 application  at 09/20/23 2106    finasteride (PROSCAR) tablet 5 mg  5 mg Oral Daily Lorena Rosas PA-C   5 mg at 09/21/23 0721    furosemide (LASIX) tablet 40 mg  40 mg Oral Daily James Allison DO        hydrALAZINE (APRESOLINE) injection 10 mg  10 mg Intravenous Q4H PRN Rolan Staley MD   10 mg at 09/19/23 1408    isosorbide mononitrate (IMDUR) 24 hr tablet 60 mg  60 mg Oral Daily Lorena Rosas PA-C   60 mg at 09/21/23 0721    levothyroxine (SYNTHROID, LEVOTHROID) tablet 75 mcg  75 mcg Oral Daily With Breakfast Lorena Rosas PA-C   75 mcg at 09/21/23 0500    Magnesium Standard Dose Replacement - Follow Nurse / BPA Driven Protocol   Does not apply PRN Miracle Torre DO        metoprolol succinate XL (TOPROL-XL) 24 hr tablet 50 mg  50 mg Oral Daily Lorena Rosas PA-C   50 mg at 09/21/23 0722    nitroglycerin (NITROSTAT) SL tablet 0.4 mg  0.4 mg Sublingual Q5 Min PRN Lorena Rosas PA-C        nystatin (MYCOSTATIN) [id number] UNIT/GM cream 1 application   1 application  Topical Q12H Miracle Torre DO   1 application  at 09/21/23 0928    ondansetron (ZOFRAN) injection 4 mg  4 mg Intravenous Q6H PRN James Allison DO        Pharmacy to Dose enoxaparin (LOVENOX)   Does not apply Continuous PRN Lorena Rosas PA-C        Phosphorus Replacement - Follow Nurse / BPA Driven Protocol   Does not apply PRN Miracle Torre DO        Potassium Replacement - Follow Nurse / BPA Driven Protocol   Does not apply PRN Miracle Torre, DO         rosuvastatin (CRESTOR) tablet 10 mg  10 mg Oral Daily Lorena Rosas PA-C   10 mg at 23 0721    tamsulosin (FLOMAX) 24 hr capsule 0.4 mg  0.4 mg Oral Daily Lorena Rosas PA-C   0.4 mg at 23 0722    vitamin D3 capsule 5,000 Units  5,000 Units Oral Daily Kylie Morton MD   5,000 Units at 23 0722        Physician Progress Notes (last 24 hours)        James Allison DO at 23 0947          [facility] Medicine Services   Daily Progress Note      Patient Name: Jermaine Black  : 1926  MRN: [id number]  Primary Care Physician:  Provider, No Known  Date of admission: 2023      Subjective      Chief Complaint: Shortness of breath, fall    Patient seen and examined this morning.  Doing well overall, breathing continues to improve.  No other complaints.  Had A-fib on EKG yesterday, cardiology has been consulted.    Pertinent positives as noted in HPI/subjective.  All other systems were reviewed and are negative.      Objective      Vitals:   Temp:  [97.2 °F (36.2 °C)-98.5 °F (36.9 °C)] 98.4 °F (36.9 °C)  Heart Rate:  [70-86] 76  Resp:  [15-23] 16  BP: ()/(50-85) 131/73  Flow (L/min):  [2] 2    Physical Exam:    General: Awake, alert, elderly male, lying in bed, NAD  Eyes: PERRL, EOMI, conjunctivae are clear  Cardiovascular: Regular rate and rhythm, no murmurs  Respiratory: Clear to auscultation bilaterally, no wheezing or rales, unlabored breathing  Abdomen: Soft, nontender, positive bowel sounds, no guarding  Neurologic: A&O, CN grossly intact, moves all extremities spontaneously  Musculoskeletal: Generalized weakness, no other gross deformities  Skin: Warm, dry, intact         Result Review    Result Review:  I have personally reviewed the results from the time of this admission to 2023 09:47 EDT and agree with these findings:  [x]  Laboratory  [x]  Microbiology  [x]  Radiology  [x]  EKG/Telemetry   [x]  Cardiology/Vascular   []  Pathology  []  Old records  []   Other:    Wounds (last 24 hours)       LDA Wound       Row Name 09/21/23 0800 09/21/23 0000 09/20/23 2103       [REMOVED] Wound 09/01/23 1731 coccyx    Wound - Properties Group Placement Date: 09/01/23  -EV Placement Time: 1731  -EV Location: coccyx  -EV Removal Date: 09/21/23  -AB Removal Time: 0007  -AB    Pressure Injury Stage -- -- O  masd  -BB    Dressing Appearance -- -- dry;intact  -BB    Closure Adhesive bandage  -SE Adhesive bandage  -AB Adhesive bandage  -BB    Base pink;dry  -SE -- --    Retired Wound - Properties Group Placement Date: 09/01/23  -EV Placement Time: 1731  -EV Location: coccyx  -EV Removal Date: 09/21/23  -AB Removal Time: 0007  -AB    Retired Wound - Properties Group Date first assessed: 09/01/23  -EV Time first assessed: 1731  -EV Location: coccyx  -EV Resolution Date: 09/21/23  -AB Resolution Time: 0007  -AB      Row Name 09/20/23 1300             [REMOVED] Wound 09/01/23 1731 coccyx    Wound - Properties Group Placement Date: 09/01/23  -EV Placement Time: 1731  -EV Location: coccyx  -EV Removal Date: 09/21/23  -AB Removal Time: 0007  -AB    Dressing Appearance intact;dry  -AW      Retired Wound - Properties Group Placement Date: 09/01/23  -EV Placement Time: 1731  -EV Location: coccyx  -EV Removal Date: 09/21/23  -AB Removal Time: 0007  -AB    Retired Wound - Properties Group Date first assessed: 09/01/23  -EV Time first assessed: 1731  -EV Location: coccyx  -EV Resolution Date: 09/21/23  -AB Resolution Time: 0007  -AB              User Key  (r) = Recorded By, (t) = Taken By, (c) = Cosigned By      Initials Name Provider Type    Mary Harrison RN Registered Nurse    Sintia Thomas LPN Licensed Nurse    Kim Purvis LPN Licensed Nurse    David Freire RN Registered Nurse    Mike Virk RN Registered Nurse                      Assessment & Plan      Brief Patient Summary:  Jermaine Black is a 89 y.o. male with a history of COPD, CHF with unknown EF,  Parkinson's Disease, Hypothyroidism, CAD, Hyperlipidemia, GERD, BPH, and HTN who came to the ER after a fall on the morning of admission. Patient states that his legs gave out and he fell onto his knees but did not hit his head. He further states he has had a cough for the past 3 weeks but no sputum production. In the ER patient noted with COVID-19 with atypical pneumonia as well as possible CHF exacerbation with elevated BNP.  Hypoxic requiring 2 to 3 L nasal cannula, started on dexamethasone and Lasix and pulmonology also consulted.      albuterol sulfate HFA, 2 puff, Inhalation, 4x Daily - RT  budesonide, 0.5 mg, Nebulization, BID - RT  carbidopa-levodopa ER, 1 tablet, Oral, TID  cefTRIAXone, 1,000 mg, Intravenous, Q24H  dexAMETHasone, 6 mg, Oral, Daily With Breakfast  enoxaparin, 40 mg, Subcutaneous, Q24H  erythromycin, 1 application , Both Eyes, Nightly  finasteride, 5 mg, Oral, Daily  furosemide, 40 mg, Oral, Daily  isosorbide mononitrate, 60 mg, Oral, Daily  levothyroxine, 75 mcg, Oral, Daily With Breakfast  metoprolol succinate XL, 50 mg, Oral, Daily  nystatin, 1 application , Topical, Q12H  rosuvastatin, 10 mg, Oral, Daily  tamsulosin, 0.4 mg, Oral, Daily  vitamin D3, 5,000 Units, Oral, Daily       Pharmacy to Dose enoxaparin (LOVENOX),          I have utilized all available, immediate resources to obtain, update, or review the patient's current medications including all prescriptions, over-the-counter products, herbals, cannabis/cannabidiol products, and vitamin.mineral/dietary (nutritional) supplements.    Active Hospital Problems:  Active Hospital Problems    Diagnosis     **COVID-19     COVID-19 virus detected      Plan:     Acute hypoxemic respiratory failure, improved  COVID-19 infection with atypical pneumonia  -Chest x-ray findings noted  -Continue bronchodilators and dexamethasone  -Improved and weaned down to room air now  -Encourage spirometry  -Pulmonology following    Acute on chronic  HFrEF  Severe aortic stenosis  -Echo this admission shows EF of 40 to 45% and severe aortic stenosis noted  -Medical management now for diuresis with Lasix as tolerated, monitor I's and O's  -Due to advanced age, patient likely not a candidate for any aggressive therapy for aortic stenosis  -Cardiology consulted     PAF  SSS s/p pacemaker  -Diagnosed with A-fib during this admission, rate well controlled  -Continue beta-blocker and monitor on telemetry  -Patient very high fall risk, not a candidate for full anticoagulation with also recent history of questionable SAH noted during previous admission  -Cardiology consulted    Elevated D-dimer  -Likely secondary to COVID, less likely VTE  -CTA chest negative for PE  -Monitor    Parkinson's disease  Deconditioning  -Continue home meds  -PT/OT    Hypertension  Hyperlipidemia  -Blood pressure controlled  -Continue home meds as tolerated, monitor    Hypothyroidism  GERD  BPH  -Continue home meds as tolerated, monitor    DVT prophylaxis  -Lovenox    CODE STATUS:    Medical Intervention Limits: NO intubation (DNI)  Level Of Support Discussed With: Health Care Surrogate  Code Status (Patient has no pulse and is not breathing): No CPR (Do Not Attempt to Resuscitate)  Medical Interventions (Patient has pulse or is breathing): Limited Support      Disposition: Pending improvement, needs SNF placement.    Electronically signed by James Allison DO, 09/21/23, 09:47 EDT.  [facility] Hospitalist Team      Part of this note may be an electronic transcription/translation of spoken language to printed text using the Dragon Dictation System.      Electronically signed by James Allison DO at 09/21/23 0926       Kylie Morton MD at 09/20/23 1135          Daily Progress Note          Assessment    Hypoxemia  COVID-19  Atypical pneumonia  COPD  CHF  BPH  HTN  Parkinson's disease   Hypothyroidism  GERD  CAD          PLAN:  Oxygen supplement and titration to maintain saturation 90-95%:  Currently requiring 2 L per nasal cannula  Antibiotics: Rocephin and discontinue doxycycline  Remdesivir is not needed at this point since the COVID pneumonia is mild  Dexamethasone  Bronchodilators  Inhaled corticosteroids  Awaiting Echo   Watch INOS  Speech therapy evaluated him and recommended puréed diet with aspiration precautions  Incentive spirometer  Electrolytes/ glycemic control  Thyroid hormone replacement  BP control  Crestor  DVT  prophylaxis-Lovenox            LOS: 1 day     Subjective     Patient had a fever yesterday 100.8, patient reports mild cough and shortness of breath    Objective     Vital signs for last 24 hours:  Vitals:    09/20/23 0819 09/20/23 0824 09/20/23 1040 09/20/23 1044   BP:       BP Location:       Patient Position:       Pulse: 70 70 74 77   Resp: 17 19 23 23   Temp:       TempSrc:       SpO2: 100% 100% 97% 96%   Weight:       Height:           Intake/Output last 3 shifts:  I/O last 3 completed shifts:  In: 240 [P.O.:240]  Out: 1600 [Urine:1600]  Intake/Output this shift:  I/O this shift:  In: 240 [P.O.:240]  Out: -       Radiology  Imaging Results (Last 24 Hours)       ** No results found for the last 24 hours. **            Labs:  Results from last 7 days   Lab Units 09/20/23  0041   WBC 10*3/mm3 7.00   HEMOGLOBIN g/dL 12.4*   HEMATOCRIT % 37.5   PLATELETS 10*3/mm3 137*     Results from last 7 days   Lab Units 09/20/23  0041   SODIUM mmol/L 140   POTASSIUM mmol/L 3.5   CHLORIDE mmol/L 99   CO2 mmol/L 30.0*   BUN mg/dL 24*   CREATININE mg/dL 1.01   CALCIUM mg/dL 8.5   GLUCOSE mg/dL 178*             Results from last 7 days   Lab Units 09/19/23 2011 09/19/23  0931 09/19/23  0729   HSTROP T ng/L 116* 66* 76*                           Meds:   SCHEDULE  albuterol sulfate HFA, 2 puff, Inhalation, 4x Daily - RT  budesonide, 0.5 mg, Nebulization, BID - RT  carbidopa-levodopa ER, 1 tablet, Oral, TID  cefTRIAXone, 1,000 mg, Intravenous, Q24H  dexAMETHasone, 6 mg, Oral, Daily With  Breakfast  doxycycline, 100 mg, Intravenous, Q12H  enoxaparin, 40 mg, Subcutaneous, Q24H  erythromycin, 1 application , Both Eyes, Nightly  finasteride, 5 mg, Oral, Daily  furosemide, 40 mg, Intravenous, Q12H  isosorbide mononitrate, 60 mg, Oral, Daily  levothyroxine, 75 mcg, Oral, Daily With Breakfast  metoprolol succinate XL, 50 mg, Oral, Daily  rosuvastatin, 10 mg, Oral, Daily  tamsulosin, 0.4 mg, Oral, Daily      Infusions  Pharmacy to Dose enoxaparin (LOVENOX),       PRNs    acetaminophen    benzonatate    hydrALAZINE    nitroglycerin    ondansetron    Pharmacy to Dose enoxaparin (LOVENOX)    Physical Exam:  General Appearance:  Alert   HEENT:  Normocephalic, without obvious abnormality, mild erythema and dryness in conjunctiva,   nares normal, no drainage     Neck:  Supple, symmetrical, trachea midline.   Lungs /Chest wall:   Mild bilateral basal rhonchi, respirations unlabored, symmetrical wall movement.     Heart:  Regular rate and rhythm, S1 S2 normal  Abdomen: Soft, non-tender, no masses, no organomegaly.    Extremities: No edema, no clubbing or cyanosis     ROS  Constitutional: Negative for chills, fever and positive for malaise/fatigue.   HENT: Negative.    Eyes: Negative.    Cardiovascular: Negative.    Respiratory: Positive for cough and shortness of breath.    Skin: Negative.    Musculoskeletal: Negative.    Gastrointestinal: Negative.    Genitourinary: Negative.    Neurological: Generalized weakness      I reviewed the recent clinical results    Part of this note may be an electronic transcription/translation of spoken language to printed text using the Dragon Dictation System.      Electronically signed by Kylie Morton MD at 09/20/23 1252       Consult Notes (last 24 hours)  Notes from 09/20/23 1033 through 09/21/23 1033   No notes of this type exist for this encounter.

## 2023-09-21 NOTE — CASE MANAGEMENT/SOCIAL WORK
Continued Stay Note   Rai     Patient Name: Jermaine Black  MRN: [id number]  Today's Date: 9/21/2023    Admit Date: 9/19/2023    Plan: D/C Plan: [facility]; Pre-cert initiated this am; PASSR approved; Transport TBD   Discharge Plan       Row Name 09/21/23 1025       Plan    Plan D/C Plan: [facility] accepted; Pre-cert initiated this am; PASSR approved; Transport TBD               Expected Discharge Date and Time       Expected Discharge Date Expected Discharge Time    Sep 22, 2023               Smiley Palencia RN

## 2023-09-22 LAB
ANION GAP SERPL CALCULATED.3IONS-SCNC: 5 MMOL/L (ref 5–15)
BASOPHILS # BLD AUTO: 0 10*3/MM3 (ref 0–0.2)
BASOPHILS NFR BLD AUTO: 0.1 % (ref 0–1.5)
BUN SERPL-MCNC: 29 MG/DL (ref 8–23)
BUN/CREAT SERPL: 26.9 (ref 7–25)
CALCIUM SPEC-SCNC: 8 MG/DL (ref 8.2–9.6)
CHLORIDE SERPL-SCNC: 97 MMOL/L (ref 98–107)
CO2 SERPL-SCNC: 35 MMOL/L (ref 22–29)
CREAT SERPL-MCNC: 1.08 MG/DL (ref 0.76–1.27)
DEPRECATED RDW RBC AUTO: 49.4 FL (ref 37–54)
EGFRCR SERPLBLD CKD-EPI 2021: 62.8 ML/MIN/1.73
EOSINOPHIL # BLD AUTO: 0 10*3/MM3 (ref 0–0.4)
EOSINOPHIL NFR BLD AUTO: 0 % (ref 0.3–6.2)
ERYTHROCYTE [DISTWIDTH] IN BLOOD BY AUTOMATED COUNT: 14.7 % (ref 12.3–15.4)
GLUCOSE SERPL-MCNC: 149 MG/DL (ref 65–99)
HCT VFR BLD AUTO: 35.4 % (ref 37.5–51)
HGB BLD-MCNC: 11.7 G/DL (ref 13–17.7)
LYMPHOCYTES # BLD AUTO: 0.5 10*3/MM3 (ref 0.7–3.1)
LYMPHOCYTES NFR BLD AUTO: 8.7 % (ref 19.6–45.3)
MCH RBC QN AUTO: 32.2 PG (ref 26.6–33)
MCHC RBC AUTO-ENTMCNC: 33.1 G/DL (ref 31.5–35.7)
MCV RBC AUTO: 97.2 FL (ref 79–97)
MONOCYTES # BLD AUTO: 0.8 10*3/MM3 (ref 0.1–0.9)
MONOCYTES NFR BLD AUTO: 12.8 % (ref 5–12)
NEUTROPHILS NFR BLD AUTO: 4.8 10*3/MM3 (ref 1.7–7)
NEUTROPHILS NFR BLD AUTO: 78.4 % (ref 42.7–76)
NRBC BLD AUTO-RTO: 0 /100 WBC (ref 0–0.2)
PLATELET # BLD AUTO: 140 10*3/MM3 (ref 140–450)
PMV BLD AUTO: 9.4 FL (ref 6–12)
POTASSIUM SERPL-SCNC: 3.9 MMOL/L (ref 3.5–5.2)
RBC # BLD AUTO: 3.64 10*6/MM3 (ref 4.14–5.8)
SODIUM SERPL-SCNC: 137 MMOL/L (ref 136–145)
WBC NRBC COR # BLD: 6.2 10*3/MM3 (ref 3.4–10.8)

## 2023-09-22 PROCEDURE — 94799 UNLISTED PULMONARY SVC/PX: CPT

## 2023-09-22 PROCEDURE — 99232 SBSQ HOSP IP/OBS MODERATE 35: CPT | Performed by: INTERNAL MEDICINE

## 2023-09-22 PROCEDURE — 85025 COMPLETE CBC W/AUTO DIFF WBC: CPT | Performed by: INTERNAL MEDICINE

## 2023-09-22 PROCEDURE — 80048 BASIC METABOLIC PNL TOTAL CA: CPT | Performed by: INTERNAL MEDICINE

## 2023-09-22 PROCEDURE — 25010000002 ENOXAPARIN PER 10 MG

## 2023-09-22 PROCEDURE — 63710000001 DEXAMETHASONE PER 0.25 MG: Performed by: INTERNAL MEDICINE

## 2023-09-22 RX ADMIN — DEXAMETHASONE 6 MG: 4 TABLET ORAL at 08:10

## 2023-09-22 RX ADMIN — ROSUVASTATIN 10 MG: 10 TABLET, FILM COATED ORAL at 08:10

## 2023-09-22 RX ADMIN — CARBIDOPA AND LEVODOPA 1 TABLET: 25; 100 TABLET, EXTENDED RELEASE ORAL at 08:10

## 2023-09-22 RX ADMIN — CEFUROXIME AXETIL 500 MG: 500 TABLET ORAL at 19:49

## 2023-09-22 RX ADMIN — ENOXAPARIN SODIUM 40 MG: 40 INJECTION, SOLUTION SUBCUTANEOUS at 14:39

## 2023-09-22 RX ADMIN — ISOSORBIDE MONONITRATE 60 MG: 60 TABLET, EXTENDED RELEASE ORAL at 08:10

## 2023-09-22 RX ADMIN — Medication 5000 UNITS: at 08:10

## 2023-09-22 RX ADMIN — FINASTERIDE 5 MG: 5 TABLET, FILM COATED ORAL at 08:10

## 2023-09-22 RX ADMIN — METOPROLOL SUCCINATE 50 MG: 50 TABLET, EXTENDED RELEASE ORAL at 08:10

## 2023-09-22 RX ADMIN — FUROSEMIDE 40 MG: 40 TABLET ORAL at 08:10

## 2023-09-22 RX ADMIN — ALBUTEROL SULFATE 2 PUFF: 108 AEROSOL, METERED RESPIRATORY (INHALATION) at 07:12

## 2023-09-22 RX ADMIN — CARBIDOPA AND LEVODOPA 1 TABLET: 25; 100 TABLET, EXTENDED RELEASE ORAL at 19:49

## 2023-09-22 RX ADMIN — ERYTHROMYCIN 1 APPLICATION: 5 OINTMENT OPHTHALMIC at 19:49

## 2023-09-22 RX ADMIN — CEFUROXIME AXETIL 500 MG: 500 TABLET ORAL at 08:10

## 2023-09-22 RX ADMIN — LEVOTHYROXINE SODIUM 75 MCG: 0.07 TABLET ORAL at 06:02

## 2023-09-22 RX ADMIN — ALBUTEROL SULFATE 2 PUFF: 108 AEROSOL, METERED RESPIRATORY (INHALATION) at 11:27

## 2023-09-22 RX ADMIN — TAMSULOSIN HYDROCHLORIDE 0.4 MG: 0.4 CAPSULE ORAL at 08:10

## 2023-09-22 RX ADMIN — CARBIDOPA AND LEVODOPA 1 TABLET: 25; 100 TABLET, EXTENDED RELEASE ORAL at 14:39

## 2023-09-22 RX ADMIN — NYSTATIN 1 APPLICATION: 100000 CREAM TOPICAL at 19:49

## 2023-09-22 RX ADMIN — NYSTATIN 1 APPLICATION: 100000 CREAM TOPICAL at 08:11

## 2023-09-22 NOTE — PROGRESS NOTES
Daily Progress Note          Assessment    Hypoxemia  COVID-19  Atypical pneumonia  COPD  CHF  BPH  HTN  Parkinson's disease   Hypothyroidism  GERD  CAD   Results for orders placed during the hospital encounter of 09/19/23    Adult Transthoracic Echo Limited W/ Cont if Necessary Per Protocol    Interpretation Summary    Left ventricular ejection fraction appears to be 46 - 50%.    Severe aortic valve stenosis is present.       PLAN:  Respiratory status is improving and currently on RA, ok to transfer to SNF from pulmonary stand point.  Antibiotics: Rocephin changed to Ceftin  Remdesivir is not needed at this point since the COVID pneumonia is mild  Dexamethasone oral to finish 9/23/2023  Bronchodilators  Inhaled corticosteroids  Awaiting Echo   Watch INOS  Speech therapy evaluated him and recommended puréed diet with aspiration precautions  Incentive spirometer  Electrolytes/ glycemic control  Thyroid hormone replacement  BP control  Crestor  DVT  prophylaxis-Lovenox            LOS: 3 days     Subjective     Afebrile, patient reports no cough and less shortness of breath    Objective     Vital signs for last 24 hours:  Vitals:    09/21/23 2010 09/21/23 2319 09/22/23 0605 09/22/23 0810   BP: 116/70 146/88 144/84 139/82   BP Location: Right arm Right arm Right arm    Patient Position: Lying Lying Lying    Pulse: 86 81 75 76   Resp: 18 24 14    Temp: 97.8 °F (36.6 °C) 97.8 °F (36.6 °C) 97.7 °F (36.5 °C)    TempSrc: Oral Oral Axillary    SpO2: 95% 90% 96%    Weight:   72.3 kg (159 lb 6.3 oz)    Height:           Intake/Output last 3 shifts:  I/O last 3 completed shifts:  In: 768 [P.O.:768]  Out: 1860 [Urine:1860]  Intake/Output this shift:  I/O this shift:  In: 380 [P.O.:380]  Out: 100 [Urine:100]      Radiology  Imaging Results (Last 24 Hours)       ** No results found for the last 24 hours. **            Labs:  Results from last 7 days   Lab Units 09/22/23  0049   WBC 10*3/mm3 6.20   HEMOGLOBIN g/dL 11.7*   HEMATOCRIT  % 35.4*   PLATELETS 10*3/mm3 140       Results from last 7 days   Lab Units 09/22/23  0049   SODIUM mmol/L 137   POTASSIUM mmol/L 3.9   CHLORIDE mmol/L 97*   CO2 mmol/L 35.0*   BUN mg/dL 29*   CREATININE mg/dL 1.08   CALCIUM mg/dL 8.0*   GLUCOSE mg/dL 149*               Results from last 7 days   Lab Units 09/19/23 2011 09/19/23  0931 09/19/23  0729   HSTROP T ng/L 116* 66* 76*                             Meds:   SCHEDULE  albuterol sulfate HFA, 2 puff, Inhalation, 4x Daily - RT  budesonide, 0.5 mg, Nebulization, BID - RT  carbidopa-levodopa ER, 1 tablet, Oral, TID  cefuroxime, 500 mg, Oral, Q12H  dexAMETHasone, 6 mg, Oral, Daily With Breakfast  enoxaparin, 40 mg, Subcutaneous, Q24H  erythromycin, 1 application , Both Eyes, Nightly  finasteride, 5 mg, Oral, Daily  furosemide, 40 mg, Oral, Daily  isosorbide mononitrate, 60 mg, Oral, Daily  levothyroxine, 75 mcg, Oral, Daily With Breakfast  metoprolol succinate XL, 50 mg, Oral, Daily  nystatin, 1 application , Topical, Q12H  rosuvastatin, 10 mg, Oral, Daily  tamsulosin, 0.4 mg, Oral, Daily  vitamin D3, 5,000 Units, Oral, Daily      Infusions  Pharmacy to Dose enoxaparin (LOVENOX),       PRNs    acetaminophen    benzonatate    Calcium Replacement - Follow Nurse / BPA Driven Protocol    hydrALAZINE    Magnesium Standard Dose Replacement - Follow Nurse / BPA Driven Protocol    nitroglycerin    ondansetron    Pharmacy to Dose enoxaparin (LOVENOX)    Phosphorus Replacement - Follow Nurse / BPA Driven Protocol    Potassium Replacement - Follow Nurse / BPA Driven Protocol    Physical Exam:  General Appearance:  Alert   HEENT:  Normocephalic, without obvious abnormality, L mild erythema and dryness in conjunctiva,   nares normal, no drainage     Neck:  Supple, symmetrical, trachea midline.   Lungs /Chest wall:   Mild bilateral basal rhonchi, respirations unlabored, symmetrical wall movement.     Heart:  Regular rate and rhythm, S1 S2 normal  Abdomen: Soft, non-tender, no  masses, no organomegaly.    Extremities: No edema, no clubbing or cyanosis     ROS  Constitutional: Negative for chills, fever and positive for malaise/fatigue.   HENT: Negative.    Eyes: Negative.    Cardiovascular: Negative.    Respiratory: Positive for improving cough and shortness of breath.    Skin: Negative.    Musculoskeletal: Negative.    Gastrointestinal: Negative.    Genitourinary: Negative.    Neurological: Generalized weakness      I reviewed the recent clinical results    Part of this note may be an electronic transcription/translation of spoken language to printed text using the Dragon Dictation System.

## 2023-09-22 NOTE — PROGRESS NOTES
CARDIOLOGY PROGRESS NOTE:    Jermaine Black  89 y.o.  male  11/30/1926  [id number]      Referring Provider: Hospitalist    Reason for follow-up: Pneumonia     Patient Care Team:  Provider, No Known as PCP - General    Subjective .  Patient still has some shortness of breath    Objective lying in bed comfortably     Review of Systems   Constitutional: Negative for malaise/fatigue.   Cardiovascular:  Negative for chest pain, dyspnea on exertion, leg swelling and palpitations.   Respiratory:  Positive for shortness of breath. Negative for cough.    Gastrointestinal:  Negative for abdominal pain, nausea and vomiting.   Neurological:  Negative for dizziness, focal weakness, headaches, light-headedness and numbness.   All other systems reviewed and are negative.    Allergies: Patient has no known allergies.    Scheduled Meds:albuterol sulfate HFA, 2 puff, Inhalation, 4x Daily - RT  budesonide, 0.5 mg, Nebulization, BID - RT  carbidopa-levodopa ER, 1 tablet, Oral, TID  cefuroxime, 500 mg, Oral, Q12H  dexAMETHasone, 6 mg, Oral, Daily With Breakfast  enoxaparin, 40 mg, Subcutaneous, Q24H  erythromycin, 1 application , Both Eyes, Nightly  finasteride, 5 mg, Oral, Daily  furosemide, 40 mg, Oral, Daily  isosorbide mononitrate, 60 mg, Oral, Daily  levothyroxine, 75 mcg, Oral, Daily With Breakfast  metoprolol succinate XL, 50 mg, Oral, Daily  nystatin, 1 application , Topical, Q12H  rosuvastatin, 10 mg, Oral, Daily  tamsulosin, 0.4 mg, Oral, Daily  vitamin D3, 5,000 Units, Oral, Daily      Continuous Infusions:Pharmacy to Dose enoxaparin (LOVENOX),       PRN Meds:.  acetaminophen    benzonatate    Calcium Replacement - Follow Nurse / BPA Driven Protocol    hydrALAZINE    Magnesium Standard Dose Replacement - Follow Nurse / BPA Driven Protocol    nitroglycerin    ondansetron    Pharmacy to Dose enoxaparin (LOVENOX)    Phosphorus Replacement - Follow Nurse / BPA Driven Protocol    Potassium Replacement - Follow Nurse / BPA Driven  "Protocol        VITAL SIGNS  Vitals:    09/22/23 0810 09/22/23 1100 09/22/23 1127 09/22/23 1131   BP: 139/82 97/58     BP Location:  Right arm     Patient Position:  Lying     Pulse: 76 76 70 72   Resp:  19 14 14   Temp:  97.8 °F (36.6 °C)     TempSrc:  Oral     SpO2:  93% 95% 96%   Weight:       Height:           Flowsheet Rows      Flowsheet Row First Filed Value   Admission Height 167.6 cm (66\") Documented at 09/19/2023 0540   Admission Weight 72.6 kg (160 lb) Documented at 09/19/2023 0540             TELEMETRY: Atrial fibrillation with ventricular pacemaker in backup mode    Physical Exam:  Constitutional:       Appearance: Well-developed.   Eyes:      General: No scleral icterus.     Conjunctiva/sclera: Conjunctivae normal.   HENT:      Head: Normocephalic and atraumatic.   Neck:      Vascular: No carotid bruit or JVD.   Pulmonary:      Effort: Pulmonary effort is normal.      Breath sounds: Normal breath sounds. No wheezing. No rales.   Cardiovascular:      Normal rate. Regular rhythm.      Murmurs: There is a systolic murmur.   Pulses:     Intact distal pulses.   Abdominal:      General: Bowel sounds are normal.      Palpations: Abdomen is soft.   Musculoskeletal:      Cervical back: Normal range of motion and neck supple. Skin:     General: Skin is warm and dry.      Findings: No rash.   Neurological:      Mental Status: Alert.        Results Review:   I reviewed the patient's new clinical results.  Lab Results (last 24 hours)       Procedure Component Value Units Date/Time    Basic Metabolic Panel [[id number]]  (Abnormal) Collected: 09/22/23 0049    Specimen: Blood Updated: 09/22/23 0155     Glucose 149 mg/dL      BUN 29 mg/dL      Creatinine 1.08 mg/dL      Sodium 137 mmol/L      Potassium 3.9 mmol/L      Chloride 97 mmol/L      CO2 35.0 mmol/L      Calcium 8.0 mg/dL      BUN/Creatinine Ratio 26.9     Anion Gap 5.0 mmol/L      eGFR 62.8 mL/min/1.73     Narrative:      GFR Normal >60  Chronic Kidney Disease " <60  Kidney Failure <15    The GFR formula is only valid for adults with stable renal function between ages 18 and 70.    CBC & Differential [[id number]]  (Abnormal) Collected: 09/22/23 0049    Specimen: Blood Updated: 09/22/23 0119    Narrative:      The following orders were created for panel order CBC & Differential.  Procedure                               Abnormality         Status                     ---------                               -----------         ------                     CBC Auto Differential[[id number]]        Abnormal            Final result                 Please view results for these tests on the individual orders.    CBC Auto Differential [[id number]]  (Abnormal) Collected: 09/22/23 0049    Specimen: Blood Updated: 09/22/23 0119     WBC 6.20 10*3/mm3      RBC 3.64 10*6/mm3      Hemoglobin 11.7 g/dL      Hematocrit 35.4 %      MCV 97.2 fL      MCH 32.2 pg      MCHC 33.1 g/dL      RDW 14.7 %      RDW-SD 49.4 fl      MPV 9.4 fL      Platelets 140 10*3/mm3      Neutrophil % 78.4 %      Lymphocyte % 8.7 %      Monocyte % 12.8 %      Eosinophil % 0.0 %      Basophil % 0.1 %      Neutrophils, Absolute 4.80 10*3/mm3      Lymphocytes, Absolute 0.50 10*3/mm3      Monocytes, Absolute 0.80 10*3/mm3      Eosinophils, Absolute 0.00 10*3/mm3      Basophils, Absolute 0.00 10*3/mm3      nRBC 0.0 /100 WBC     Potassium [[id number]]  (Normal) Collected: 09/21/23 1346    Specimen: Blood Updated: 09/21/23 1501     Potassium 4.3 mmol/L             Imaging Results (Last 24 Hours)       ** No results found for the last 24 hours. **            EKG      I personally viewed and interpreted the patient's EKG/Telemetry data:    ECHOCARDIOGRAM:  Results for orders placed during the hospital encounter of 09/19/23    Adult Transthoracic Echo Limited W/ Cont if Necessary Per Protocol    Interpretation Summary    Left ventricular ejection fraction appears to be 46 - 50%.    Severe aortic valve stenosis is present.       STRESS  MYOVIEW:       CARDIAC CATHETERIZATION:  No results found for this or any previous visit.       OTHER:     Atrial fibrillation  Patient has new onset atrial fibrillation and is having some falls but no dizziness or syncope  Patient's rate is well controlled on beta-blockers, will continue the same dose  Patient's family is not able to have discussed with him and he was not interested in anticoagulation but I will place him on aspirin at least.  Patient has high risk of falls secondary to Parkinson disease and his age.    History of pacemaker placement  Patient had a pacemaker which is working very well and will follow in the office.     Severe aortic stenosis  Patient had an echocardiogram which showed severe aortic stenosis with mild LV dysfunction EF of 45 to 50%  Because of his age will continue conservative therapy only     Congestive heart failure  Patient has congestive heart failure with midrange LV ejection fraction EF of 45 to 50% and is currently on beta-blockers  If patient's blood pressure is stable then I will put him on low-dose ACE inhibitors  We will follow him in the office     Hyperlipidemia  Patient is currently on statins     Hypertension  Patient blood pressure currently stable on isosorbide and beta-blockers     Coronary disease  Patient has history of coronary disease in the past and is followed by a cardiologist in the past but no documentation available but is currently stable without any angina and is on beta-blockers and nitrates and will be on aspirin also     History of Parkinson disease  Patient has history of Parkinson disease and is currently taking medicines and it is 1 reason also why he should not be on any anticoagulation        I discussed the patients findings and my recommendations with patient and nurse    Fili Colón MD  09/22/23  12:26 EDT

## 2023-09-22 NOTE — CONSULTS
Nutrition Services    Patient Name: Jermaine Black  YOB: 1926  MRN: [id number]  Admission date: 9/19/2023    See MSA at bottom of this note    Moderate chronic disease related malnutrition R/t suspected hypermetabolism in the setting of multiple chronic Dz (including respiratory Dz and current COVID); AEB predominantly moderate muscle and fat wasting and mild edema per NFPE.     Per hypermetabolism with COVID, recommend starting Boost Plus BID (Provides 720 kcals, 28 g protein if consumed) in addition to diet    PPE Documentation        PPE Worn By Provider gloves, eye protection, gown, and respirator   PPE Worn By Patient  None     NUTRITION SCREENING      Trending Narrative: 9/22: Pt assessed due to MST score for unsure weight Hx. Pt with muscle and fat loss, but limited weight Hx; not a lot of feedback regarding usual intakes, but noted pt with need for feeding assistance and mechanical ground diet. Suspect respiratory condition is increasing energy burn, resulting in muscle/fat wasting. Could benefit from ONS in addition to diet.        PO Diet: Diet: Regular/House Diet; Feeding Assistance - Nursing; Texture: Mechanical Ground (NDD 2); Fluid Consistency: Thin (IDDSI 0)   PO Supplements: None ordered   Trending PO Intake:  % at all recent meals, with feeding assistance        Nutritionally-Pertinent Medications RDN Reviewed, C/W clinical course         Labs (reviewed below): Reviewed     Results from last 7 days   Lab Units 09/22/23  0049 09/21/23  1346 09/21/23  0226 09/20/23  0041   SODIUM mmol/L 137  --  140 140   POTASSIUM mmol/L 3.9 4.3 3.3* 3.5   CHLORIDE mmol/L 97*  --  97* 99   CO2 mmol/L 35.0*  --  36.0* 30.0*   BUN mg/dL 29*  --  27* 24*   CREATININE mg/dL 1.08  --  1.05 1.01   CALCIUM mg/dL 8.0*  --  7.9* 8.5   GLUCOSE mg/dL 149*  --  97 178*     Results from last 7 days   Lab Units 09/22/23  0049   HEMOGLOBIN g/dL 11.7*   HEMATOCRIT % 35.4*     Lab Results   Component Value  "Date    HGBA1C 6.1 (H) 06/21/2022          GI Function:  BM 9/19       Skin: No breakdown documented        Weight Review: Estimated body mass index is 25.73 kg/m² as calculated from the following:    Height as of this encounter: 167.6 cm (66\").    Weight as of this encounter: 72.3 kg (159 lb 6.3 oz).    Comment:   9/22: 72.3 kg current weight   All available *scale* weights are consistent with this -- had one recorded weight from 8/30 entered as an \"estimated\" weight, but not accurate; will continue to monitor --- physical assessment does reveal wasting    Wt Readings from Last 30 Encounters:   09/22/23 0605 72.3 kg (159 lb 6.3 oz)   09/21/23 0402 72.2 kg (159 lb 2.8 oz)   09/20/23 0339 71.9 kg (158 lb 8.2 oz)   09/19/23 0540 72.6 kg (160 lb)   09/04/23 0343 72.8 kg (160 lb 7.9 oz)   09/03/23 0413 73.5 kg (162 lb 0.6 oz)   08/30/23 1641 86.2 kg (190 lb)          Estimated/Assessed Needs    Estimated 9/22/23   Energy Requirements    EST Needs, Method, Wt used 6282-6838 kcal/day (30-35 kcal/kg)        Protein Requirements    EST Needs, Method, Wt used 94 g/day (1.3 g/kg)       Fluid Requirements     Estimated Needs (mL/day) 1mL/kcal - monitor hydration status          Trending Physical   Appearance, NFPE 9/22: NFPE completed and not consistent with nutrition diagnosis of malnutrition at this time using AND/ASPEN criteria             Nutrition Problem Statement: Increased energy needs R/t hypermetabolism with COVID infection; as evidenced by LEEP study and current pt Dx.     And    Moderate chronic disease related malnutrition R/t suspected hypermetabolism in the setting of multiple chronic Dz (including respiratory Dz and current COVID); AEB predominantly moderate muscle and fat wasting and mild edema per NFPE.         Nutrition Intervention: Add Boost Plus BID (Provides 720 kcals, 28 g protein if consumed) for additional kcal/PRO     Continue current diet with feeding assistance to encourage intake.        " Monitoring/Evaluation I&O, PO intake, Pertinent labs, Weight, Skin status, GI status, POC/GOC        Malnutrition Severity Assessment      Patient meets criteria for : Moderate (non-severe) Malnutrition  Malnutrition Type (last 8 hours)       Malnutrition Severity Assessment       Row Name 09/22/23 1844       Malnutrition Severity Assessment    Malnutrition Type Chronic Disease - Related Malnutrition      Row Name 09/22/23 1844       Muscle Loss    Loss of Muscle Mass Findings Moderate    L[place]day Region Severe - deep hollowing/scooping, lack of muscle to touch, facial bones well defined    Clavicle Bone Region Severe - protruding prominent bone    Acromion Bone Region Moderate - acromion may slightly protrude    Scapular Bone Region Moderate - mild depression, bones may show slightly    Dorsal Hand Region Moderate - slight depression    Patellar Region Severe - prominent bone, square looking, very little muscle definition    Anterior Thigh Region Severe - line/depression along thigh, obviously thin    Posterior Calf Region Severe - thin with very little definition/firmness      Row Name 09/22/23 1844       Fat Loss    Subcutaneous Fat Loss Findings Moderate    Orbital Region  Moderate -  somewhat hollowness, slightly dark circles    Upper Arm Region Moderate - some fat tissue, not ample    Thoracic & Lumbar Region Moderate - ribs visible with mild depressions, iliac crest somewhat prominent      Row Name 09/22/23 1844       Fluid Accumulation (Edema)    Fluid Acumulation Findings Mild    Fluid Accumulation  Mild equals 1+ pitting edema      Row Name 09/22/23 1844       Criteria Met (Must meet criteria for severity in at least 2 of these categories: M Wasting, Fat Loss, Fluid, Secondary Signs, Wt. Status, Intake)    Patient meets criteria for  Moderate (non-severe) Malnutrition                     RD to follow up per protocol.    Electronically signed by:  Marianne Lion RD  09/22/23 18:33 EDT

## 2023-09-22 NOTE — PLAN OF CARE
Problem: Fall Injury Risk  Goal: Absence of Fall and Fall-Related Injury  Intervention: Identify and Manage Contributors  Recent Flowsheet Documentation  Taken 9/22/2023 1644 by Kimberly Collins RN  Medication Review/Management: medications reviewed  Taken 9/22/2023 1442 by Kimberly Collins RN  Medication Review/Management: medications reviewed  Taken 9/22/2023 1204 by Kimberly Collins RN  Medication Review/Management: medications reviewed  Taken 9/22/2023 1037 by Kimberly Collins RN  Medication Review/Management: medications reviewed  Taken 9/22/2023 0813 by Kimberly Collins RN  Medication Review/Management: medications reviewed  Intervention: Promote Injury-Free Environment  Recent Flowsheet Documentation  Taken 9/22/2023 1644 by Kimberly Collins RN  Safety Promotion/Fall Prevention:   assistive device/personal items within reach   clutter free environment maintained   fall prevention program maintained   lighting adjusted   nonskid shoes/slippers when out of bed   room organization consistent   safety round/check completed  Taken 9/22/2023 1442 by Kimberly Collins RN  Safety Promotion/Fall Prevention:   assistive device/personal items within reach   clutter free environment maintained   fall prevention program maintained   lighting adjusted   nonskid shoes/slippers when out of bed   room organization consistent   safety round/check completed  Taken 9/22/2023 1204 by Kimberly Collins RN  Safety Promotion/Fall Prevention:   assistive device/personal items within reach   clutter free environment maintained   fall prevention program maintained   lighting adjusted   nonskid shoes/slippers when out of bed   room organization consistent   safety round/check completed  Taken 9/22/2023 1037 by Kimberly Collins RN  Safety Promotion/Fall Prevention:   assistive device/personal items within reach   clutter free environment maintained   fall prevention program maintained   lighting adjusted   nonskid shoes/slippers when out of bed   room organization  consistent   safety round/check completed  Taken 9/22/2023 0813 by Kimberly Collins RN  Safety Promotion/Fall Prevention:   clutter free environment maintained   assistive device/personal items within reach   fall prevention program maintained   lighting adjusted   nonskid shoes/slippers when out of bed   room organization consistent   safety round/check completed     Problem: Skin Injury Risk Increased  Goal: Skin Health and Integrity  Intervention: Optimize Skin Protection  Recent Flowsheet Documentation  Taken 9/22/2023 1644 by Kimberly Collins RN  Pressure Reduction Techniques: frequent weight shift encouraged  Head of Bed (HOB) Positioning: HOB elevated  Pressure Reduction Devices: pressure-redistributing mattress utilized  Taken 9/22/2023 1442 by Kimberly Collins RN  Head of Bed (HOB) Positioning: HOB elevated  Taken 9/22/2023 1204 by Kimberly Collins RN  Pressure Reduction Techniques: frequent weight shift encouraged  Head of Bed (HOB) Positioning: HOB elevated  Pressure Reduction Devices: pressure-redistributing mattress utilized  Taken 9/22/2023 1037 by Kimberly Collins RN  Head of Bed (HOB) Positioning: HOB elevated  Taken 9/22/2023 0813 by Kimberly Collins RN  Pressure Reduction Techniques: frequent weight shift encouraged  Head of Bed (HOB) Positioning: HOB elevated  Pressure Reduction Devices: pressure-redistributing mattress utilized     Problem: Breathing Pattern Ineffective  Goal: Effective Breathing Pattern  Intervention: Promote Improved Breathing Pattern  Recent Flowsheet Documentation  Taken 9/22/2023 1644 by Kimberly Collins RN  Head of Bed (HOB) Positioning: HOB elevated  Taken 9/22/2023 1442 by Kimberly Collins RN  Head of Bed (HOB) Positioning: HOB elevated  Taken 9/22/2023 1204 by Kimberly Collins RN  Head of Bed (HOB) Positioning: HOB elevated  Taken 9/22/2023 1037 by Kimberly Collins RN  Head of Bed (HOB) Positioning: HOB elevated  Taken 9/22/2023 0813 by Kimberly Collins RN  Supportive Measures: active listening  utilized  Head of Bed (HOB) Positioning: HOB elevated     Problem: Adult Inpatient Plan of Care  Goal: Absence of Hospital-Acquired Illness or Injury  Intervention: Identify and Manage Fall Risk  Recent Flowsheet Documentation  Taken 9/22/2023 1644 by Kimberly Collins RN  Safety Promotion/Fall Prevention:   assistive device/personal items within reach   clutter free environment maintained   fall prevention program maintained   lighting adjusted   nonskid shoes/slippers when out of bed   room organization consistent   safety round/check completed  Taken 9/22/2023 1442 by Kimberly Collins RN  Safety Promotion/Fall Prevention:   assistive device/personal items within reach   clutter free environment maintained   fall prevention program maintained   lighting adjusted   nonskid shoes/slippers when out of bed   room organization consistent   safety round/check completed  Taken 9/22/2023 1204 by Kimberly Collins RN  Safety Promotion/Fall Prevention:   assistive device/personal items within reach   clutter free environment maintained   fall prevention program maintained   lighting adjusted   nonskid shoes/slippers when out of bed   room organization consistent   safety round/check completed  Taken 9/22/2023 1037 by Kimberly Collins RN  Safety Promotion/Fall Prevention:   assistive device/personal items within reach   clutter free environment maintained   fall prevention program maintained   lighting adjusted   nonskid shoes/slippers when out of bed   room organization consistent   safety round/check completed  Taken 9/22/2023 0813 by Kimberly Collins RN  Safety Promotion/Fall Prevention:   clutter free environment maintained   assistive device/personal items within reach   fall prevention program maintained   lighting adjusted   nonskid shoes/slippers when out of bed   room organization consistent   safety round/check completed  Intervention: Prevent Skin Injury  Recent Flowsheet Documentation  Taken 9/22/2023 1644 by Kimberly Collins  RN  Body Position: position changed independently  Taken 9/22/2023 1442 by Kimberly Collins RN  Body Position: position changed independently  Taken 9/22/2023 1204 by Kimberly Collins RN  Body Position: position changed independently  Taken 9/22/2023 1037 by Kimberly Collins RN  Body Position: position changed independently  Taken 9/22/2023 0813 by Kimberly Collins RN  Body Position: position changed independently  Intervention: Prevent and Manage VTE (Venous Thromboembolism) Risk  Recent Flowsheet Documentation  Taken 9/22/2023 1644 by Kimberly Collins RN  Activity Management: activity encouraged  Range of Motion: active ROM (range of motion) encouraged  Taken 9/22/2023 1442 by Kimberly Collins RN  Activity Management: activity encouraged  Taken 9/22/2023 1204 by Kimberly Collins RN  Activity Management: activity encouraged  Taken 9/22/2023 1037 by Kimberly Collins RN  Activity Management: activity encouraged  Taken 9/22/2023 0813 by Kimberly Collins RN  Activity Management: activity encouraged  Range of Motion: active ROM (range of motion) encouraged  Intervention: Prevent Infection  Recent Flowsheet Documentation  Taken 9/22/2023 1644 by Kimberly Collins RN  Infection Prevention:   hand hygiene promoted   personal protective equipment utilized  Taken 9/22/2023 1442 by Kimberly Collins RN  Infection Prevention:   hand hygiene promoted   personal protective equipment utilized  Taken 9/22/2023 1204 by Kimberly Collins RN  Infection Prevention: hand hygiene promoted  Taken 9/22/2023 1037 by Kimberly Collins RN  Infection Prevention:   hand hygiene promoted   personal protective equipment utilized  Taken 9/22/2023 0813 by Kimberly Collins RN  Infection Prevention:   hand hygiene promoted   personal protective equipment utilized  Goal: Optimal Comfort and Wellbeing  Intervention: Provide Person-Centered Care  Recent Flowsheet Documentation  Taken 9/22/2023 1644 by Kimberly Collins RN  Trust Relationship/Rapport: care explained  Taken 9/22/2023 1204 by Dennis  Kimberly RN  Trust Relationship/Rapport: care explained  Taken 9/22/2023 0813 by Kimberly Collins, CELESTE  Trust Relationship/Rapport: care explained   Goal Outcome Evaluation:      Patient with no complaints throughout the day. Awaiting placement. Patient resting at this time. Call light in reach.

## 2023-09-22 NOTE — PROGRESS NOTES
[facility] Medicine Services   Daily Progress Note      Patient Name: Jermaine Black  : 1926  MRN: [id number]  Primary Care Physician:  Provider, No Known  Date of admission: 2023      Subjective      Chief Complaint: Shortness of breath, fall    Patient seen and examined this morning.  Doing well this morning, denies any complaints.  Breathing is stable.    Pertinent positives as noted in HPI/subjective.  All other systems were reviewed and are negative.      Objective      Vitals:   Temp:  [97.7 °F (36.5 °C)-98.2 °F (36.8 °C)] 97.7 °F (36.5 °C)  Heart Rate:  [75-94] 76  Resp:  [14-24] 14  BP: (116-146)/(70-88) 139/82  Flow (L/min):  [2] 2    Physical Exam:    General: Awake, alert, elderly male, lying in bed, NAD  Cardiovascular: Regular rate and rhythm, no murmurs  Respiratory: Clear to auscultation bilaterally, no wheezing or rales, unlabored breathing  Abdomen: Soft, nontender, positive bowel sounds, no guarding  Musculoskeletal: Generalized weakness, no other gross deformities  Skin: Warm, dry, intact         Result Review    Result Review:  I have personally reviewed the results from the time of this admission to 2023 09:56 EDT and agree with these findings:  [x]  Laboratory  []  Microbiology  []  Radiology  []  EKG/Telemetry   []  Cardiology/Vascular   []  Pathology  []  Old records  []  Other:    Wounds (last 24 hours)       LDA Wound       Row Name 23 1606 23 [id number]       [REMOVED] Wound 23 coccyx    Wound - Properties Group Placement Date: 23  -EV Placement Time:   -EV Location: coccyx  -EV Removal Date: 23  -AB Removal Time: 7  -AB    Pressure Injury Stage O  MASD  -AH -- --    Closure -- Adhesive bandage  -SE Adhesive bandage  -SE    Base -- pink;dry  -SE pink;dry  -SE    Dressing Care silicone  -AH -- --    Retired Wound - Properties Group Placement Date: 23  -EV Placement Time:   -EV Location: coccyx  -EV  Removal Date: 09/21/23  -AB Removal Time: 0007 -AB    Retired Wound - Properties Group Date first assessed: 09/01/23  -EV Time first assessed: 1731  -EV Location: coccyx  -EV Resolution Date: 09/21/23  -AB Resolution Time: 0007 -AB              User Key  (r) = Recorded By, (t) = Taken By, (c) = Cosigned By      Initials Name Provider Type    Nikunj Mercedes LPN Licensed Nurse    Sintia Thomas LPN Licensed Nurse    David Freire RN Registered Nurse    Mike Virk RN Registered Nurse                      Assessment & Plan      Brief Patient Summary:  Jermaine Black is a 89 y.o. male with a history of COPD, CHF with unknown EF, Parkinson's Disease, Hypothyroidism, CAD, Hyperlipidemia, GERD, BPH, and HTN who came to the ER after a fall on the morning of admission. Patient states that his legs gave out and he fell onto his knees but did not hit his head. He further states he has had a cough for the past 3 weeks but no sputum production. In the ER patient noted with COVID-19 with atypical pneumonia as well as possible CHF exacerbation with elevated BNP.  Hypoxic requiring 2 to 3 L nasal cannula, started on dexamethasone and Lasix and pulmonology also consulted.      albuterol sulfate HFA, 2 puff, Inhalation, 4x Daily - RT  budesonide, 0.5 mg, Nebulization, BID - RT  carbidopa-levodopa ER, 1 tablet, Oral, TID  cefuroxime, 500 mg, Oral, Q12H  dexAMETHasone, 6 mg, Oral, Daily With Breakfast  enoxaparin, 40 mg, Subcutaneous, Q24H  erythromycin, 1 application , Both Eyes, Nightly  finasteride, 5 mg, Oral, Daily  furosemide, 40 mg, Oral, Daily  isosorbide mononitrate, 60 mg, Oral, Daily  levothyroxine, 75 mcg, Oral, Daily With Breakfast  metoprolol succinate XL, 50 mg, Oral, Daily  nystatin, 1 application , Topical, Q12H  rosuvastatin, 10 mg, Oral, Daily  tamsulosin, 0.4 mg, Oral, Daily  vitamin D3, 5,000 Units, Oral, Daily       Pharmacy to Dose enoxaparin (LOVENOX),          I have utilized all  available, immediate resources to obtain, update, or review the patient's current medications including all prescriptions, over-the-counter products, herbals, cannabis/cannabidiol products, and vitamin.mineral/dietary (nutritional) supplements.    Active Hospital Problems:  Active Hospital Problems    Diagnosis     **COVID-19     COVID-19 virus detected      Plan:     Acute hypoxemic respiratory failure, improved  COVID-19 infection with atypical pneumonia  -Chest x-ray findings noted  -Continue bronchodilators and dexamethasone  -Improved and weaned down to room air now  -Encourage spirometry  -On oral antibiotics per pulmonology  -Pulmonology following    Acute on chronic HFrEF  Severe aortic stenosis  -Echo this admission shows EF of 40 to 45% and severe aortic stenosis noted  -Medical management now for diuresis with Lasix as tolerated, monitor I's and O's  -Due to advanced age, patient likely not a candidate for any aggressive therapy for aortic stenosis  -Continue GDMT as tolerated  -Cardiology following    PAF  SSS s/p pacemaker  -Diagnosed with A-fib during this admission, rate well controlled  -Continue beta-blocker and monitor on telemetry  -Patient very high fall risk, not a candidate for full anticoagulation with also recent history of questionable SAH noted during previous admission  -Cardiology consulted    Elevated D-dimer  -Likely secondary to COVID, less likely VTE  -CTA chest negative for PE  -Monitor    Parkinson's disease  Deconditioning  -Continue home meds  -PT/OT    Hypertension  Hyperlipidemia  -Blood pressure controlled  -Continue home meds as tolerated, monitor    Hypothyroidism  GERD  BPH  -Continue home meds as tolerated, monitor    DVT prophylaxis  -Lovenox    CODE STATUS:    Medical Intervention Limits: NO intubation (DNI)  Level Of Support Discussed With: Health Care Surrogate  Code Status (Patient has no pulse and is not breathing): No CPR (Do Not Attempt to Resuscitate)  Medical  Interventions (Patient has pulse or is breathing): Limited Support      Disposition: SNF placement    Electronically signed by James Allison DO, 09/22/23, 09:56 EDT.  [facility] Hospitalist Team      Part of this note may be an electronic transcription/translation of spoken language to printed text using the Dragon Dictation System.

## 2023-09-22 NOTE — PLAN OF CARE
Problem: Fall Injury Risk  Goal: Absence of Fall and Fall-Related Injury  Intervention: Identify and Manage Contributors  Description: Develop a fall prevention plan with the patient and caregiver/family.  Provide reorientation, appropriate sensory stimulation and routines with changes in mental status to decrease risk of fall.  Promote use of personal vision and auditory aids.  Assess assistance level required for safe and effective self-care; provide support as needed, such as toileting, mobilization. For age 65 and older, implement timed toileting with assistance.  Encourage physical activity, such as performance of mobility and self-care at highest level of patient ability, multicomponent exercise program and provision of appropriate assistive devices.  If fall occurs, assess the severity of injury; implement fall injury protocol. Determine the cause and revise fall injury prevention plan.  Regularly review medication contribution to fall risk; adjust medication administration times to minimize risk of falling.  Consider risk related to polypharmacy and age.  Balance adequate pain management with potential for oversedation.  Recent Flowsheet Documentation  Taken 9/22/2023 0202 by Nikunj Alicea LPN  Medication Review/Management: medications reviewed  Taken 9/22/2023 0015 by Nikunj Alicea LPN  Medication Review/Management: medications reviewed  Taken 9/21/2023 2200 by Nikunj Alicea LPN  Medication Review/Management: medications reviewed  Taken 9/21/2023 2010 by Nikunj Alicea LPN  Medication Review/Management: medications reviewed  Intervention: Promote Injury-Free Environment  Description: Provide a safe, barrier-free environment that encourages independent activity.  Keep care area uncluttered and well-lighted.  Determine need for increased observation or monitoring.  Avoid use of devices that minimize mobility, such as restraints or indwelling urinary catheter.  Recent Flowsheet  Documentation  Taken 9/22/2023 0202 by Nikunj Alicea LPN  Safety Promotion/Fall Prevention:   safety round/check completed   room organization consistent   nonskid shoes/slippers when out of bed   muscle strengthening facilitated   mobility aid in reach   lighting adjusted   fall prevention program maintained   clutter free environment maintained   assistive device/personal items within reach   activity supervised  Taken 9/22/2023 0015 by Nikunj Alicea LPN  Safety Promotion/Fall Prevention:   safety round/check completed   room organization consistent   nonskid shoes/slippers when out of bed   muscle strengthening facilitated   mobility aid in reach   lighting adjusted   fall prevention program maintained   clutter free environment maintained   assistive device/personal items within reach   activity supervised  Taken 9/21/2023 2200 by Nikunj Alicea LPN  Safety Promotion/Fall Prevention:   safety round/check completed   room organization consistent   nonskid shoes/slippers when out of bed   muscle strengthening facilitated   mobility aid in reach   lighting adjusted   fall prevention program maintained   clutter free environment maintained   assistive device/personal items within reach   activity supervised  Taken 9/21/2023 2010 by Nikunj Alicea LPN  Safety Promotion/Fall Prevention:   safety round/check completed   room organization consistent   nonskid shoes/slippers when out of bed   muscle strengthening facilitated   mobility aid in reach   lighting adjusted   fall prevention program maintained   clutter free environment maintained   assistive device/personal items within reach   activity supervised     Problem: Skin Injury Risk Increased  Goal: Skin Health and Integrity  Intervention: Optimize Skin Protection  Description: Perform a full pressure injury risk assessment, as indicated by screening, upon admission to care unit.  Reassess skin (injury risk, full inspection) frequently  (e.g., scheduled interval, with change in condition) to provide optimal early detection and prevention.  Maintain adequate tissue perfusion (e.g., encourage fluid balance; avoid crossing legs, constrictive clothing or devices) to promote tissue oxygenation.  Maintain head of bed at lowest degree of elevation tolerated, considering medical condition and other restrictions.  Avoid positioning onto an area that remains reddened.  Minimize incontinence and moisture (e.g., toileting schedule; moisture-wicking pad, diaper or incontinence collection device; skin moisture barrier).  Cleanse skin promptly and gently when soiled utilizing a pH-balanced cleanser.  Relieve and redistribute pressure (e.g., scheduled position changes, weight shifts, use of support surface, medical device repositioning, protective dressing application, use of positioning device, microclimate control, use of pressure-injury-monitor  Encourage increased activity, such as sitting in a chair at the bedside or early mobilization, when able to tolerate.  Recent Flowsheet Documentation  Taken 9/22/2023 0202 by Nikunj Alicea LPN  Head of Bed (HOB) Positioning: HOB at 20-30 degrees  Taken 9/22/2023 0015 by Nikunj Alicea LPN  Head of Bed (HOB) Positioning: HOB at 20-30 degrees  Taken 9/21/2023 2200 by Nikunj Alicea LPN  Head of Bed (HOB) Positioning: HOB at 30-45 degrees  Taken 9/21/2023 2010 by Nikunj Alicea LPN  Pressure Reduction Techniques: frequent weight shift encouraged  Head of Bed (HOB) Positioning: HOB at 20-30 degrees  Pressure Reduction Devices: pressure-redistributing mattress utilized     Problem: Breathing Pattern Ineffective  Goal: Effective Breathing Pattern  Intervention: Promote Improved Breathing Pattern  Description: Assess and monitor airway, breathing and circulation; maintain close surveillance for deterioration.  Maintain head of bed elevation with regular position changes to minimize  ventilation-perfusion mismatch and breathlessness.  Evaluate psychosocial factors that may contribute to the anxiety-dyspnea cycle.  Acknowledge, normalize and validate intensity and complexity of patient and support system response to fear, anxiety and patient's breathlessness.  Utilize nonpharmacologic measures, such as controlled breathing and relaxation techniques, in addition to medication, to reduce pain and anxiety.  Promote early mobility or ambulation; match activity to ability and tolerance.  Initiate cough-enhancement and airway-clearance techniques with instruction.  Provide oxygen therapy judiciously to avoid hyperoxemia; adjust to achieve oxygenation goal.  Consider noninvasive or invasive positive pressure ventilation to enhance oxygenation, ventilation and reduce work of breathing.  Recent Flowsheet Documentation  Taken 9/22/2023 0202 by Nikunj Alicea LPN  Head of Bed (HOB) Positioning: HOB at 20-30 degrees  Taken 9/22/2023 0015 by Nikunj Alicea LPN  Head of Bed (HOB) Positioning: HOB at 20-30 degrees  Taken 9/21/2023 2200 by Nikunj Alicea LPN  Head of Bed (HOB) Positioning: HOB at 30-45 degrees  Taken 9/21/2023 2010 by Nikunj Alicea LPN  Head of Bed (HOB) Positioning: HOB at 20-30 degrees     Problem: Adult Inpatient Plan of Care  Goal: Absence of Hospital-Acquired Illness or Injury  Intervention: Identify and Manage Fall Risk  Description: Perform standard risk assessment on admission using a validated tool or comprehensive approach appropriate to the patient; reassess fall risk frequently, with change in status or transfer to another level of care.  Communicate fall injury risk to interprofessional healthcare team.  Determine need for increased observation, equipment and environmental modification, such as low bed, signage and supportive, nonskid footwear.  Adjust safety measures to individual developmental age, stage and identified risk factors.  Reinforce the  importance of safety and physical activity with patient and family.  Perform regular intentional rounding to assess need for position change, pain assessment and personal needs, including assistance with toileting.  Recent Flowsheet Documentation  Taken 9/22/2023 0202 by Nikunj Alicea LPN  Safety Promotion/Fall Prevention:   safety round/check completed   room organization consistent   nonskid shoes/slippers when out of bed   muscle strengthening facilitated   mobility aid in reach   lighting adjusted   fall prevention program maintained   clutter free environment maintained   assistive device/personal items within reach   activity supervised  Taken 9/22/2023 0015 by Nikunj Alicea LPN  Safety Promotion/Fall Prevention:   safety round/check completed   room organization consistent   nonskid shoes/slippers when out of bed   muscle strengthening facilitated   mobility aid in reach   lighting adjusted   fall prevention program maintained   clutter free environment maintained   assistive device/personal items within reach   activity supervised  Taken 9/21/2023 2200 by Nikunj Alicea LPN  Safety Promotion/Fall Prevention:   safety round/check completed   room organization consistent   nonskid shoes/slippers when out of bed   muscle strengthening facilitated   mobility aid in reach   lighting adjusted   fall prevention program maintained   clutter free environment maintained   assistive device/personal items within reach   activity supervised  Taken 9/21/2023 2010 by Nikunj Alicea LPN  Safety Promotion/Fall Prevention:   safety round/check completed   room organization consistent   nonskid shoes/slippers when out of bed   muscle strengthening facilitated   mobility aid in reach   lighting adjusted   fall prevention program maintained   clutter free environment maintained   assistive device/personal items within reach   activity supervised  Intervention: Prevent Skin Injury  Description: Perform a  screening for skin injury risk, such as pressure or moisture associated skin damage on admission and at regular intervals throughout hospital stay.  Keep all areas of skin (especially folds) clean and dry.  Maintain adequate skin hydration.  Relieve and redistribute pressure and protect bony prominences; implement measures based on patient-specific risk factors.  Match turning and repositioning schedule to clinical condition.  Encourage weight shift frequently; assist with reposition if unable to complete independently.  Float heels off bed; avoid pressure on the Achilles tendon.  Keep skin free from extended contact with medical devices.  Encourage functional activity and mobility, as early as tolerated.  Use aids (e.g., slide boards, mechanical lift) during transfer.  Recent Flowsheet Documentation  Taken 9/22/2023 0202 by Nikunj Alicea LPN  Body Position: position changed independently  Taken 9/22/2023 0015 by Nikunj Alicea LPN  Body Position: position changed independently  Taken 9/21/2023 2200 by Nikunj Alicea LPN  Body Position: position changed independently  Taken 9/21/2023 2010 by Nikunj Alicea LPN  Body Position: weight shifting  Intervention: Prevent and Manage VTE (Venous Thromboembolism) Risk  Description: Assess for VTE (venous thromboembolism) risk.  Encourage and assist with early ambulation.  Initiate and maintain compression or other therapy, as indicated, based on identified risk in accordance with organizational protocol and provider order.  Encourage both active and passive leg exercises while in bed, if unable to ambulate.  Recent Flowsheet Documentation  Taken 9/22/2023 0202 by Nikunj Alicea LPN  Activity Management: up to bedside commode  Taken 9/22/2023 0015 by Nikunj Alicea LPN  Activity Management: activity encouraged  Taken 9/21/2023 2200 by Nikunj Alicea LPN  Activity Management:   up in chair   activity encouraged  Taken 9/21/2023 2010 by  Nikunj Alicea LPN  Activity Management:   activity encouraged   ambulated to bathroom  Intervention: Prevent Infection  Description: Maintain skin and mucous membrane integrity; promote hand, oral and pulmonary hygiene.  Optimize fluid balance, nutrition, sleep and glycemic control to maximize infection resistance.  Identify potential sources of infection early to prevent or mitigate progression of infection (e.g., wound, lines, devices).  Evaluate ongoing need for invasive devices; remove promptly when no longer indicated.  Recent Flowsheet Documentation  Taken 9/22/2023 0202 by Nikunj Alicea LPN  Infection Prevention:   visitors restricted/screened   single patient room provided   rest/sleep promoted   personal protective equipment utilized   hand hygiene promoted  Taken 9/22/2023 0015 by Nikunj Alicea LPN  Infection Prevention:   visitors restricted/screened   rest/sleep promoted   single patient room provided   personal protective equipment utilized   hand hygiene promoted  Taken 9/21/2023 2200 by Nikunj Alicea LPN  Infection Prevention:   visitors restricted/screened   single patient room provided   rest/sleep promoted   hand hygiene promoted   personal protective equipment utilized   equipment surfaces disinfected  Taken 9/21/2023 2010 by Nikunj Alicea LPN  Infection Prevention:   visitors restricted/screened   rest/sleep promoted   single patient room provided   hand hygiene promoted   personal protective equipment utilized  Goal: Optimal Comfort and Wellbeing  Intervention: Monitor Pain and Promote Comfort  Description: Assess pain level, treatment efficacy and patient response at regular intervals using a consistent pain scale.  Consider the presence and impact of preexisting chronic pain.  Encourage patient and caregiver involvement in pain assessment, interventions and safety measures.  Recent Flowsheet Documentation  Taken 9/22/2023 0015 by Nikunj Alicea  LPN  Pain Management Interventions:   quiet environment facilitated   see MAR   position adjusted   pain management plan reviewed with patient/caregiver   medication offered but refused   Goal Outcome Evaluation:cont to progress toward goal. Plan to [facility] to [facility] in , enchance isolation for covid, , cont to be fall risk near nurse station and bed alarms active will monitor

## 2023-09-22 NOTE — CASE MANAGEMENT/SOCIAL WORK
Continued Stay Note  LO[facility] Atwood     Patient Name: Jermaine Black  MRN: [id number]  Today's Date: 9/22/2023    Admit Date: 9/19/2023    Plan: D/C Plan: [facility]; Pre-cert initiated this am; PASSR approved; Transport TBD   Discharge Plan       Row Name 09/22/23 1515       Plan    Plan Comments Barrier to D/C: Awaiting Ins.Authorization               Expected Discharge Date and Time       Expected Discharge Date Expected Discharge Time    Sep 23 2023               Smiley Palencia RN

## 2023-09-23 PROBLEM — E44.0 MODERATE MALNUTRITION: Status: ACTIVE | Noted: 2023-09-23

## 2023-09-23 LAB
ANION GAP SERPL CALCULATED.3IONS-SCNC: 7 MMOL/L (ref 5–15)
BASOPHILS # BLD AUTO: 0 10*3/MM3 (ref 0–0.2)
BASOPHILS NFR BLD AUTO: 0.1 % (ref 0–1.5)
BUN SERPL-MCNC: 29 MG/DL (ref 8–23)
BUN/CREAT SERPL: 27.6 (ref 7–25)
CALCIUM SPEC-SCNC: 8 MG/DL (ref 8.2–9.6)
CHLORIDE SERPL-SCNC: 99 MMOL/L (ref 98–107)
CO2 SERPL-SCNC: 35 MMOL/L (ref 22–29)
CREAT SERPL-MCNC: 1.05 MG/DL (ref 0.76–1.27)
DEPRECATED RDW RBC AUTO: 52.9 FL (ref 37–54)
EGFRCR SERPLBLD CKD-EPI 2021: 65 ML/MIN/1.73
EOSINOPHIL # BLD AUTO: 0 10*3/MM3 (ref 0–0.4)
EOSINOPHIL NFR BLD AUTO: 0 % (ref 0.3–6.2)
ERYTHROCYTE [DISTWIDTH] IN BLOOD BY AUTOMATED COUNT: 14.8 % (ref 12.3–15.4)
GLUCOSE SERPL-MCNC: 139 MG/DL (ref 65–99)
HCT VFR BLD AUTO: 34.8 % (ref 37.5–51)
HGB BLD-MCNC: 11.7 G/DL (ref 13–17.7)
LYMPHOCYTES # BLD AUTO: 0.6 10*3/MM3 (ref 0.7–3.1)
LYMPHOCYTES NFR BLD AUTO: 11 % (ref 19.6–45.3)
MCH RBC QN AUTO: 32.5 PG (ref 26.6–33)
MCHC RBC AUTO-ENTMCNC: 33.5 G/DL (ref 31.5–35.7)
MCV RBC AUTO: 96.9 FL (ref 79–97)
MONOCYTES # BLD AUTO: 0.9 10*3/MM3 (ref 0.1–0.9)
MONOCYTES NFR BLD AUTO: 14.7 % (ref 5–12)
NEUTROPHILS NFR BLD AUTO: 4.3 10*3/MM3 (ref 1.7–7)
NEUTROPHILS NFR BLD AUTO: 74.2 % (ref 42.7–76)
NRBC BLD AUTO-RTO: 0.1 /100 WBC (ref 0–0.2)
PLATELET # BLD AUTO: 132 10*3/MM3 (ref 140–450)
PMV BLD AUTO: 9.7 FL (ref 6–12)
POTASSIUM SERPL-SCNC: 4 MMOL/L (ref 3.5–5.2)
RBC # BLD AUTO: 3.59 10*6/MM3 (ref 4.14–5.8)
SODIUM SERPL-SCNC: 141 MMOL/L (ref 136–145)
WBC NRBC COR # BLD: 5.8 10*3/MM3 (ref 3.4–10.8)

## 2023-09-23 PROCEDURE — 25010000002 ENOXAPARIN PER 10 MG

## 2023-09-23 PROCEDURE — 80048 BASIC METABOLIC PNL TOTAL CA: CPT | Performed by: INTERNAL MEDICINE

## 2023-09-23 PROCEDURE — 63710000001 DEXAMETHASONE PER 0.25 MG: Performed by: INTERNAL MEDICINE

## 2023-09-23 PROCEDURE — 85025 COMPLETE CBC W/AUTO DIFF WBC: CPT | Performed by: INTERNAL MEDICINE

## 2023-09-23 RX ORDER — POLYETHYLENE GLYCOL 3350 17 G/17G
17 POWDER, FOR SOLUTION ORAL DAILY
Status: DISCONTINUED | OUTPATIENT
Start: 2023-09-23 | End: 2023-09-26 | Stop reason: HOSPADM

## 2023-09-23 RX ORDER — ALBUTEROL SULFATE 90 UG/1
2 AEROSOL, METERED RESPIRATORY (INHALATION) EVERY 6 HOURS PRN
Status: DISCONTINUED | OUTPATIENT
Start: 2023-09-23 | End: 2023-09-26 | Stop reason: HOSPADM

## 2023-09-23 RX ORDER — BUDESONIDE AND FORMOTEROL FUMARATE DIHYDRATE 160; 4.5 UG/1; UG/1
2 AEROSOL RESPIRATORY (INHALATION)
Status: DISCONTINUED | OUTPATIENT
Start: 2023-09-24 | End: 2023-09-26 | Stop reason: HOSPADM

## 2023-09-23 RX ORDER — AMOXICILLIN 250 MG
1 CAPSULE ORAL 2 TIMES DAILY
Status: DISCONTINUED | OUTPATIENT
Start: 2023-09-23 | End: 2023-09-26 | Stop reason: HOSPADM

## 2023-09-23 RX ADMIN — NYSTATIN 1 APPLICATION: 100000 CREAM TOPICAL at 09:37

## 2023-09-23 RX ADMIN — METOPROLOL SUCCINATE 50 MG: 50 TABLET, EXTENDED RELEASE ORAL at 09:32

## 2023-09-23 RX ADMIN — CARBIDOPA AND LEVODOPA 1 TABLET: 25; 100 TABLET, EXTENDED RELEASE ORAL at 20:50

## 2023-09-23 RX ADMIN — ERYTHROMYCIN 1 APPLICATION: 5 OINTMENT OPHTHALMIC at 20:50

## 2023-09-23 RX ADMIN — TAMSULOSIN HYDROCHLORIDE 0.4 MG: 0.4 CAPSULE ORAL at 09:32

## 2023-09-23 RX ADMIN — CARBIDOPA AND LEVODOPA 1 TABLET: 25; 100 TABLET, EXTENDED RELEASE ORAL at 09:32

## 2023-09-23 RX ADMIN — ISOSORBIDE MONONITRATE 60 MG: 60 TABLET, EXTENDED RELEASE ORAL at 09:38

## 2023-09-23 RX ADMIN — ROSUVASTATIN 10 MG: 10 TABLET, FILM COATED ORAL at 09:37

## 2023-09-23 RX ADMIN — FUROSEMIDE 40 MG: 40 TABLET ORAL at 09:32

## 2023-09-23 RX ADMIN — LEVOTHYROXINE SODIUM 75 MCG: 0.07 TABLET ORAL at 05:47

## 2023-09-23 RX ADMIN — Medication 5000 UNITS: at 09:32

## 2023-09-23 RX ADMIN — FINASTERIDE 5 MG: 5 TABLET, FILM COATED ORAL at 09:32

## 2023-09-23 RX ADMIN — CEFUROXIME AXETIL 500 MG: 500 TABLET ORAL at 09:32

## 2023-09-23 RX ADMIN — CARBIDOPA AND LEVODOPA 1 TABLET: 25; 100 TABLET, EXTENDED RELEASE ORAL at 16:29

## 2023-09-23 RX ADMIN — SENNOSIDES AND DOCUSATE SODIUM 1 TABLET: 50; 8.6 TABLET ORAL at 20:51

## 2023-09-23 RX ADMIN — NYSTATIN 1 APPLICATION: 100000 CREAM TOPICAL at 20:51

## 2023-09-23 RX ADMIN — CEFUROXIME AXETIL 500 MG: 500 TABLET ORAL at 20:50

## 2023-09-23 RX ADMIN — POLYETHYLENE GLYCOL 3350 17 G: 17 POWDER, FOR SOLUTION ORAL at 10:39

## 2023-09-23 RX ADMIN — DEXAMETHASONE 6 MG: 4 TABLET ORAL at 09:33

## 2023-09-23 RX ADMIN — ENOXAPARIN SODIUM 40 MG: 40 INJECTION, SOLUTION SUBCUTANEOUS at 16:29

## 2023-09-23 RX ADMIN — SENNOSIDES AND DOCUSATE SODIUM 1 TABLET: 50; 8.6 TABLET ORAL at 10:39

## 2023-09-23 NOTE — PROGRESS NOTES
Daily Progress Note          Assessment    Hypoxemia  COVID-19  Atypical pneumonia  COPD  CHF  BPH  HTN  Parkinson's disease   Hypothyroidism  GERD  CAD   Results for orders placed during the hospital encounter of 09/19/23    Adult Transthoracic Echo Limited W/ Cont if Necessary Per Protocol    Interpretation Summary    Left ventricular ejection fraction appears to be 46 - 50%.    Severe aortic valve stenosis is present.       PLAN:  Respiratory status is improving and currently on RA, ok to transfer to SNF/rehab from pulmonary stand point.  Antibiotics: Rocephin changed to Ceftin  Remdesivir is not needed at this point since the COVID pneumonia is mild  Dexamethasone completed  Bronchodilators  Inhaled corticosteroids  Incentive spirometer  Electrolytes/ glycemic control  Thyroid hormone replacement  BP control  Crestor  DVT  prophylaxis-Lovenox            LOS: 4 days     Subjective     Afebrile, patient reports no cough and less shortness of breath    Objective     Vital signs for last 24 hours:  Vitals:    09/22/23 2125 09/22/23 2300 09/23/23 0318 09/23/23 0935   BP:  138/83 146/79 144/90   BP Location:   Left arm Right arm   Patient Position:   Lying Lying   Pulse: 84 88 86 84   Resp:   15 19   Temp:  98.4 °F (36.9 °C) 98.3 °F (36.8 °C) 98.1 °F (36.7 °C)   TempSrc:   Oral Oral   SpO2: 91% 94% 96% 94%   Weight:   72.6 kg (160 lb 0.9 oz)    Height:           Intake/Output last 3 shifts:  I/O last 3 completed shifts:  In: 1220 [P.O.:1220]  Out: 200 [Urine:200]  Intake/Output this shift:  I/O this shift:  In: 140 [P.O.:140]  Out: -       Radiology  Imaging Results (Last 24 Hours)       ** No results found for the last 24 hours. **            Labs:  Results from last 7 days   Lab Units 09/23/23  0145   WBC 10*3/mm3 5.80   HEMOGLOBIN g/dL 11.7*   HEMATOCRIT % 34.8*   PLATELETS 10*3/mm3 132*       Results from last 7 days   Lab Units 09/23/23  0145   SODIUM mmol/L 141   POTASSIUM mmol/L 4.0   CHLORIDE mmol/L 99   CO2  mmol/L 35.0*   BUN mg/dL 29*   CREATININE mg/dL 1.05   CALCIUM mg/dL 8.0*   GLUCOSE mg/dL 139*               Results from last 7 days   Lab Units 09/19/23 2011 09/19/23  0931 09/19/23  0729   HSTROP T ng/L 116* 66* 76*                             Meds:   SCHEDULE  albuterol sulfate HFA, 2 puff, Inhalation, 4x Daily - RT  budesonide, 0.5 mg, Nebulization, BID - RT  carbidopa-levodopa ER, 1 tablet, Oral, TID  cefuroxime, 500 mg, Oral, Q12H  enoxaparin, 40 mg, Subcutaneous, Q24H  erythromycin, 1 application , Both Eyes, Nightly  finasteride, 5 mg, Oral, Daily  furosemide, 40 mg, Oral, Daily  isosorbide mononitrate, 60 mg, Oral, Daily  levothyroxine, 75 mcg, Oral, Daily With Breakfast  metoprolol succinate XL, 50 mg, Oral, Daily  nystatin, 1 application , Topical, Q12H  polyethylene glycol, 17 g, Oral, Daily  rosuvastatin, 10 mg, Oral, Daily  senna-docusate sodium, 1 tablet, Oral, BID  tamsulosin, 0.4 mg, Oral, Daily  vitamin D3, 5,000 Units, Oral, Daily      Infusions  Pharmacy to Dose enoxaparin (LOVENOX),       PRNs    acetaminophen    benzonatate    Calcium Replacement - Follow Nurse / BPA Driven Protocol    hydrALAZINE    Magnesium Standard Dose Replacement - Follow Nurse / BPA Driven Protocol    nitroglycerin    ondansetron    Pharmacy to Dose enoxaparin (LOVENOX)    Phosphorus Replacement - Follow Nurse / BPA Driven Protocol    Potassium Replacement - Follow Nurse / BPA Driven Protocol    Physical Exam:  General Appearance:  Alert   HEENT:  Normocephalic, without obvious abnormality, L mild erythema and dryness in conjunctiva,   nares normal, no drainage     Neck:  Supple, symmetrical, trachea midline.   Lungs /Chest wall:   Mild bilateral basal rhonchi, respirations unlabored, symmetrical wall movement.     Heart:  Regular rate and rhythm, S1 S2 normal  Abdomen: Soft, non-tender, no masses, no organomegaly.    Extremities: No edema, no clubbing or cyanosis     ROS  Constitutional: Negative for chills, fever and  positive for malaise/fatigue.   HENT: Negative.    Eyes: Negative.    Cardiovascular: Negative.    Respiratory: Positive for improving cough and shortness of breath.    Skin: Negative.    Musculoskeletal: Negative.    Gastrointestinal: Negative.    Genitourinary: Negative.    Neurological: Generalized weakness      I reviewed the recent clinical results    Part of this note may be an electronic transcription/translation of spoken language to printed text using the Dragon Dictation System.

## 2023-09-23 NOTE — PROGRESS NOTES
[facility] Medicine Services   Daily Progress Note      Patient Name: Jermaine Black  : 1926  MRN: [id number]  Primary Care Physician:  Provider, No Known  Date of admission: 2023      Subjective      Chief Complaint: Shortness of breath, fall    Patient seen and examined this morning.  Doing okay this morning, respiratory status stable.  Awaiting rehab placement      Pertinent positives as noted in HPI/subjective.  All other systems were reviewed and are negative.      Objective      Vitals:   Temp:  [97.8 °F (36.6 °C)-98.4 °F (36.9 °C)] 98.3 °F (36.8 °C)  Heart Rate:  [70-88] 84  Resp:  [12-19] 19  BP: ()/(58-90) 144/90  Flow (L/min):  [2] 2    Physical Exam:    General: Awake, alert, elderly male, lying in bed, NAD  Cardiovascular: Regular rate and rhythm, no murmurs  Respiratory: Clear to auscultation bilaterally, no wheezing or rales, unlabored breathing  Abdomen: Soft, nontender, positive bowel sounds, no guarding  Musculoskeletal: Generalized weakness, no other gross deformities  Skin: Warm, dry, intact         Result Review    Result Review:  I have personally reviewed the results from the time of this admission to 2023 10:28 EDT and agree with these findings:  [x]  Laboratory  []  Microbiology  []  Radiology  []  EKG/Telemetry   []  Cardiology/Vascular   []  Pathology  []  Old records  []  Other:    Wounds (last 24 hours)               Assessment & Plan      Brief Patient Summary:  Jermaine Black is a 89 y.o. male with a history of COPD, CHF with unknown EF, Parkinson's Disease, Hypothyroidism, CAD, Hyperlipidemia, GERD, BPH, and HTN who came to the ER after a fall on the morning of admission. Patient states that his legs gave out and he fell onto his knees but did not hit his head. He further states he has had a cough for the past 3 weeks but no sputum production. In the ER patient noted with COVID-19 with atypical pneumonia as well as possible CHF exacerbation with  elevated BNP.  Hypoxic requiring 2 to 3 L nasal cannula, started on dexamethasone and Lasix and pulmonology also consulted.      albuterol sulfate HFA, 2 puff, Inhalation, 4x Daily - RT  budesonide, 0.5 mg, Nebulization, BID - RT  carbidopa-levodopa ER, 1 tablet, Oral, TID  cefuroxime, 500 mg, Oral, Q12H  enoxaparin, 40 mg, Subcutaneous, Q24H  erythromycin, 1 application , Both Eyes, Nightly  finasteride, 5 mg, Oral, Daily  furosemide, 40 mg, Oral, Daily  isosorbide mononitrate, 60 mg, Oral, Daily  levothyroxine, 75 mcg, Oral, Daily With Breakfast  metoprolol succinate XL, 50 mg, Oral, Daily  nystatin, 1 application , Topical, Q12H  polyethylene glycol, 17 g, Oral, Daily  rosuvastatin, 10 mg, Oral, Daily  senna-docusate sodium, 1 tablet, Oral, BID  tamsulosin, 0.4 mg, Oral, Daily  vitamin D3, 5,000 Units, Oral, Daily       Pharmacy to Dose enoxaparin (LOVENOX),          I have utilized all available, immediate resources to obtain, update, or review the patient's current medications including all prescriptions, over-the-counter products, herbals, cannabis/cannabidiol products, and vitamin.mineral/dietary (nutritional) supplements.    Active Hospital Problems:  Active Hospital Problems    Diagnosis     **COVID-19     Moderate malnutrition     COVID-19 virus detected      Plan:     Acute hypoxemic respiratory failure, improved  COVID-19 infection with atypical pneumonia  -Chest x-ray findings noted  -Continue bronchodilators and dexamethasone  -Improved and weaned down to room air now  -Encourage spirometry  -On oral antibiotics per pulmonology  -Okay to discharge per pulmonology  -Awaiting placement     Acute on chronic HFrEF  Severe aortic stenosis  -Echo this admission shows EF of 40 to 45% and severe aortic stenosis noted  -Medical management now for diuresis with Lasix as tolerated, monitor I's and O's  -Due to advanced age, patient likely not a candidate for any aggressive therapy for aortic stenosis  -Continue  GDMT as tolerated  -Cardiology signed off    PAF  SSS s/p pacemaker  -Diagnosed with A-fib during this admission, rate well controlled  -Continue beta-blocker and monitor on telemetry  -Patient very high fall risk, not a candidate for full anticoagulation with also recent history of questionable SAH noted during previous admission  -Cardiology s/o    Elevated D-dimer  -Likely secondary to COVID, less likely VTE  -CTA chest negative for PE  -Monitor    Parkinson's disease  Deconditioning  -Continue home meds  -PT/OT    Hypertension  Hyperlipidemia  -Blood pressure controlled  -Continue home meds as tolerated, monitor    Hypothyroidism  GERD  BPH  -Continue home meds as tolerated, monitor    DVT prophylaxis  -Lovenox    CODE STATUS:    Medical Intervention Limits: NO intubation (DNI)  Level Of Support Discussed With: Health Care Surrogate  Code Status (Patient has no pulse and is not breathing): No CPR (Do Not Attempt to Resuscitate)  Medical Interventions (Patient has pulse or is breathing): Limited Support      Disposition: SNF placement.  Medically stable for discharge.    Electronically signed by James Allison DO, 09/23/23, 10:28 EDT.  [facility] Hospitalist Team      Part of this note may be an electronic transcription/translation of spoken language to printed text using the Dragon Dictation System.

## 2023-09-23 NOTE — PLAN OF CARE
Goal Outcome Evaluation:           Progress: improving  Outcome Evaluation: pt rested well stayed on room air with good oxygen sats. walk to bathroom with 1 person assist.  Will continue to monitor

## 2023-09-24 LAB
ANION GAP SERPL CALCULATED.3IONS-SCNC: 8 MMOL/L (ref 5–15)
BASOPHILS # BLD AUTO: 0 10*3/MM3 (ref 0–0.2)
BASOPHILS NFR BLD AUTO: 0 % (ref 0–1.5)
BUN SERPL-MCNC: 28 MG/DL (ref 8–23)
BUN/CREAT SERPL: 28 (ref 7–25)
CALCIUM SPEC-SCNC: 8.4 MG/DL (ref 8.2–9.6)
CHLORIDE SERPL-SCNC: 98 MMOL/L (ref 98–107)
CO2 SERPL-SCNC: 34 MMOL/L (ref 22–29)
CREAT SERPL-MCNC: 1 MG/DL (ref 0.76–1.27)
DEPRECATED RDW RBC AUTO: 51.6 FL (ref 37–54)
EGFRCR SERPLBLD CKD-EPI 2021: 68.9 ML/MIN/1.73
EOSINOPHIL # BLD AUTO: 0 10*3/MM3 (ref 0–0.4)
EOSINOPHIL NFR BLD AUTO: 0 % (ref 0.3–6.2)
ERYTHROCYTE [DISTWIDTH] IN BLOOD BY AUTOMATED COUNT: 14.6 % (ref 12.3–15.4)
GLUCOSE SERPL-MCNC: 132 MG/DL (ref 65–99)
HCT VFR BLD AUTO: 34.8 % (ref 37.5–51)
HGB BLD-MCNC: 11.9 G/DL (ref 13–17.7)
LYMPHOCYTES # BLD AUTO: 0.6 10*3/MM3 (ref 0.7–3.1)
LYMPHOCYTES NFR BLD AUTO: 12.7 % (ref 19.6–45.3)
MCH RBC QN AUTO: 33 PG (ref 26.6–33)
MCHC RBC AUTO-ENTMCNC: 34.3 G/DL (ref 31.5–35.7)
MCV RBC AUTO: 96.1 FL (ref 79–97)
MONOCYTES # BLD AUTO: 0.7 10*3/MM3 (ref 0.1–0.9)
MONOCYTES NFR BLD AUTO: 15.9 % (ref 5–12)
NEUTROPHILS NFR BLD AUTO: 3.3 10*3/MM3 (ref 1.7–7)
NEUTROPHILS NFR BLD AUTO: 71.4 % (ref 42.7–76)
NRBC BLD AUTO-RTO: 0.2 /100 WBC (ref 0–0.2)
PLATELET # BLD AUTO: 132 10*3/MM3 (ref 140–450)
PMV BLD AUTO: 9.3 FL (ref 6–12)
POTASSIUM SERPL-SCNC: 4 MMOL/L (ref 3.5–5.2)
RBC # BLD AUTO: 3.62 10*6/MM3 (ref 4.14–5.8)
SODIUM SERPL-SCNC: 140 MMOL/L (ref 136–145)
WBC NRBC COR # BLD: 4.6 10*3/MM3 (ref 3.4–10.8)

## 2023-09-24 PROCEDURE — 85025 COMPLETE CBC W/AUTO DIFF WBC: CPT | Performed by: INTERNAL MEDICINE

## 2023-09-24 PROCEDURE — 94799 UNLISTED PULMONARY SVC/PX: CPT

## 2023-09-24 PROCEDURE — 97116 GAIT TRAINING THERAPY: CPT

## 2023-09-24 PROCEDURE — 97112 NEUROMUSCULAR REEDUCATION: CPT

## 2023-09-24 PROCEDURE — 97530 THERAPEUTIC ACTIVITIES: CPT

## 2023-09-24 PROCEDURE — 25010000002 ENOXAPARIN PER 10 MG

## 2023-09-24 PROCEDURE — 94664 DEMO&/EVAL PT USE INHALER: CPT

## 2023-09-24 PROCEDURE — 94761 N-INVAS EAR/PLS OXIMETRY MLT: CPT

## 2023-09-24 PROCEDURE — 80048 BASIC METABOLIC PNL TOTAL CA: CPT | Performed by: INTERNAL MEDICINE

## 2023-09-24 RX ADMIN — NYSTATIN 1 APPLICATION: 100000 CREAM TOPICAL at 22:14

## 2023-09-24 RX ADMIN — TAMSULOSIN HYDROCHLORIDE 0.4 MG: 0.4 CAPSULE ORAL at 07:37

## 2023-09-24 RX ADMIN — FUROSEMIDE 40 MG: 40 TABLET ORAL at 07:37

## 2023-09-24 RX ADMIN — FINASTERIDE 5 MG: 5 TABLET, FILM COATED ORAL at 07:37

## 2023-09-24 RX ADMIN — ENOXAPARIN SODIUM 40 MG: 40 INJECTION, SOLUTION SUBCUTANEOUS at 16:00

## 2023-09-24 RX ADMIN — POLYETHYLENE GLYCOL 3350 17 G: 17 POWDER, FOR SOLUTION ORAL at 07:42

## 2023-09-24 RX ADMIN — BUDESONIDE AND FORMOTEROL FUMARATE DIHYDRATE 2 PUFF: 160; 4.5 AEROSOL RESPIRATORY (INHALATION) at 21:37

## 2023-09-24 RX ADMIN — SENNOSIDES AND DOCUSATE SODIUM 1 TABLET: 50; 8.6 TABLET ORAL at 07:37

## 2023-09-24 RX ADMIN — BUDESONIDE AND FORMOTEROL FUMARATE DIHYDRATE 2 PUFF: 160; 4.5 AEROSOL RESPIRATORY (INHALATION) at 10:23

## 2023-09-24 RX ADMIN — ERYTHROMYCIN 1 APPLICATION: 5 OINTMENT OPHTHALMIC at 22:14

## 2023-09-24 RX ADMIN — METOPROLOL SUCCINATE 50 MG: 50 TABLET, EXTENDED RELEASE ORAL at 07:37

## 2023-09-24 RX ADMIN — CARBIDOPA AND LEVODOPA 1 TABLET: 25; 100 TABLET, EXTENDED RELEASE ORAL at 22:13

## 2023-09-24 RX ADMIN — LEVOTHYROXINE SODIUM 75 MCG: 0.07 TABLET ORAL at 06:33

## 2023-09-24 RX ADMIN — CARBIDOPA AND LEVODOPA 1 TABLET: 25; 100 TABLET, EXTENDED RELEASE ORAL at 07:37

## 2023-09-24 RX ADMIN — CARBIDOPA AND LEVODOPA 1 TABLET: 25; 100 TABLET, EXTENDED RELEASE ORAL at 16:00

## 2023-09-24 RX ADMIN — Medication 5000 UNITS: at 07:37

## 2023-09-24 RX ADMIN — ROSUVASTATIN 10 MG: 10 TABLET, FILM COATED ORAL at 07:37

## 2023-09-24 RX ADMIN — ISOSORBIDE MONONITRATE 60 MG: 60 TABLET, EXTENDED RELEASE ORAL at 07:37

## 2023-09-24 RX ADMIN — BENZONATATE 100 MG: 100 CAPSULE ORAL at 07:47

## 2023-09-24 RX ADMIN — NYSTATIN 1 APPLICATION: 100000 CREAM TOPICAL at 07:42

## 2023-09-24 RX ADMIN — SENNOSIDES AND DOCUSATE SODIUM 1 TABLET: 50; 8.6 TABLET ORAL at 22:13

## 2023-09-24 NOTE — THERAPY TREATMENT NOTE
"Subjective: Pt agreeable to therapeutic plan of care. Pt stated he has very poor vision, can see things but not make them out. Stated he uses rwx at home.    Objective:     Bed mobility - SBA  Transfers - CGA and with rolling walker  Ambulation - 35 feet CGA and with rolling walker and vc's for direction due to poor vision    Vitals: WNL    Pain: 0 VAS     Education: Provided education on the importance of mobility in the acute care setting, Verbal/Tactile Cues, Transfer Training, and Gait Training    Assessment: Jermaine Black presents with functional mobility impairments which indicate the need for skilled intervention. Tolerating session today without incident. Encouraged pt to try and scan ahead of him to see where to walk. Presented with slowed jeannine, shuffle gait but no LOB noted. Plans on rehab at [place].Will continue to follow and progress as tolerated.     Plan/Recommendations:   Moderate Intensity Therapy recommended post-acute care. This is recommended as therapy feels the patient would require 3-4 days per week and wouldn't tolerate \"3 hour daily\" rehab intensity. SNF would be the preferred choice. If the patient does not agree to SNF, arrange HH or OP depending on home bound status. If patient is medically complex, consider LTACH.. Pt requires no DME at discharge.     Pt desires Skilled Rehab placement at discharge. Pt cooperative; agreeable to therapeutic recommendations and plan of care.         Basic Mobility 6-click:  Rollin = Total, A lot = 2, A little = 3; 4 = None  Supine>Sit:   1 = Total, A lot = 2, A little = 3; 4 = None   Sit>Stand with arms:  1 = Total, A lot = 2, A little = 3; 4 = None  Bed>Chair:   1 = Total, A lot = 2, A little = 3; 4 = None  Ambulate in room:  1 = Total, A lot = 2, A little = 3; 4 = None  3-5 Steps with railin = Total, A lot = 2, A little = 3; 4 = None  Score: 18    Post-Tx Position: Supine with HOB Elevated, Alarms activated, and Call light and personal " items within reach  PPE: gloves, gown, eye protection, and N95

## 2023-09-24 NOTE — CASE MANAGEMENT/SOCIAL WORK
Continued Stay Note  LOIDA [facility]     Patient Name: Jermaine Black  MRN: [id number]  Today's Date: 9/24/2023    Admit Date: 9/19/2023    Plan: D/C Plan: [facility] accepted; Pre-cert initiated this am; PASSR approved; Transport TBD   Discharge Plan       Row Name 09/24/23 1434       Plan    Plan Comments URCM followed up on pending auth. Rachaelert is still pending at this time.

## 2023-09-24 NOTE — PROGRESS NOTES
[facility] Medicine Services   Daily Progress Note      Patient Name: Jermaine Black  : 1926  MRN: [id number]  Primary Care Physician:  Provider, No Known  Date of admission: 2023      Subjective      Chief Complaint: Shortness of breath, fall    Patient seen and examined this morning.  Doing well this morning, denies any complaints.  Awaiting rehab placement.    Pertinent positives as noted in HPI/subjective.  All other systems were reviewed and are negative.      Objective      Vitals:   Temp:  [97.6 °F (36.4 °C)-99 °F (37.2 °C)] 97.6 °F (36.4 °C)  Heart Rate:  [72-79] 73  Resp:  [13-19] 13  BP: (107-162)/(67-97) 141/97    Physical Exam:    General: Awake, alert, elderly male, lying in bed, NAD  Cardiovascular: Regular rate and rhythm, no murmurs  Respiratory: Clear to auscultation bilaterally, no wheezing or rales, unlabored breathing  Abdomen: Soft, nontender, positive bowel sounds, no guarding  Musculoskeletal: Generalized weakness, no other gross deformities  Skin: Warm, dry, intact         Result Review    Result Review:  I have personally reviewed the results from the time of this admission to 2023 10:25 EDT and agree with these findings:  [x]  Laboratory  []  Microbiology  []  Radiology  []  EKG/Telemetry   []  Cardiology/Vascular   []  Pathology  []  Old records  []  Other:    Wounds (last 24 hours)               Assessment & Plan      Brief Patient Summary:  Jermaine Black is a 89 y.o. male with a history of COPD, CHF with unknown EF, Parkinson's Disease, Hypothyroidism, CAD, Hyperlipidemia, GERD, BPH, and HTN who came to the ER after a fall on the morning of admission. Patient states that his legs gave out and he fell onto his knees but did not hit his head. He further states he has had a cough for the past 3 weeks but no sputum production. In the ER patient noted with COVID-19 with atypical pneumonia as well as possible CHF exacerbation with elevated BNP.  Hypoxic  requiring 2 to 3 L nasal cannula, started on dexamethasone and Lasix and pulmonology also consulted.      budesonide-formoterol, 2 puff, Inhalation, BID - RT  carbidopa-levodopa ER, 1 tablet, Oral, TID  enoxaparin, 40 mg, Subcutaneous, Q24H  erythromycin, 1 application , Both Eyes, Nightly  finasteride, 5 mg, Oral, Daily  furosemide, 40 mg, Oral, Daily  isosorbide mononitrate, 60 mg, Oral, Daily  levothyroxine, 75 mcg, Oral, Daily With Breakfast  metoprolol succinate XL, 50 mg, Oral, Daily  nystatin, 1 application , Topical, Q12H  polyethylene glycol, 17 g, Oral, Daily  rosuvastatin, 10 mg, Oral, Daily  senna-docusate sodium, 1 tablet, Oral, BID  tamsulosin, 0.4 mg, Oral, Daily  vitamin D3, 5,000 Units, Oral, Daily       Pharmacy to Dose enoxaparin (LOVENOX),          I have utilized all available, immediate resources to obtain, update, or review the patient's current medications including all prescriptions, over-the-counter products, herbals, cannabis/cannabidiol products, and vitamin.mineral/dietary (nutritional) supplements.    Active Hospital Problems:  Active Hospital Problems    Diagnosis     **COVID-19     Moderate malnutrition     COVID-19 virus detected      Plan:     Acute hypoxemic respiratory failure, improved  COVID-19 infection with atypical pneumonia  -Chest x-ray findings noted  -Continue bronchodilators and dexamethasone  -Improved and weaned down to room air now  -Encourage spirometry  -On oral antibiotics per pulmonology  -Okay to discharge per pulmonology  -Awaiting placement     Acute on chronic HFrEF  Severe aortic stenosis  -Echo this admission shows EF of 40 to 45% and severe aortic stenosis noted  -Medical management now for diuresis with Lasix as tolerated, monitor I's and O's  -Due to advanced age, patient likely not a candidate for any aggressive therapy for aortic stenosis  -Continue GDMT as tolerated  -Cardiology signed off    PAF  SSS s/p pacemaker  -Diagnosed with A-fib during this  admission, rate well controlled  -Continue beta-blocker and monitor on telemetry  -Patient very high fall risk, not a candidate for full anticoagulation with also recent history of questionable SAH noted during previous admission  -Cardiology s/o    Elevated D-dimer  -Likely secondary to COVID, less likely VTE  -CTA chest negative for PE  -Monitor    Parkinson's disease  Deconditioning  -Continue home meds  -PT/OT    Hypertension  Hyperlipidemia  -Blood pressure controlled  -Continue home meds as tolerated, monitor    Hypothyroidism  GERD  BPH  -Continue home meds as tolerated, monitor    DVT prophylaxis  -Lovenox    CODE STATUS:    Medical Intervention Limits: NO intubation (DNI)  Level Of Support Discussed With: Health Care Surrogate  Code Status (Patient has no pulse and is not breathing): No CPR (Do Not Attempt to Resuscitate)  Medical Interventions (Patient has pulse or is breathing): Limited Support      Disposition: SNF placement.  Medically stable for discharge.    Electronically signed by James Allison DO, 09/24/23, 10:25 EDT.  [facility] Hospitalist Team      Part of this note may be an electronic transcription/translation of spoken language to printed text using the Dragon Dictation System.

## 2023-09-24 NOTE — PLAN OF CARE
Goal Outcome Evaluation:      Pt medications given, pt at bedside to assess for placement, VSS, non concerns noted, will continue to monitor

## 2023-09-24 NOTE — PROGRESS NOTES
Daily Progress Note          Assessment    Hypoxemia  COVID-19  Atypical pneumonia  COPD  CHF  BPH  HTN  Parkinson's disease   Hypothyroidism  GERD  CAD   Results for orders placed during the hospital encounter of 09/19/23    Adult Transthoracic Echo Limited W/ Cont if Necessary Per Protocol    Interpretation Summary    Left ventricular ejection fraction appears to be 46 - 50%.    Severe aortic valve stenosis is present.       PLAN:  Respiratory status is stable and currently on RA, ok to transfer to SNF/rehab from pulmonary stand point.  Antibiotics: Rocephin changed to Ceftin and now completed    Dexamethasone completed  Bronchodilators  Inhaled corticosteroids  Incentive spirometer  Electrolytes/ glycemic control  Thyroid hormone replacement  BP control  Crestor  Diuresis  DVT  prophylaxis-Lovenox            LOS: 5 days     Subjective     Afebrile, patient reports  cough but no shortness of breath    Objective     Vital signs for last 24 hours:  Vitals:    09/23/23 2042 09/24/23 0041 09/24/23 0445 09/24/23 0737   BP: 107/77 162/85 160/90 141/97   BP Location: Right arm      Patient Position: Lying      Pulse: 72 75 74 73   Resp: 13      Temp: 99 °F (37.2 °C) 98 °F (36.7 °C) 97.6 °F (36.4 °C)    TempSrc: Oral      SpO2:  95% 98%    Weight:   71.3 kg (157 lb 3 oz)    Height:           Intake/Output last 3 shifts:  I/O last 3 completed shifts:  In: 980 [P.O.:980]  Out: 400 [Urine:400]  Intake/Output this shift:  No intake/output data recorded.      Radiology  Imaging Results (Last 24 Hours)       ** No results found for the last 24 hours. **            Labs:  Results from last 7 days   Lab Units 09/24/23  0021   WBC 10*3/mm3 4.60   HEMOGLOBIN g/dL 11.9*   HEMATOCRIT % 34.8*   PLATELETS 10*3/mm3 132*       Results from last 7 days   Lab Units 09/24/23  0021   SODIUM mmol/L 140   POTASSIUM mmol/L 4.0   CHLORIDE mmol/L 98   CO2 mmol/L 34.0*   BUN mg/dL 28*   CREATININE mg/dL 1.00   CALCIUM mg/dL 8.4   GLUCOSE mg/dL  132*               Results from last 7 days   Lab Units 09/19/23 2011 09/19/23  0931 09/19/23  0729   HSTROP T ng/L 116* 66* 76*                             Meds:   SCHEDULE  budesonide-formoterol, 2 puff, Inhalation, BID - RT  carbidopa-levodopa ER, 1 tablet, Oral, TID  enoxaparin, 40 mg, Subcutaneous, Q24H  erythromycin, 1 application , Both Eyes, Nightly  finasteride, 5 mg, Oral, Daily  furosemide, 40 mg, Oral, Daily  isosorbide mononitrate, 60 mg, Oral, Daily  levothyroxine, 75 mcg, Oral, Daily With Breakfast  metoprolol succinate XL, 50 mg, Oral, Daily  nystatin, 1 application , Topical, Q12H  polyethylene glycol, 17 g, Oral, Daily  rosuvastatin, 10 mg, Oral, Daily  senna-docusate sodium, 1 tablet, Oral, BID  tamsulosin, 0.4 mg, Oral, Daily  vitamin D3, 5,000 Units, Oral, Daily      Infusions  Pharmacy to Dose enoxaparin (LOVENOX),       PRNs    acetaminophen    albuterol sulfate HFA    benzonatate    Calcium Replacement - Follow Nurse / BPA Driven Protocol    hydrALAZINE    Magnesium Standard Dose Replacement - Follow Nurse / BPA Driven Protocol    nitroglycerin    ondansetron    Pharmacy to Dose enoxaparin (LOVENOX)    Phosphorus Replacement - Follow Nurse / BPA Driven Protocol    Potassium Replacement - Follow Nurse / BPA Driven Protocol    Physical Exam:  General Appearance:  Alert   HEENT:  Normocephalic, without obvious abnormality, L mild erythema and dryness in conjunctiva,   nares normal, no drainage     Neck:  Supple, symmetrical, trachea midline.   Lungs /Chest wall:   Mild bilateral basal rhonchi, respirations unlabored, symmetrical wall movement.     Heart:  Regular rate and rhythm, S1 S2 normal  Abdomen: Soft, non-tender, no masses, no organomegaly.    Extremities: No edema, no clubbing or cyanosis     ROS  Constitutional: Negative for chills, fever and positive for malaise/fatigue.   HENT: Negative.    Eyes: Negative.    Cardiovascular: Negative.    Respiratory: Positive for cough and negative  for shortness of breath.    Skin: Negative.    Musculoskeletal: Negative.    Gastrointestinal: Negative.    Genitourinary: Negative.    Neurological: Generalized weakness      I reviewed the recent clinical results    Part of this note may be an electronic transcription/translation of spoken language to printed text using the Dragon Dictation System.

## 2023-09-24 NOTE — PLAN OF CARE
Assessment: Jermaine Black presents with functional mobility impairments which indicate the need for skilled intervention. Tolerating session today without incident. Encouraged pt to try and scan ahead of him to see where to walk. Presented with slowed jeannine, shuffle gait but no LOB noted. Plans on rehab at [place].Will continue to follow and progress as tolerated.

## 2023-09-24 NOTE — PLAN OF CARE
Goal Outcome Evaluation:  Plan of Care Reviewed With: patient           Outcome Evaluation: Pt has slept throughout the night, no c/o pain or discomfort at this time. Currently waiting on insurance approval to [facility]. Will continue to monitor.

## 2023-09-25 LAB
ANION GAP SERPL CALCULATED.3IONS-SCNC: 5 MMOL/L (ref 5–15)
BASOPHILS # BLD AUTO: 0 10*3/MM3 (ref 0–0.2)
BASOPHILS NFR BLD AUTO: 0.1 % (ref 0–1.5)
BUN SERPL-MCNC: 24 MG/DL (ref 8–23)
BUN/CREAT SERPL: 23.1 (ref 7–25)
CALCIUM SPEC-SCNC: 8.7 MG/DL (ref 8.2–9.6)
CHLORIDE SERPL-SCNC: 98 MMOL/L (ref 98–107)
CO2 SERPL-SCNC: 37 MMOL/L (ref 22–29)
CREAT SERPL-MCNC: 1.04 MG/DL (ref 0.76–1.27)
DEPRECATED RDW RBC AUTO: 50.3 FL (ref 37–54)
EGFRCR SERPLBLD CKD-EPI 2021: 65.7 ML/MIN/1.73
EOSINOPHIL # BLD AUTO: 0.1 10*3/MM3 (ref 0–0.4)
EOSINOPHIL NFR BLD AUTO: 1 % (ref 0.3–6.2)
ERYTHROCYTE [DISTWIDTH] IN BLOOD BY AUTOMATED COUNT: 14.4 % (ref 12.3–15.4)
GLUCOSE SERPL-MCNC: 106 MG/DL (ref 65–99)
HCT VFR BLD AUTO: 38.5 % (ref 37.5–51)
HGB BLD-MCNC: 12.9 G/DL (ref 13–17.7)
LYMPHOCYTES # BLD AUTO: 0.9 10*3/MM3 (ref 0.7–3.1)
LYMPHOCYTES NFR BLD AUTO: 15.3 % (ref 19.6–45.3)
MCH RBC QN AUTO: 32.4 PG (ref 26.6–33)
MCHC RBC AUTO-ENTMCNC: 33.6 G/DL (ref 31.5–35.7)
MCV RBC AUTO: 96.4 FL (ref 79–97)
MONOCYTES # BLD AUTO: 0.6 10*3/MM3 (ref 0.1–0.9)
MONOCYTES NFR BLD AUTO: 10.9 % (ref 5–12)
NEUTROPHILS NFR BLD AUTO: 4.2 10*3/MM3 (ref 1.7–7)
NEUTROPHILS NFR BLD AUTO: 72.7 % (ref 42.7–76)
NRBC BLD AUTO-RTO: 0.1 /100 WBC (ref 0–0.2)
PLATELET # BLD AUTO: 136 10*3/MM3 (ref 140–450)
PMV BLD AUTO: 9.6 FL (ref 6–12)
POTASSIUM SERPL-SCNC: 4.2 MMOL/L (ref 3.5–5.2)
RBC # BLD AUTO: 4 10*6/MM3 (ref 4.14–5.8)
SODIUM SERPL-SCNC: 140 MMOL/L (ref 136–145)
WBC NRBC COR # BLD: 5.8 10*3/MM3 (ref 3.4–10.8)

## 2023-09-25 PROCEDURE — 97110 THERAPEUTIC EXERCISES: CPT

## 2023-09-25 PROCEDURE — 94799 UNLISTED PULMONARY SVC/PX: CPT

## 2023-09-25 PROCEDURE — 25010000002 ENOXAPARIN PER 10 MG

## 2023-09-25 PROCEDURE — 99232 SBSQ HOSP IP/OBS MODERATE 35: CPT | Performed by: INTERNAL MEDICINE

## 2023-09-25 PROCEDURE — 80048 BASIC METABOLIC PNL TOTAL CA: CPT | Performed by: INTERNAL MEDICINE

## 2023-09-25 PROCEDURE — 97116 GAIT TRAINING THERAPY: CPT

## 2023-09-25 PROCEDURE — 94761 N-INVAS EAR/PLS OXIMETRY MLT: CPT

## 2023-09-25 PROCEDURE — 85025 COMPLETE CBC W/AUTO DIFF WBC: CPT | Performed by: INTERNAL MEDICINE

## 2023-09-25 PROCEDURE — 97530 THERAPEUTIC ACTIVITIES: CPT

## 2023-09-25 RX ORDER — ASPIRIN 81 MG/1
81 TABLET, CHEWABLE ORAL DAILY
Status: DISCONTINUED | OUTPATIENT
Start: 2023-09-25 | End: 2023-09-26 | Stop reason: HOSPADM

## 2023-09-25 RX ADMIN — ENOXAPARIN SODIUM 40 MG: 40 INJECTION, SOLUTION SUBCUTANEOUS at 15:09

## 2023-09-25 RX ADMIN — METOPROLOL SUCCINATE 50 MG: 50 TABLET, EXTENDED RELEASE ORAL at 07:18

## 2023-09-25 RX ADMIN — LEVOTHYROXINE SODIUM 75 MCG: 0.07 TABLET ORAL at 06:10

## 2023-09-25 RX ADMIN — SENNOSIDES AND DOCUSATE SODIUM 1 TABLET: 50; 8.6 TABLET ORAL at 20:42

## 2023-09-25 RX ADMIN — BUDESONIDE AND FORMOTEROL FUMARATE DIHYDRATE 2 PUFF: 160; 4.5 AEROSOL RESPIRATORY (INHALATION) at 19:19

## 2023-09-25 RX ADMIN — POLYETHYLENE GLYCOL 3350 17 G: 17 POWDER, FOR SOLUTION ORAL at 07:17

## 2023-09-25 RX ADMIN — FUROSEMIDE 40 MG: 40 TABLET ORAL at 07:18

## 2023-09-25 RX ADMIN — ROSUVASTATIN 10 MG: 10 TABLET, FILM COATED ORAL at 07:17

## 2023-09-25 RX ADMIN — CARBIDOPA AND LEVODOPA 1 TABLET: 25; 100 TABLET, EXTENDED RELEASE ORAL at 20:42

## 2023-09-25 RX ADMIN — TAMSULOSIN HYDROCHLORIDE 0.4 MG: 0.4 CAPSULE ORAL at 07:25

## 2023-09-25 RX ADMIN — CARBIDOPA AND LEVODOPA 1 TABLET: 25; 100 TABLET, EXTENDED RELEASE ORAL at 07:18

## 2023-09-25 RX ADMIN — SENNOSIDES AND DOCUSATE SODIUM 1 TABLET: 50; 8.6 TABLET ORAL at 07:17

## 2023-09-25 RX ADMIN — ERYTHROMYCIN 1 APPLICATION: 5 OINTMENT OPHTHALMIC at 20:42

## 2023-09-25 RX ADMIN — BUDESONIDE AND FORMOTEROL FUMARATE DIHYDRATE 2 PUFF: 160; 4.5 AEROSOL RESPIRATORY (INHALATION) at 08:46

## 2023-09-25 RX ADMIN — FINASTERIDE 5 MG: 5 TABLET, FILM COATED ORAL at 07:18

## 2023-09-25 RX ADMIN — ASPIRIN 81 MG: 81 TABLET, CHEWABLE ORAL at 17:33

## 2023-09-25 RX ADMIN — CARBIDOPA AND LEVODOPA 1 TABLET: 25; 100 TABLET, EXTENDED RELEASE ORAL at 15:09

## 2023-09-25 RX ADMIN — Medication 5000 UNITS: at 07:19

## 2023-09-25 RX ADMIN — ISOSORBIDE MONONITRATE 60 MG: 60 TABLET, EXTENDED RELEASE ORAL at 07:17

## 2023-09-25 RX ADMIN — NYSTATIN 1 APPLICATION: 100000 CREAM TOPICAL at 07:22

## 2023-09-25 RX ADMIN — NYSTATIN 1 APPLICATION: 100000 CREAM TOPICAL at 20:42

## 2023-09-25 RX ADMIN — BENZONATATE 100 MG: 100 CAPSULE ORAL at 20:41

## 2023-09-25 NOTE — PROGRESS NOTES
Daily Progress Note          Assessment    Acute hypoxic respiratory insufficiency  COVID-19  Atypical pneumonia  COPD  CHF  BPH  HTN  Parkinson's disease   Hypothyroidism  GERD  CAD   Results for orders placed during the hospital encounter of 09/19/23    Adult Transthoracic Echo Limited W/ Cont if Necessary Per Protocol    Interpretation Summary    Left ventricular ejection fraction appears to be 46 - 50%.    Severe aortic valve stenosis is present.       PLAN:    Oxygen titration currently on 2 L   antibiotics: Rocephin changed to Ceftin , completed    Dexamethasone completed    Bronchodilators  Inhaled corticosteroids  Incentive spirometer  Electrolytes/ glycemic control  Thyroid hormone replacement  BP control  Crestor  Diuresis  DVT  prophylaxis-Lovenox            LOS: 6 days     Subjective     Afebrile, patient reports  cough but no shortness of breath    Objective     Vital signs for last 24 hours:  Vitals:    09/24/23 2140 09/25/23 0040 09/25/23 0321 09/25/23 0717   BP:  134/75 132/77 117/81   BP Location:   Right arm Right arm   Patient Position:   Lying Lying   Pulse: 72 83 71 73   Resp: 20  19 15   Temp:  98.2 °F (36.8 °C) 98.4 °F (36.9 °C) 98.6 °F (37 °C)   TempSrc:   Oral Oral   SpO2: 95% 97% 97% 93%   Weight:   71.5 kg (157 lb 10.1 oz)    Height:           Intake/Output last 3 shifts:  I/O last 3 completed shifts:  In: 720 [P.O.:720]  Out: 850 [Urine:850]  Intake/Output this shift:  No intake/output data recorded.      Radiology  Imaging Results (Last 24 Hours)       ** No results found for the last 24 hours. **            Labs:  Results from last 7 days   Lab Units 09/25/23  0214   WBC 10*3/mm3 5.80   HEMOGLOBIN g/dL 12.9*   HEMATOCRIT % 38.5   PLATELETS 10*3/mm3 136*       Results from last 7 days   Lab Units 09/25/23  0214   SODIUM mmol/L 140   POTASSIUM mmol/L 4.2   CHLORIDE mmol/L 98   CO2 mmol/L 37.0*   BUN mg/dL 24*   CREATININE mg/dL 1.04   CALCIUM mg/dL 8.7   GLUCOSE mg/dL 106*                Results from last 7 days   Lab Units 09/19/23 2011 09/19/23  0931 09/19/23  0729   HSTROP T ng/L 116* 66* 76*                             Meds:   SCHEDULE  budesonide-formoterol, 2 puff, Inhalation, BID - RT  carbidopa-levodopa ER, 1 tablet, Oral, TID  enoxaparin, 40 mg, Subcutaneous, Q24H  erythromycin, 1 application , Both Eyes, Nightly  finasteride, 5 mg, Oral, Daily  furosemide, 40 mg, Oral, Daily  isosorbide mononitrate, 60 mg, Oral, Daily  levothyroxine, 75 mcg, Oral, Daily With Breakfast  metoprolol succinate XL, 50 mg, Oral, Daily  nystatin, 1 application , Topical, Q12H  polyethylene glycol, 17 g, Oral, Daily  rosuvastatin, 10 mg, Oral, Daily  senna-docusate sodium, 1 tablet, Oral, BID  tamsulosin, 0.4 mg, Oral, Daily  vitamin D3, 5,000 Units, Oral, Daily      Infusions  Pharmacy to Dose enoxaparin (LOVENOX),       PRNs    acetaminophen    albuterol sulfate HFA    benzonatate    Calcium Replacement - Follow Nurse / BPA Driven Protocol    hydrALAZINE    Magnesium Standard Dose Replacement - Follow Nurse / BPA Driven Protocol    nitroglycerin    ondansetron    Pharmacy to Dose enoxaparin (LOVENOX)    Phosphorus Replacement - Follow Nurse / BPA Driven Protocol    Potassium Replacement - Follow Nurse / BPA Driven Protocol    Physical Exam:  General Appearance:  Alert   HEENT:  Normocephalic, without obvious abnormality, L mild erythema and dryness in conjunctiva,   nares normal, no drainage     Neck:  Supple, symmetrical, trachea midline.   Lungs /Chest wall:   Mild bilateral basal rhonchi, respirations unlabored, symmetrical wall movement.     Heart:  Regular rate and rhythm, S1 S2 normal  Abdomen: Soft, non-tender, no masses, no organomegaly.    Extremities: No edema, no clubbing or cyanosis     ROS  Constitutional: Negative for chills, fever and positive for malaise/fatigue.   HENT: Negative.    Eyes: Negative.    Cardiovascular: Negative.    Respiratory: Positive for cough and negative for shortness of  breath.    Skin: Negative.    Musculoskeletal: Negative.    Gastrointestinal: Negative.    Genitourinary: Negative.    Neurological: Generalized weakness      I reviewed the recent clinical results    Part of this note may be an electronic transcription/translation of spoken language to printed text using the Dragon Dictation System.

## 2023-09-25 NOTE — PLAN OF CARE
Goal Outcome Evaluation:  Plan of Care Reviewed With: patient     Problem: Adult Inpatient Plan of Care  Goal: Plan of Care Review  Outcome: Ongoing, Progressing  Flowsheets (Taken 9/25/2023 2187)  Progress: improving  Plan of Care Reviewed With: patient        Progress: improving

## 2023-09-25 NOTE — DISCHARGE PLACEMENT REQUEST
"Jermaine Black (89 y.o. Male)       Date of Birth   1926    Social Security Number       Address   [address]  [place] [address]    Home Phone   [phone]    MRN   [id number]       Nondenominational   None    Marital Status                               Admission Date   23    Admission Type   Emergency    Admitting Provider   Rolan Staley MD    Attending Provider   Miriam Jasso MD    Department, Room/Bed   [facility] 2D, 252/1       Discharge Date       Discharge Disposition       Discharge Destination                                 Attending Provider: Miriam Jasso MD    Allergies: No Known Allergies    Isolation: Enh Drop/Con   Infection: COVID (confirmed) (23)   Code Status: No CPR    Ht: 167.6 cm (66\")   Wt: 71.5 kg (157 lb 10.1 oz)    Admission Cmt: None   Principal Problem: COVID-19 [U07.1]                   Active Insurance as of 2023       Primary Coverage       Payor Plan Insurance Group Employer/Plan Group    AETNA MEDICARE REPLACEMENT AETNA MEDICARE REPLACEMENT [id number]       Payor Plan Address Payor Plan Phone Number Payor Plan Fax Number Effective Dates    [address] [phone]  2022 - None Entered    [place] [address]         Subscriber Name Subscriber Birth Date Member ID       JERMAINE BLACK 1926 [id number]                     Emergency Contacts        (Rel.) Home Phone Work Phone Mobile Phone    Wayne-Rachel Valenzuela (Daughter) -- -- [phone]    Gerri Walker (Daughter) -- -- [phone]    Skylar Rutledge (Daughter) [phone] -- --                 History & Physical        Lorena Rosas PA-C at 23 1103       Attestation signed by Rolan Staley MD at 23 [phone]    I have reviewed this documentation and agree.                      [facility] Medicine Services  History & Physical    Patient Name: Jermaine Black  : 1926  MRN: [id number]  Primary Care Physician:  Provider, No " "Known  Date of admission: 9/19/2023  Date and Time of Service: 9/19/23 at 1103    Subjective      Chief Complaint: fall    History of Present Illness: Jermaine Black is a 89 y.o. male who presented to [facility] on 9/19/2023 complaining of fall this morning onto his bilateral knees. Patient states his \"legs gave out\" and he fell onto his knees but denies hitting his head or loss of consciousness. He denies any dizziness.  He states he has been experiencing pain in his bilateral knees since this fall but states it has improved to a \"minor pain\", he states the pain is localized to the kneecaps without radiation.  He denies any numbness or tingling in his bilateral lower extremities.  Patient states he has had a cough for the past 3 weeks but denies any sputum production.  He denies any nausea, vomiting, diarrhea, constipation, abdominal pain, shortness of air, chest pain, headache, dizziness, syncope, appetite changes, fevers or chills.  Patient states he does not drink alcohol, does not use per tobacco products and does not use illicit drugs.  Patient is oriented to person and time but not place.    In the ED,  All vitals stable on admission except /84. All labs unremarkable except high-sensitivity troponin 76 which trended down to 66, proBNP 9356, CO2 32, glucose 114, hemoglobin 12.8, respiratory panel positive for COVID-19. Patient received Lasix in ED. Hospitalist was contacted to admit patient for further care and management.     XR Knee 3 View Left    Result Date: 9/19/2023  Impression: 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 Electronically Signed: Erik Baer MD  9/19/2023 6:27 AM EDT  Workstation ID: [id number]    CT Head Without Contrast    Result Date: 9/19/2023  Impression: 1.No acute intracranial abnormality. 2.Moderate chronic small vessel ischemic change. Electronically Signed: Erik Baer MD  9/19/2023 6:56 AM EDT  Workstation ID: [id number]    XR Chest 1 " View    Result Date: 9/19/2023  Impression: Mixed interstitial and airspace disease throughout the right lung and at the left lung base which may relate to pulmonary edema or multifocal atypical/viral pneumonia. Electronically Signed: Farzad Moss MD  9/19/2023 7:43 AM EDT  Workstation ID: [id number]     ECG 12 Lead Other; fatigue   Preliminary Result   HEART RATE= 72  bpm   RR Interval= 832  ms   NH Interval= 177  ms   P Horizontal Axis= -59  deg   P Front Axis=   deg   QRSD Interval= 89  ms   QT Interval= 413  ms   QTcB= 453  ms   QRS Axis= -18  deg   T Wave Axis= 41  deg   - ABNORMAL ECG -   Atrial-paced complexes   Inferior infarct, old   When compared with ECG of 30-Aug-2023 16:51:03,   Significant change in rhythm   Significant axis, voltage or hypertrophy change   Electronically Signed By:    Date and Time of Study: 2023-09-19 07:25:12            ROS 12 point ROS reviewed and negative except as mentioned above.      Personal History     No past medical history on file.    No past surgical history on file.    Family History: family history is not on file. Otherwise pertinent FHx was reviewed and not pertinent to current issue.    Social History:  reports that he has never smoked. He has never used smokeless tobacco. He reports that he does not drink alcohol and does not use drugs.    Home Medications:  Prior to Admission Medications       Prescriptions Last Dose Informant Patient Reported? Taking?    carbidopa-levodopa ER (SINEMET CR)  MG per tablet   Yes No    Take 1 tablet by mouth 3 (Three) Times a Day.    carboxymethylcellulose (REFRESH PLUS) 0.5 % solution   Yes No    Administer 1 drop to both eyes 2 (Two) Times a Day.    colesevelam (WELCHOL) 625 MG tablet   Yes No    Take 1 tablet by mouth 2 (Two) Times a Day With Meals.    dutasteride (AVODART) 0.5 MG capsule   Yes No    Take 1 capsule by mouth Daily.    erythromycin (ROMYCIN) 5 MG/GM ophthalmic ointment   Yes No    Administer 1 application  to  both eyes Every Night.    furosemide (LASIX) 40 MG tablet   Yes No    Take 1 tablet by mouth Daily.    isosorbide mononitrate (IMDUR) 60 MG 24 hr tablet   Yes No    Take 1 tablet by mouth Daily.    levothyroxine (SYNTHROID, LEVOTHROID) 75 MCG tablet   Yes No    Take 1 tablet by mouth Daily.    metoprolol succinate XL (TOPROL-XL) 50 MG 24 hr tablet   Yes No    Take 1 tablet by mouth Daily.    nitroglycerin (NITROSTAT) 0.4 MG SL tablet   Yes No    Place 1 tablet under the tongue Every 5 (Five) Minutes As Needed for Chest Pain. Take no more than 3 doses in 15 minutes.    Pimavanserin Tartrate (Nuplazid) 34 MG capsule   Yes No    Take 1 capsule by mouth Every Night.    rosuvastatin (CRESTOR) 10 MG tablet   Yes No    Take 1 tablet by mouth Daily.    tamsulosin (FLOMAX) 0.4 MG capsule 24 hr capsule   Yes No    Take 1 capsule by mouth Daily.              Allergies:  No Known Allergies    Objective      Vitals:   Temp:  [99.7 °F (37.6 °C)] 99.7 °F (37.6 °C)  Heart Rate:  [68-79] 79  Resp:  [20] 20  BP: (123-172)/(57-88) 172/88  Flow (L/min):  [1-4] 2    Physical Exam  Vitals and nursing note reviewed.   HENT:      Head: Normocephalic.   Eyes:      Extraocular Movements: Extraocular movements intact.      Pupils: Pupils are equal, round, and reactive to light.   Cardiovascular:      Rate and Rhythm: Normal rate and regular rhythm.   Pulmonary:      Effort: Pulmonary effort is normal.      Breath sounds: Normal breath sounds. Examination of the right-lower field reveals decreased breath sounds. Examination of the left-lower field reveals decreased breath sounds.      Comments: Patient on 2L of oxygen via nasal cannula  Abdominal:      General: Bowel sounds are normal.      Palpations: Abdomen is soft.      Tenderness: There is no abdominal tenderness.   Musculoskeletal:         General: Normal range of motion.      Comments: Mildly tender to palpation over left knee   Skin:     General: Skin is warm and dry.      Comments:  Small abrasion approximately 0.5 inch on right patella  Small ecchymosis over left knee   Neurological:      Mental Status: He is alert. He is disoriented.      Motor: Motor function is intact.   Psychiatric:         Mood and Affect: Mood normal.        Result Review    Result Review:  I have personally reviewed the results from the time of this admission to 9/19/2023 13:39 EDT and agree with these findings:  [x]  Laboratory  [x]  Microbiology  [x]  Radiology  [x]  EKG/Telemetry   []  Cardiology/Vascular   []  Pathology  []  Old records  []  Other:  Most notable findings include:     CBC:      Lab 09/19/23  0729   WBC 7.30   HEMOGLOBIN 12.8*   HEMATOCRIT 38.8   PLATELETS 151   NEUTROS ABS 6.10   LYMPHS ABS 0.20*   MONOS ABS 0.90   EOS ABS 0.00   MCV 98.7*     CMP:        Lab 09/19/23  0729 09/15/23  0500   SODIUM 141 141   POTASSIUM 4.3 4.3   CHLORIDE 103 106   CO2 32.0* 28.0   ANION GAP 6.0 7.0   BUN 19 18   CREATININE 1.06 0.87   EGFR 64.2 79.0   GLUCOSE 114* 89   CALCIUM 9.2 8.6         Assessment & Plan        Active Hospital Problems:  Active Hospital Problems    Diagnosis     **COVID-19     COVID-19 virus detected      Plan:     Weakness  Fall  Bilateral knee pain, left greater than right, improving  Parkinson's disease  Patient denies hitting his head  CT head radiology report states no acute intracranial abnormality, moderate chronic small vessel ischemic change  PT OT consulted  X-ray of left knee radiology report reveals total left knee prosthesis without complicating features  Tylenol ordered for pain as needed  Continue home medications for parkinson's disease  Weakness likely related to Covid-19 infection    COVID-19 infection  Respiratory panel positive for COVID-19  Patient currently on 2 L of oxygen via nasal cannula with SPO2 of 95%  C-reactive protein, D-dimer, fibrinogen, ferritin, lactate dehydrogenase labs ordered  Tessalon capsule ordered for cough  Symbicort ordered twice daily, per discussion  with attending physician  Solu-Medrol 80 mg daily for 2 days  ABG ordered  Per RN report, patient is coughing significantly after drinking water. We will make patient n.p.o. for now given coughing with water drinking and will consult SLP for further evaluation    Congestive heart failure, in acute exacerbation  proBNP 9356  Chest x-ray radiology report shows mixed interstitial and airspace disease throughout the right lung and at the left lung base which may relate to pulmonary edema or multifocal atypical/viral pneumonia  Lasix given in ED  Daily weights ordered  Strict intake and output ordered  Heart failure pathway initiated  Lasix 40 mg ordered twice daily for 2 days, hold home oral Lasix    Elevated troponin  Presence of pacemaker  High-sensitivity troponin 76 which trended down to 66  EKG shows atrial paced complexes, inferior infarct, old  Patient denies any chest pain  We will obtain additional troponin  Continue to monitor  Continuous cardiac monitoring ordered    History of CAD  Essential hypertension  /84  Continue to monitor BP  Continue home medications   As needed hydralazine ordered for SBP greater than 160     Hypothyroidism  Continue home medications     BPH  Continue home medications    TOM  Patient states he has sleep apnea but is non-compliant with CPAP use    DVT prophylaxis:  Medical DVT prophylaxis orders are present.    CODE STATUS:    Code Status (Patient has no pulse and is not breathing): CPR (Attempt to Resuscitate)  Medical Interventions (Patient has pulse or is breathing): Full Support    Admission Status:  I believe this patient meets inpatient status.    I discussed the patient's findings and my recommendations with patient and attending physician Dr. Staley .    This patient has been examined wearing appropriate Personal Protective Equipment and discussed with  attending physician Dr. Staley . 09/19/23      Signature: Electronically signed by Lorena Rosas PA-C, 09/19/23, 13:39  EDT.  [facility] Hospitalist Team    Electronically signed by Rolan Staley MD at 09/20/23 0218       Operative/Procedure Notes (all)    No notes of this type exist for this encounter.          Physician Progress Notes (last 48 hours)        Evelyn Villavicencio MD at 09/25/23 0755          Daily Progress Note          Assessment    Acute hypoxic respiratory insufficiency  COVID-19  Atypical pneumonia  COPD  CHF  BPH  HTN  Parkinson's disease   Hypothyroidism  GERD  CAD   Results for orders placed during the hospital encounter of 09/19/23    Adult Transthoracic Echo Limited W/ Cont if Necessary Per Protocol    Interpretation Summary    Left ventricular ejection fraction appears to be 46 - 50%.    Severe aortic valve stenosis is present.       PLAN:    Oxygen titration currently on 2 L   antibiotics: Rocephin changed to Ceftin , completed    Dexamethasone completed    Bronchodilators  Inhaled corticosteroids  Incentive spirometer  Electrolytes/ glycemic control  Thyroid hormone replacement  BP control  Crestor  Diuresis  DVT  prophylaxis-Lovenox            LOS: 6 days     Subjective     Afebrile, patient reports  cough but no shortness of breath    Objective     Vital signs for last 24 hours:  Vitals:    09/24/23 2140 09/25/23 0040 09/25/23 0321 09/25/23 0717   BP:  134/75 132/77 117/81   BP Location:   Right arm Right arm   Patient Position:   Lying Lying   Pulse: 72 83 71 73   Resp: 20  19 15   Temp:  98.2 °F (36.8 °C) 98.4 °F (36.9 °C) 98.6 °F (37 °C)   TempSrc:   Oral Oral   SpO2: 95% 97% 97% 93%   Weight:   71.5 kg (157 lb 10.1 oz)    Height:           Intake/Output last 3 shifts:  I/O last 3 completed shifts:  In: 720 [P.O.:720]  Out: 850 [Urine:850]  Intake/Output this shift:  No intake/output data recorded.      Radiology  Imaging Results (Last 24 Hours)       ** No results found for the last 24 hours. **            Labs:  Results from last 7 days   Lab Units 09/25/23  0214   WBC 10*3/mm3 5.80   HEMOGLOBIN g/dL 12.9*    HEMATOCRIT % 38.5   PLATELETS 10*3/mm3 136*       Results from last 7 days   Lab Units 09/25/23  0214   SODIUM mmol/L 140   POTASSIUM mmol/L 4.2   CHLORIDE mmol/L 98   CO2 mmol/L 37.0*   BUN mg/dL 24*   CREATININE mg/dL 1.04   CALCIUM mg/dL 8.7   GLUCOSE mg/dL 106*               Results from last 7 days   Lab Units 09/19/23 2011 09/19/23  0931 09/19/23  0729   HSTROP T ng/L 116* 66* 76*                             Meds:   SCHEDULE  budesonide-formoterol, 2 puff, Inhalation, BID - RT  carbidopa-levodopa ER, 1 tablet, Oral, TID  enoxaparin, 40 mg, Subcutaneous, Q24H  erythromycin, 1 application , Both Eyes, Nightly  finasteride, 5 mg, Oral, Daily  furosemide, 40 mg, Oral, Daily  isosorbide mononitrate, 60 mg, Oral, Daily  levothyroxine, 75 mcg, Oral, Daily With Breakfast  metoprolol succinate XL, 50 mg, Oral, Daily  nystatin, 1 application , Topical, Q12H  polyethylene glycol, 17 g, Oral, Daily  rosuvastatin, 10 mg, Oral, Daily  senna-docusate sodium, 1 tablet, Oral, BID  tamsulosin, 0.4 mg, Oral, Daily  vitamin D3, 5,000 Units, Oral, Daily      Infusions  Pharmacy to Dose enoxaparin (LOVENOX),       PRNs    acetaminophen    albuterol sulfate HFA    benzonatate    Calcium Replacement - Follow Nurse / BPA Driven Protocol    hydrALAZINE    Magnesium Standard Dose Replacement - Follow Nurse / BPA Driven Protocol    nitroglycerin    ondansetron    Pharmacy to Dose enoxaparin (LOVENOX)    Phosphorus Replacement - Follow Nurse / BPA Driven Protocol    Potassium Replacement - Follow Nurse / BPA Driven Protocol    Physical Exam:  General Appearance:  Alert   HEENT:  Normocephalic, without obvious abnormality, L mild erythema and dryness in conjunctiva,   nares normal, no drainage     Neck:  Supple, symmetrical, trachea midline.   Lungs /Chest wall:   Mild bilateral basal rhonchi, respirations unlabored, symmetrical wall movement.     Heart:  Regular rate and rhythm, S1 S2 normal  Abdomen: Soft, non-tender, no masses, no  organomegaly.    Extremities: No edema, no clubbing or cyanosis     ROS  Constitutional: Negative for chills, fever and positive for malaise/fatigue.   HENT: Negative.    Eyes: Negative.    Cardiovascular: Negative.    Respiratory: Positive for cough and negative for shortness of breath.    Skin: Negative.    Musculoskeletal: Negative.    Gastrointestinal: Negative.    Genitourinary: Negative.    Neurological: Generalized weakness      I reviewed the recent clinical results    Part of this note may be an electronic transcription/translation of spoken language to printed text using the Dragon Dictation System.      Electronically signed by Evelyn Villavicencio MD at 23 0801       James Allison DO at 23 1025          [facility] Medicine Services   Daily Progress Note      Patient Name: Jermaine Black  : 1926  MRN: [id number]  Primary Care Physician:  Provider, No Known  Date of admission: 2023      Subjective      Chief Complaint: Shortness of breath, fall    Patient seen and examined this morning.  Doing well this morning, denies any complaints.  Awaiting rehab placement.    Pertinent positives as noted in HPI/subjective.  All other systems were reviewed and are negative.      Objective      Vitals:   Temp:  [97.6 °F (36.4 °C)-99 °F (37.2 °C)] 97.6 °F (36.4 °C)  Heart Rate:  [72-79] 73  Resp:  [13-19] 13  BP: (107-162)/(67-97) 141/97    Physical Exam:    General: Awake, alert, elderly male, lying in bed, NAD  Cardiovascular: Regular rate and rhythm, no murmurs  Respiratory: Clear to auscultation bilaterally, no wheezing or rales, unlabored breathing  Abdomen: Soft, nontender, positive bowel sounds, no guarding  Musculoskeletal: Generalized weakness, no other gross deformities  Skin: Warm, dry, intact         Result Review    Result Review:  I have personally reviewed the results from the time of this admission to 2023 10:25 EDT and agree with these findings:  [x]  Laboratory  []   Microbiology  []  Radiology  []  EKG/Telemetry   []  Cardiology/Vascular   []  Pathology  []  Old records  []  Other:    Wounds (last 24 hours)               Assessment & Plan      Brief Patient Summary:  Jermaine Black is a 89 y.o. male with a history of COPD, CHF with unknown EF, Parkinson's Disease, Hypothyroidism, CAD, Hyperlipidemia, GERD, BPH, and HTN who came to the ER after a fall on the morning of admission. Patient states that his legs gave out and he fell onto his knees but did not hit his head. He further states he has had a cough for the past 3 weeks but no sputum production. In the ER patient noted with COVID-19 with atypical pneumonia as well as possible CHF exacerbation with elevated BNP.  Hypoxic requiring 2 to 3 L nasal cannula, started on dexamethasone and Lasix and pulmonology also consulted.      budesonide-formoterol, 2 puff, Inhalation, BID - RT  carbidopa-levodopa ER, 1 tablet, Oral, TID  enoxaparin, 40 mg, Subcutaneous, Q24H  erythromycin, 1 application , Both Eyes, Nightly  finasteride, 5 mg, Oral, Daily  furosemide, 40 mg, Oral, Daily  isosorbide mononitrate, 60 mg, Oral, Daily  levothyroxine, 75 mcg, Oral, Daily With Breakfast  metoprolol succinate XL, 50 mg, Oral, Daily  nystatin, 1 application , Topical, Q12H  polyethylene glycol, 17 g, Oral, Daily  rosuvastatin, 10 mg, Oral, Daily  senna-docusate sodium, 1 tablet, Oral, BID  tamsulosin, 0.4 mg, Oral, Daily  vitamin D3, 5,000 Units, Oral, Daily       Pharmacy to Dose enoxaparin (LOVENOX),          I have utilized all available, immediate resources to obtain, update, or review the patient's current medications including all prescriptions, over-the-counter products, herbals, cannabis/cannabidiol products, and vitamin.mineral/dietary (nutritional) supplements.    Active Hospital Problems:  Active Hospital Problems    Diagnosis     **COVID-19     Moderate malnutrition     COVID-19 virus detected      Plan:     Acute hypoxemic respiratory  failure, improved  COVID-19 infection with atypical pneumonia  -Chest x-ray findings noted  -Continue bronchodilators and dexamethasone  -Improved and weaned down to room air now  -Encourage spirometry  -On oral antibiotics per pulmonology  -Okay to discharge per pulmonology  -Awaiting placement     Acute on chronic HFrEF  Severe aortic stenosis  -Echo this admission shows EF of 40 to 45% and severe aortic stenosis noted  -Medical management now for diuresis with Lasix as tolerated, monitor I's and O's  -Due to advanced age, patient likely not a candidate for any aggressive therapy for aortic stenosis  -Continue GDMT as tolerated  -Cardiology signed off    PAF  SSS s/p pacemaker  -Diagnosed with A-fib during this admission, rate well controlled  -Continue beta-blocker and monitor on telemetry  -Patient very high fall risk, not a candidate for full anticoagulation with also recent history of questionable SAH noted during previous admission  -Cardiology s/o    Elevated D-dimer  -Likely secondary to COVID, less likely VTE  -CTA chest negative for PE  -Monitor    Parkinson's disease  Deconditioning  -Continue home meds  -PT/OT    Hypertension  Hyperlipidemia  -Blood pressure controlled  -Continue home meds as tolerated, monitor    Hypothyroidism  GERD  BPH  -Continue home meds as tolerated, monitor    DVT prophylaxis  -Lovenox    CODE STATUS:    Medical Intervention Limits: NO intubation (DNI)  Level Of Support Discussed With: Health Care Surrogate  Code Status (Patient has no pulse and is not breathing): No CPR (Do Not Attempt to Resuscitate)  Medical Interventions (Patient has pulse or is breathing): Limited Support      Disposition: SNF placement.  Medically stable for discharge.    Electronically signed by James Allison DO, 09/24/23, 10:25 EDT.  [facility] Hospitalist Team      Part of this note may be an electronic transcription/translation of spoken language to printed text using the Dragon Dictation  System.      Electronically signed by James Allison DO at 09/24/23 1025       Kylie Morotn MD at 09/24/23 0936          Daily Progress Note          Assessment    Hypoxemia  COVID-19  Atypical pneumonia  COPD  CHF  BPH  HTN  Parkinson's disease   Hypothyroidism  GERD  CAD   Results for orders placed during the hospital encounter of 09/19/23    Adult Transthoracic Echo Limited W/ Cont if Necessary Per Protocol    Interpretation Summary    Left ventricular ejection fraction appears to be 46 - 50%.    Severe aortic valve stenosis is present.       PLAN:  Respiratory status is stable and currently on RA, ok to transfer to SNF/rehab from pulmonary stand point.  Antibiotics: Rocephin changed to Ceftin and now completed    Dexamethasone completed  Bronchodilators  Inhaled corticosteroids  Incentive spirometer  Electrolytes/ glycemic control  Thyroid hormone replacement  BP control  Crestor  Diuresis  DVT  prophylaxis-Lovenox            LOS: 5 days     Subjective     Afebrile, patient reports  cough but no shortness of breath    Objective     Vital signs for last 24 hours:  Vitals:    09/23/23 2042 09/24/23 0041 09/24/23 0445 09/24/23 0737   BP: 107/77 162/85 160/90 141/97   BP Location: Right arm      Patient Position: Lying      Pulse: 72 75 74 73   Resp: 13      Temp: 99 °F (37.2 °C) 98 °F (36.7 °C) 97.6 °F (36.4 °C)    TempSrc: Oral      SpO2:  95% 98%    Weight:   71.3 kg (157 lb 3 oz)    Height:           Intake/Output last 3 shifts:  I/O last 3 completed shifts:  In: 980 [P.O.:980]  Out: 400 [Urine:400]  Intake/Output this shift:  No intake/output data recorded.      Radiology  Imaging Results (Last 24 Hours)       ** No results found for the last 24 hours. **            Labs:  Results from last 7 days   Lab Units 09/24/23  0021   WBC 10*3/mm3 4.60   HEMOGLOBIN g/dL 11.9*   HEMATOCRIT % 34.8*   PLATELETS 10*3/mm3 132*       Results from last 7 days   Lab Units 09/24/23  0021   SODIUM mmol/L 140   POTASSIUM  mmol/L 4.0   CHLORIDE mmol/L 98   CO2 mmol/L 34.0*   BUN mg/dL 28*   CREATININE mg/dL 1.00   CALCIUM mg/dL 8.4   GLUCOSE mg/dL 132*               Results from last 7 days   Lab Units 09/19/23 2011 09/19/23  0931 09/19/23  0729   HSTROP T ng/L 116* 66* 76*                             Meds:   SCHEDULE  budesonide-formoterol, 2 puff, Inhalation, BID - RT  carbidopa-levodopa ER, 1 tablet, Oral, TID  enoxaparin, 40 mg, Subcutaneous, Q24H  erythromycin, 1 application , Both Eyes, Nightly  finasteride, 5 mg, Oral, Daily  furosemide, 40 mg, Oral, Daily  isosorbide mononitrate, 60 mg, Oral, Daily  levothyroxine, 75 mcg, Oral, Daily With Breakfast  metoprolol succinate XL, 50 mg, Oral, Daily  nystatin, 1 application , Topical, Q12H  polyethylene glycol, 17 g, Oral, Daily  rosuvastatin, 10 mg, Oral, Daily  senna-docusate sodium, 1 tablet, Oral, BID  tamsulosin, 0.4 mg, Oral, Daily  vitamin D3, 5,000 Units, Oral, Daily      Infusions  Pharmacy to Dose enoxaparin (LOVENOX),       PRNs    acetaminophen    albuterol sulfate HFA    benzonatate    Calcium Replacement - Follow Nurse / BPA Driven Protocol    hydrALAZINE    Magnesium Standard Dose Replacement - Follow Nurse / BPA Driven Protocol    nitroglycerin    ondansetron    Pharmacy to Dose enoxaparin (LOVENOX)    Phosphorus Replacement - Follow Nurse / BPA Driven Protocol    Potassium Replacement - Follow Nurse / BPA Driven Protocol    Physical Exam:  General Appearance:  Alert   HEENT:  Normocephalic, without obvious abnormality, L mild erythema and dryness in conjunctiva,   nares normal, no drainage     Neck:  Supple, symmetrical, trachea midline.   Lungs /Chest wall:   Mild bilateral basal rhonchi, respirations unlabored, symmetrical wall movement.     Heart:  Regular rate and rhythm, S1 S2 normal  Abdomen: Soft, non-tender, no masses, no organomegaly.    Extremities: No edema, no clubbing or cyanosis     ROS  Constitutional: Negative for chills, fever and positive for  malaise/fatigue.   HENT: Negative.    Eyes: Negative.    Cardiovascular: Negative.    Respiratory: Positive for cough and negative for shortness of breath.    Skin: Negative.    Musculoskeletal: Negative.    Gastrointestinal: Negative.    Genitourinary: Negative.    Neurological: Generalized weakness      I reviewed the recent clinical results    Part of this note may be an electronic transcription/translation of spoken language to printed text using the Dragon Dictation System.      Electronically signed by Kylie Morton MD at 09/24/23 0938       Kylie Morton MD at 09/23/23 1048          Daily Progress Note          Assessment    Hypoxemia  COVID-19  Atypical pneumonia  COPD  CHF  BPH  HTN  Parkinson's disease   Hypothyroidism  GERD  CAD   Results for orders placed during the hospital encounter of 09/19/23    Adult Transthoracic Echo Limited W/ Cont if Necessary Per Protocol    Interpretation Summary    Left ventricular ejection fraction appears to be 46 - 50%.    Severe aortic valve stenosis is present.       PLAN:  Respiratory status is improving and currently on RA, ok to transfer to SNF/rehab from pulmonary stand point.  Antibiotics: Rocephin changed to Ceftin  Remdesivir is not needed at this point since the COVID pneumonia is mild  Dexamethasone completed  Bronchodilators  Inhaled corticosteroids  Incentive spirometer  Electrolytes/ glycemic control  Thyroid hormone replacement  BP control  Crestor  DVT  prophylaxis-Lovenox            LOS: 4 days     Subjective     Afebrile, patient reports no cough and less shortness of breath    Objective     Vital signs for last 24 hours:  Vitals:    09/22/23 2125 09/22/23 2300 09/23/23 0318 09/23/23 0935   BP:  138/83 146/79 144/90   BP Location:   Left arm Right arm   Patient Position:   Lying Lying   Pulse: 84 88 86 84   Resp:   15 19   Temp:  98.4 °F (36.9 °C) 98.3 °F (36.8 °C) 98.1 °F (36.7 °C)   TempSrc:   Oral Oral   SpO2: 91% 94% 96% 94%   Weight:   72.6 kg  (160 lb 0.9 oz)    Height:           Intake/Output last 3 shifts:  I/O last 3 completed shifts:  In: 1220 [P.O.:1220]  Out: 200 [Urine:200]  Intake/Output this shift:  I/O this shift:  In: 140 [P.O.:140]  Out: -       Radiology  Imaging Results (Last 24 Hours)       ** No results found for the last 24 hours. **            Labs:  Results from last 7 days   Lab Units 09/23/23  0145   WBC 10*3/mm3 5.80   HEMOGLOBIN g/dL 11.7*   HEMATOCRIT % 34.8*   PLATELETS 10*3/mm3 132*       Results from last 7 days   Lab Units 09/23/23  0145   SODIUM mmol/L 141   POTASSIUM mmol/L 4.0   CHLORIDE mmol/L 99   CO2 mmol/L 35.0*   BUN mg/dL 29*   CREATININE mg/dL 1.05   CALCIUM mg/dL 8.0*   GLUCOSE mg/dL 139*               Results from last 7 days   Lab Units 09/19/23 2011 09/19/23  0931 09/19/23  0729   HSTROP T ng/L 116* 66* 76*                             Meds:   SCHEDULE  albuterol sulfate HFA, 2 puff, Inhalation, 4x Daily - RT  budesonide, 0.5 mg, Nebulization, BID - RT  carbidopa-levodopa ER, 1 tablet, Oral, TID  cefuroxime, 500 mg, Oral, Q12H  enoxaparin, 40 mg, Subcutaneous, Q24H  erythromycin, 1 application , Both Eyes, Nightly  finasteride, 5 mg, Oral, Daily  furosemide, 40 mg, Oral, Daily  isosorbide mononitrate, 60 mg, Oral, Daily  levothyroxine, 75 mcg, Oral, Daily With Breakfast  metoprolol succinate XL, 50 mg, Oral, Daily  nystatin, 1 application , Topical, Q12H  polyethylene glycol, 17 g, Oral, Daily  rosuvastatin, 10 mg, Oral, Daily  senna-docusate sodium, 1 tablet, Oral, BID  tamsulosin, 0.4 mg, Oral, Daily  vitamin D3, 5,000 Units, Oral, Daily      Infusions  Pharmacy to Dose enoxaparin (LOVENOX),       PRNs    acetaminophen    benzonatate    Calcium Replacement - Follow Nurse / BPA Driven Protocol    hydrALAZINE    Magnesium Standard Dose Replacement - Follow Nurse / BPA Driven Protocol    nitroglycerin    ondansetron    Pharmacy to Dose enoxaparin (LOVENOX)    Phosphorus Replacement - Follow Nurse / BPA Driven  Protocol    Potassium Replacement - Follow Nurse / BPA Driven Protocol    Physical Exam:  General Appearance:  Alert   HEENT:  Normocephalic, without obvious abnormality, L mild erythema and dryness in conjunctiva,   nares normal, no drainage     Neck:  Supple, symmetrical, trachea midline.   Lungs /Chest wall:   Mild bilateral basal rhonchi, respirations unlabored, symmetrical wall movement.     Heart:  Regular rate and rhythm, S1 S2 normal  Abdomen: Soft, non-tender, no masses, no organomegaly.    Extremities: No edema, no clubbing or cyanosis     ROS  Constitutional: Negative for chills, fever and positive for malaise/fatigue.   HENT: Negative.    Eyes: Negative.    Cardiovascular: Negative.    Respiratory: Positive for improving cough and shortness of breath.    Skin: Negative.    Musculoskeletal: Negative.    Gastrointestinal: Negative.    Genitourinary: Negative.    Neurological: Generalized weakness      I reviewed the recent clinical results    Part of this note may be an electronic transcription/translation of spoken language to printed text using the Dragon Dictation System.      Electronically signed by Kylie Morton MD at 23 1049       James Allison DO at 23 1028          [facility] Medicine Services   Daily Progress Note      Patient Name: Jermaine Black  : 1926  MRN: [id number]  Primary Care Physician:  Provider, No Known  Date of admission: 2023      Subjective      Chief Complaint: Shortness of breath, fall    Patient seen and examined this morning.  Doing okay this morning, respiratory status stable.  Awaiting rehab placement      Pertinent positives as noted in HPI/subjective.  All other systems were reviewed and are negative.      Objective      Vitals:   Temp:  [97.8 °F (36.6 °C)-98.4 °F (36.9 °C)] 98.3 °F (36.8 °C)  Heart Rate:  [70-88] 84  Resp:  [12-19] 19  BP: ()/(58-90) 144/90  Flow (L/min):  [2] 2    Physical Exam:    General: Awake, alert,  elderly male, lying in bed, NAD  Cardiovascular: Regular rate and rhythm, no murmurs  Respiratory: Clear to auscultation bilaterally, no wheezing or rales, unlabored breathing  Abdomen: Soft, nontender, positive bowel sounds, no guarding  Musculoskeletal: Generalized weakness, no other gross deformities  Skin: Warm, dry, intact         Result Review    Result Review:  I have personally reviewed the results from the time of this admission to 9/23/2023 10:28 EDT and agree with these findings:  [x]  Laboratory  []  Microbiology  []  Radiology  []  EKG/Telemetry   []  Cardiology/Vascular   []  Pathology  []  Old records  []  Other:    Wounds (last 24 hours)               Assessment & Plan      Brief Patient Summary:  Jermaine Black is a 89 y.o. male with a history of COPD, CHF with unknown EF, Parkinson's Disease, Hypothyroidism, CAD, Hyperlipidemia, GERD, BPH, and HTN who came to the ER after a fall on the morning of admission. Patient states that his legs gave out and he fell onto his knees but did not hit his head. He further states he has had a cough for the past 3 weeks but no sputum production. In the ER patient noted with COVID-19 with atypical pneumonia as well as possible CHF exacerbation with elevated BNP.  Hypoxic requiring 2 to 3 L nasal cannula, started on dexamethasone and Lasix and pulmonology also consulted.      albuterol sulfate HFA, 2 puff, Inhalation, 4x Daily - RT  budesonide, 0.5 mg, Nebulization, BID - RT  carbidopa-levodopa ER, 1 tablet, Oral, TID  cefuroxime, 500 mg, Oral, Q12H  enoxaparin, 40 mg, Subcutaneous, Q24H  erythromycin, 1 application , Both Eyes, Nightly  finasteride, 5 mg, Oral, Daily  furosemide, 40 mg, Oral, Daily  isosorbide mononitrate, 60 mg, Oral, Daily  levothyroxine, 75 mcg, Oral, Daily With Breakfast  metoprolol succinate XL, 50 mg, Oral, Daily  nystatin, 1 application , Topical, Q12H  polyethylene glycol, 17 g, Oral, Daily  rosuvastatin, 10 mg, Oral, Daily  senna-docusate  sodium, 1 tablet, Oral, BID  tamsulosin, 0.4 mg, Oral, Daily  vitamin D3, 5,000 Units, Oral, Daily       Pharmacy to Dose enoxaparin (LOVENOX),          I have utilized all available, immediate resources to obtain, update, or review the patient's current medications including all prescriptions, over-the-counter products, herbals, cannabis/cannabidiol products, and vitamin.mineral/dietary (nutritional) supplements.    Active Hospital Problems:  Active Hospital Problems    Diagnosis     **COVID-19     Moderate malnutrition     COVID-19 virus detected      Plan:     Acute hypoxemic respiratory failure, improved  COVID-19 infection with atypical pneumonia  -Chest x-ray findings noted  -Continue bronchodilators and dexamethasone  -Improved and weaned down to room air now  -Encourage spirometry  -On oral antibiotics per pulmonology  -Okay to discharge per pulmonology  -Awaiting placement     Acute on chronic HFrEF  Severe aortic stenosis  -Echo this admission shows EF of 40 to 45% and severe aortic stenosis noted  -Medical management now for diuresis with Lasix as tolerated, monitor I's and O's  -Due to advanced age, patient likely not a candidate for any aggressive therapy for aortic stenosis  -Continue GDMT as tolerated  -Cardiology signed off    PAF  SSS s/p pacemaker  -Diagnosed with A-fib during this admission, rate well controlled  -Continue beta-blocker and monitor on telemetry  -Patient very high fall risk, not a candidate for full anticoagulation with also recent history of questionable SAH noted during previous admission  -Cardiology s/o    Elevated D-dimer  -Likely secondary to COVID, less likely VTE  -CTA chest negative for PE  -Monitor    Parkinson's disease  Deconditioning  -Continue home meds  -PT/OT    Hypertension  Hyperlipidemia  -Blood pressure controlled  -Continue home meds as tolerated, monitor    Hypothyroidism  GERD  BPH  -Continue home meds as tolerated, monitor    DVT  prophylaxis  -Lovenox    CODE STATUS:    Medical Intervention Limits: NO intubation (DNI)  Level Of Support Discussed With: Health Care Surrogate  Code Status (Patient has no pulse and is not breathing): No CPR (Do Not Attempt to Resuscitate)  Medical Interventions (Patient has pulse or is breathing): Limited Support      Disposition: SNF placement.  Medically stable for discharge.    Electronically signed by James Allison DO, 09/23/23, 10:28 EDT.  [facility] Hospitalist Team      Part of this note may be an electronic transcription/translation of spoken language to printed text using the Dragon Dictation System.      Electronically signed by James Allison DO at 09/23/23 1029       Consult Notes (last 48 hours)  Notes from 09/23/23 0928 through 09/25/23 0928   No notes of this type exist for this encounter.          Physical Therapy Notes (last 24 hours)        Iman Saleem PTA at 09/24/23 1600  Version 1 of 1         Subjective: Pt agreeable to therapeutic plan of care. Pt stated he has very poor vision, can see things but not make them out. Stated he uses rwx at home.    Objective:     Bed mobility - SBA  Transfers - CGA and with rolling walker  Ambulation - 35 feet CGA and with rolling walker and vc's for direction due to poor vision    Vitals: WNL    Pain: 0 VAS     Education: Provided education on the importance of mobility in the acute care setting, Verbal/Tactile Cues, Transfer Training, and Gait Training    Assessment: Jermaine Black presents with functional mobility impairments which indicate the need for skilled intervention. Tolerating session today without incident. Encouraged pt to try and scan ahead of him to see where to walk. Presented with slowed jeannine, shuffle gait but no LOB noted. Plans on rehab at [place].Will continue to follow and progress as tolerated.     Plan/Recommendations:   Moderate Intensity Therapy recommended post-acute care. This is recommended as therapy feels the  "patient would require 3-4 days per week and wouldn't tolerate \"3 hour daily\" rehab intensity. SNF would be the preferred choice. If the patient does not agree to SNF, arrange HH or OP depending on home bound status. If patient is medically complex, consider LTACH.. Pt requires no DME at discharge.     Pt desires Skilled Rehab placement at discharge. Pt cooperative; agreeable to therapeutic recommendations and plan of care.         Basic Mobility 6-click:  Rollin = Total, A lot = 2, A little = 3; 4 = None  Supine>Sit:   1 = Total, A lot = 2, A little = 3; 4 = None   Sit>Stand with arms:  1 = Total, A lot = 2, A little = 3; 4 = None  Bed>Chair:   1 = Total, A lot = 2, A little = 3; 4 = None  Ambulate in room:  1 = Total, A lot = 2, A little = 3; 4 = None  3-5 Steps with railin = Total, A lot = 2, A little = 3; 4 = None  Score: 18    Post-Tx Position: Supine with HOB Elevated, Alarms activated, and Call light and personal items within reach  PPE: gloves, gown, eye protection, and N95     Electronically signed by Iman Saleem PTA at 23       Iman Saleem PTA at 23 1600  Version 1 of 1         Assessment: Jermaine Black presents with functional mobility impairments which indicate the need for skilled intervention. Tolerating session today without incident. Encouraged pt to try and scan ahead of him to see where to walk. Presented with slowed jeannine, shuffle gait but no LOB noted. Plans on rehab at [place].Will continue to follow and progress as tolerated.        Electronically signed by Imna Saleem PTA at 23 171          Occupational Therapy Notes (all)        Mikayla Mcneill, OT at 23 0708          Patient Name: Jermaine Black  : 1926    MRN: [id number]                              Today's Date: 2023       Admit Date: 2023    Visit Dx:     ICD-10-CM ICD-9-CM   1. Generalized weakness  R53.1 780.79   2. Fall, initial encounter  W19.XXXA " E888.9   3. COVID  U07.1 079.89   4. Elevated troponin  R77.8 790.6   5. Acute on chronic congestive heart failure, unspecified heart failure type  I50.9 428.0     Patient Active Problem List   Diagnosis    Syncope    Parkinson's disease    HTN (hypertension)    HLD (hyperlipidemia)    CAD (coronary artery disease)    CHF (congestive heart failure)    Dehydration    COVID-19 virus detected    COVID-19     History reviewed. No pertinent past medical history.  History reviewed. No pertinent surgical history.   General Information       Row Name 09/20/23 0909          General Information    Existing Precautions/Restrictions oxygen therapy device and L/min;fall;other (see comments)  visual deficit, bilateral [place]  -     Barriers to Rehab medically complex;visual deficit;hearing deficit  -       Row Name 09/20/23 0909          Living Environment    People in Home spouse  they have HH aide tue/thurs for several hours. Pt states with certainty this visit that the HH aide only assists with chores. Not with ADL. Pt uses RW, has W/C, and is baseline mod (I) for ADL.  -       Row Name 09/20/23 0909          Cognition    Orientation Status (Cognition) oriented x 4;verbal cues/prompts needed for orientation  -       Row Name 09/20/23 0909          Safety Issues, Functional Mobility    Safety Issues Affecting Function (Mobility) insight into deficits/self-awareness;judgment;problem-solving;awareness of need for assistance;safety precaution awareness;sequencing abilities  -     Impairments Affecting Function (Mobility) balance;cognition;grasp;endurance/activity tolerance;shortness of breath;strength;visual/perceptual  -               User Key  (r) = Recorded By, (t) = Taken By, (c) = Cosigned By      Initials Name Provider Type     Mikayla Mcneill OT Occupational Therapist                     Mobility/ADL's       Row Name 09/20/23 0912          Bed Mobility    Comment, (Bed Mobility) pt declines bed mobility. RN  reports pt appears anxious regarding SOA. Pt stated he had no pain or discomfort at rest in bed and perhaps just wanted to keep it that way. Agreed to bed level ROM & MMT.  -       Row Name 09/20/23 0912          Activities of Daily Living    BADL Assessment/Intervention grooming  -       Row Name 09/20/23 0912          Self-Feeding Assessment/Training    [place] Level (Feeding) feeding skills;maximum assist (25% patient effort)  -     Comment, (Feeding) aide fed pt this am  -       Row Name 09/20/23 0912          Grooming Assessment/Training    [place] Level (Grooming) wash face, hands;dependent (less than 25% patient effort)  -     Comment, (Grooming) OT assisted pt to wash face and clean some discharge from eyes, clean glasses, and don glasses, though pt reported the trifocals did not increase his vision. Pt reports he manages at home with low vision by knowing where everything is and keeping things in place.  -               User Key  (r) = Recorded By, (t) = Taken By, (c) = Cosigned By      Initials Name Provider Type     Mikayla Mcneill, OT Occupational Therapist                   Obj/Interventions       Row Name 09/20/23 0915          Vision Assessment/Intervention    Visual Impairment/Limitations visual/perceptual impairments present  -     Vision Assessment Comment lt eye sees shadows and light, rt eye has some vision. Pt can tell how many fingers held up with some uncertainty 2' in front of his face.  -       Row Name 09/20/23 0915          Range of Motion Comprehensive    Comment, General Range of Motion Parkinson's Dz so likely some trunk stiffness and ataxia with walking and mvmt. PROM WNL in bed. AROM Shld flex 50% abena. but needs to raise one arm at a time to achieve 50% flexion.  -       Row Name 09/20/23 0915          Strength Comprehensive (MMT)    Comment, General Manual Muscle Testing (MMT) Assessment shld 2/5;  2+/5  -               User Key  (r) = Recorded By,  (t) = Taken By, (c) = Cosigned By      Initials Name Provider Type     Mikayla Mcneill, OT Occupational Therapist                   Goals/Plan       Row Name 09/20/23 0923          Bed Mobility Goal 1 (OT)    Activity/Assistive Device (Bed Mobility Goal 1, OT) bed mobility activities, all  -     [place] Level/Cues Needed (Bed Mobility Goal 1, OT) minimum assist (75% or more patient effort)  -     Time Frame (Bed Mobility Goal 1, OT) 2 weeks  -       Row Name 09/20/23 0923          Transfer Goal 1 (OT)    Activity/Assistive Device (Transfer Goal 1, OT) transfers, all;walker, rolling  -     [place] Level/Cues Needed (Transfer Goal 1, OT) minimum assist (75% or more patient effort);verbal cues required;nonverbal cues (demo/gesture) required  -     Time Frame (Transfer Goal 1, OT) 2 weeks  -       Row Name 09/20/23 0923          Grooming Goal 1 (OT)    Activity/Device (Grooming Goal 1, OT) grooming skills, all  -     [place] (Grooming Goal 1, OT) verbal cues required;nonverbal cues (demo/gesture) required;contact guard required  -     Time Frame (Grooming Goal 1, OT) 2 weeks  -       Row Name 09/20/23 0923          Self-Feeding Goal 1 (OT)    Activity/Device (Self-Feeding Goal 1, OT) self-feeding skills, all  -     [place] Level/Cues Needed (Self-Feeding Goal 1, OT) set-up required;verbal cues required  -     Time Frame (Self-Feeding Goal 1, OT) 2 weeks  -       Row Name 09/20/23 0923          Therapy Assessment/Plan (OT)    Planned Therapy Interventions (OT) activity tolerance training;adaptive equipment training;BADL retraining;cognitive/visual perception retraining;neuromuscular control/coordination retraining;occupation/activity based interventions;orthotic fabrication/fitting/training;patient/caregiver education/training;transfer/mobility retraining  -               User Key  (r) = Recorded By, (t) = Taken By, (c) = Cosigned By      Initials Name Provider Type      Mikayla Mcneill, OT Occupational Therapist                   Clinical Impression       Row Name 09/20/23 0917          Pain Assessment    Pretreatment Pain Rating 0/10 - no pain  -     Posttreatment Pain Rating 0/10 - no pain  -       Row Name 09/20/23 0917          Plan of Care Review    Plan of Care Reviewed With patient  -     Progress no change  -     Outcome Evaluation 89 y.o. male with a history of COPD, CHF, Parkinson's Disease, Hypothyroidism, CAD, Hyperlipidemia, GERD, BPH, and HTN who came to the ER after a fall at home this morning. Patient states that his legs gave out and he fell onto his knees but did not hit his head. He further states he has had a cough for the past 3 weeks but no sputum production. In the ER patient noted with COVID-19 and Atypical pneumonia and admitted for further treatment. Pt was admitted with syncope about a month ago and was found to have had SDH. he went to acute rehab for a short stay and had been back home for 3-4 days. He reports he had not been able to bathe himself at home but was managing other ADL and walking w/ RW. He does own a w/c. He appears very weak this morning and prefers to stay in bed due to SOA. He needed some assist this am to self feed. His chronic low vision complicates his mobility and functioning in an unfamiliar environment. OT recommends SNF for rehab at d/c.  -       Row Name 09/20/23 0917          Therapy Assessment/Plan (OT)    Rehab Potential (OT) good, to achieve stated therapy goals  -     Criteria for Skilled Therapeutic Interventions Met (OT) skilled treatment is necessary  -     Therapy Frequency (OT) 3 times/wk  -     Predicted Duration of Therapy Intervention (OT) until d/c  -       Row Name 09/20/23 0917          Therapy Plan Review/Discharge Plan (OT)    Anticipated Discharge Disposition (OT) skilled nursing facility  -       Row Name 09/20/23 0917          Vital Signs    O2 Delivery Pre Treatment supplemental O2  -      Pre Patient Position Supine  -       Row Name 09/20/23 0917          Positioning and Restraints    Pre-Treatment Position in bed  -     Post Treatment Position bed  -     In Bed fowlers;call light within reach;encouraged to call for assist;exit alarm on  -               User Key  (r) = Recorded By, (t) = Taken By, (c) = Cosigned By      Initials Name Provider Type    Mikayla Sahu OT Occupational Therapist                   Outcome Measures       Row Name 09/20/23 0924          Modified Ruddy Scale    Pre-Stroke Modified Ruddy Scale 2 - Slight disability.  Unable to carry out all previous activities but able to look after own affairs without assistance.  -     Modified [place] Scale 4 - Moderately severe disability.  Unable to walk without assistance, and unable to attend to own bodily needs without assistance.  -       Row Name 09/20/23 0924          Functional Assessment    Outcome Measure Options Modified Ruddy  -               User Key  (r) = Recorded By, (t) = Taken By, (c) = Cosigned By      Initials Name Provider Type    Mikayla Sahu OT Occupational Therapist                    Occupational Therapy Education       Title: PT OT SLP Therapies (Done)       Topic: Occupational Therapy (Done)       Point: ADL training (Done)       Description:   Instruct learner(s) on proper safety adaptation and remediation techniques during self care or transfers.   Instruct in proper use of assistive devices.                  Learning Progress Summary             Patient Acceptance, E, VU by VICKY at 9/20/2023 0903    Acceptance, E, VU by  at 9/19/2023 1610                         Point: Home exercise program (Done)       Description:   Instruct learner(s) on appropriate technique for monitoring, assisting and/or progressing therapeutic exercises/activities.                  Learning Progress Summary             Patient Acceptance, E, VU by VICKY at 9/20/2023 0903    Acceptance, E, VU by MG at  9/19/2023 1610                         Point: Precautions (Done)       Description:   Instruct learner(s) on prescribed precautions during self-care and functional transfers.                  Learning Progress Summary             Patient Acceptance, E, VU,NR by  at 9/20/2023 0925    Acceptance, E, VU by  at 9/20/2023 0903    Acceptance, E, VU by MG at 9/19/2023 1610                         Point: Body mechanics (Done)       Description:   Instruct learner(s) on proper positioning and spine alignment during self-care, functional mobility activities and/or exercises.                  Learning Progress Summary             Patient Acceptance, E, VU,NR by  at 9/20/2023 0925    Acceptance, E, VU by AW at 9/20/2023 0903    Acceptance, E, VU by MG at 9/19/2023 1610                                         User Key       Initials Effective Dates Name Provider Type Discipline     06/16/21 -  Mikayla Mcneill OT Occupational Therapist OT     06/16/21 -  Delphine Shankar RN Registered Nurse Nurse     08/15/22 -  Kim Figueroa LPN Licensed Nurse Nurse                  OT Recommendation and Plan  Planned Therapy Interventions (OT): activity tolerance training, adaptive equipment training, BADL retraining, cognitive/visual perception retraining, neuromuscular control/coordination retraining, occupation/activity based interventions, orthotic fabrication/fitting/training, patient/caregiver education/training, transfer/mobility retraining  Therapy Frequency (OT): 3 times/wk  Plan of Care Review  Plan of Care Reviewed With: patient  Progress: no change  Outcome Evaluation: 89 y.o. male with a history of COPD, CHF, Parkinson's Disease, Hypothyroidism, CAD, Hyperlipidemia, GERD, BPH, and HTN who came to the ER after a fall at home this morning. Patient states that his legs gave out and he fell onto his knees but did not hit his head. He further states he has had a cough for the past 3 weeks but no sputum production. In  the ER patient noted with COVID-19 and Atypical pneumonia and admitted for further treatment. Pt was admitted with syncope about a month ago and was found to have had SDH. he went to acute rehab for a short stay and had been back home for 3-4 days. He reports he had not been able to bathe himself at home but was managing other ADL and walking w/ RW. He does own a w/c. He appears very weak this morning and prefers to stay in bed due to SOA. He needed some assist this am to self feed. His chronic low vision complicates his mobility and functioning in an unfamiliar environment. OT recommends SNF for rehab at d/c.     Time Calculation:         Time Calculation- OT       Row Name 09/20/23 0925             Time Calculation-     OT Start Time 0850  -      OT Stop Time 0904  -      OT Time Calculation (min) 14 min  -      Total Timed Code Minutes- OT 0 minute(s)  -      OT Received On 09/20/23  -      OT - Next Appointment 09/22/23  -      OT Goal Re-Cert Due Date 10/04/23  -                User Key  (r) = Recorded By, (t) = Taken By, (c) = Cosigned By      Initials Name Provider Type     Mikayla Mcneill OT Occupational Therapist                  Therapy Charges for Today       Code Description Service Date Service Provider Modifiers Qty    [id number]  OT EVAL MOD COMPLEXITY 3 9/20/2023 Mikayla Mcneill OT GO 1                 Mikayla Mcneill OT  9/20/2023    Electronically signed by Mikayla Mcneill OT at 09/20/23 0926       Mikayla Mcneill OT at 09/20/23 0925          Goal Outcome Evaluation:  Plan of Care Reviewed With: patient        Progress: no change  Outcome Evaluation: 89 y.o. male with a history of COPD, CHF, Parkinson's Disease, Hypothyroidism, CAD, Hyperlipidemia, GERD, BPH, and HTN who came to the ER after a fall at home this morning. Patient states that his legs gave out and he fell onto his knees but did not hit his head. He further states he has had a cough for the past 3 weeks but no  sputum production. In the ER patient noted with COVID-19 and Atypical pneumonia and admitted for further treatment. Pt was admitted with syncope about a month ago and was found to have had SDH. he went to acute rehab for a short stay and had been back home for 3-4 days. He reports he had not been able to bathe himself at home but was managing other ADL and walking w/ RW. He does own a w/c. He appears very weak this morning and prefers to stay in bed due to SOA. He needed some assist this am to self feed. His chronic low vision complicates his mobility and functioning in an unfamiliar environment. OT recommends SNF for rehab at d/c.      Anticipated Discharge Disposition (OT): skilled nursing facility    Electronically signed by Mikayla Mcneill OT at 23 0931          Speech Language Pathology Notes (most recent note)        Fakto, Prangchat, SLP at 23 1514          Acute Care - Speech Language Pathology   Swallow Treatment Note  Rai     Patient Name: Jermaine Black  : 1926  MRN: [id number]  Today's Date: 2023               Admit Date: 2023    Visit Dx:     ICD-10-CM ICD-9-CM   1. Generalized weakness  R53.1 780.79   2. Fall, initial encounter  W19.XXXA E888.9   3. COVID  U07.1 079.89   4. Elevated troponin  R77.8 790.6   5. Acute on chronic congestive heart failure, unspecified heart failure type  I50.9 428.0     Patient Active Problem List   Diagnosis    Syncope    Parkinson's disease    HTN (hypertension)    HLD (hyperlipidemia)    CAD (coronary artery disease)    CHF (congestive heart failure)    Dehydration    COVID-19 virus detected    COVID-19     History reviewed. No pertinent past medical history.  History reviewed. No pertinent surgical history.    SLP Recommendation and Plan                                                                                         SWALLOW EVALUATION (last 72 hours)       SLP Adult Swallow Evaluation       Row Name 23 1500                    Rehab Evaluation    Document Type evaluation  -LF        Subjective Information no complaints  -LF        Patient Observations alert;cooperative  -LF        Patient Effort good  -LF        Symptoms Noted During/After Treatment fatigue  -LF           General Information    Patient Profile Reviewed yes  -LF        Pertinent History Of Current Problem Pt is a 89 y/o male  -        Current Method of Nutrition NPO  -LF        Precautions/Limitations, Hearing hearing impairment, bilaterally  -        Prior Level of Function-Swallowing unknown  -        Plans/Goals Discussed with patient  -           Oral Motor Structure and Function    Dentition Assessment upper dentures/partial in place;lower dentures/partial in place  -        Secretion Management WNL/WFL  -LF        Mucosal Quality dry  -LF           Oral Musculature and Cranial Nerve Assessment    Oral Motor General Assessment generalized oral motor weakness  -           General Eating/Swallowing Observations    Respiratory Support Currently in Use nasal cannula  -        O2 Liters 3L  -LF        Eating/Swallowing Skills fed by SLP  -        Positioning During Eating upright 90 degree;upright in bed  -        Utensils Used spoon;straw  -        Consistencies Trialed thin liquids;pureed;mixed consistency  -LF           Clinical Swallow Eval    Clinical Swallow Evaluation Summary Pt given trials of thin liquids by straw, puree, and mechanical soft to clinically assess swallow function. All trials fed by SLP d/t oral confusion noted when attempting to self feed. Adequate labial seal w/ no anterior loss. Extended mastication w/ mechanical soft. Disorganized oral transit w/ puree. Cough observed prior to PO. No coughing or other clinical s/s of aspiration observed during PO trials. No oral pocketing/residue. D/t oral confusion and oral phase deficits, recommendpt initiate a puree and thin liquid diet. He should be upright at 90 degrees for all PO,  take small bites/sips, and have feeding assistance during meals d/t oral confusion. ST will continue to follow to ensure diet tolerance and make further recs as indicated.  -           SLP Evaluation Clinical Impression    SLP Swallowing Diagnosis oral dysphagia;R/O pharyngeal dysphagia  -        Functional Impact risk of aspiration/pneumonia;risk of malnutrition  -        Rehab Potential/Prognosis, Swallowing good, to achieve stated therapy goals  -        Swallow Criteria for Skilled Therapeutic Interventions Met demonstrates skilled criteria  -           Recommendations    Therapy Frequency (Swallow) PRN  -        Predicted Duration Therapy Intervention (Days) until discharge  -        SLP Diet Recommendation puree;thin liquids  -        Recommended Diagnostics reassess via clinical swallow evaluation  -        Recommended Precautions and Strategies upright posture during/after eating;small bites of food and sips of liquid;general aspiration precautions;fatigue precautions;assist with feeding  -        Oral Care Recommendations Oral Care BID/PRN;Oral Care before breakfast, after meals and PRN;Denture Care  -        SLP Rec. for Method of Medication Administration meds whole;meds crushed;with puree  -        Monitor for Signs of Aspiration notify SLP if any concerns  -           Swallow Goals (SLP)    Swallow LTGs Swallow Long Term Goal (free text)  -        Swallow STGs diet tolerance goal selection (SLP)  -        Diet Tolerance Goal Selection (SLP) Swallow Short Term Goal 1;Swallow Short Term Goal 2  -           (LTG) Swallow    (LTG) Swallow The patient will maximize swallow function for least restrictive po diet, exhibiting no complications associated with dysphagia, adequate po intake, and demonstrating independent use of safe swallow compensations.  -        Time Frame (Swallow Long Term Goal) by discharge  -        Progress/Outcomes (Swallow Long Term Goal) new goal  -            (STG) Swallow 1    (STG) Swallow 1 The patient will participate in a follow up assessment to determine safety and adequacy of recommended diet, independent use of safe swallow compensations, and additional goals/recommendations to follow  -LF        Time Frame (Swallow Short Term Goal 1) 1 week  -LF        Progress/Outcomes (Swallow Short Term Goal 1) new goal  -LF           (STG) Swallow 2    (STG) Swallow 2 Patient will participate in ongoing assessment of swallow, including reevaluation clinically and/or including instrumental assessment of swallow if indicated, to further assess swallow function and return to oral nutrition  -LF        Time Frame (Swallow Short Term Goal 2) 1 week  -LF        Progress/Outcomes (Swallow Short Term Goal 2) new goal  -LF                  User Key  (r) = Recorded By, (t) = Taken By, (c) = Cosigned By      Initials Name Effective Dates    LF Skylar Garcia, SLP 06/16/21 -                     EDUCATION  The patient has been educated in the following areas:   Dysphagia (Swallowing Impairment) Modified Diet Instruction.        SLP GOALS       Row Name 09/21/23 1500       (LTG) Swallow    (LTG) Swallow The patient will maximize swallow function for least restrictive po diet, exhibiting no complications associated with dysphagia, adequate po intake, and demonstrating independent use of safe swallow compensations.  -PF    Time Frame (Swallow Long Term Goal) by discharge  -PF    Progress/Outcomes (Swallow Long Term Goal) new goal  -PF    Comment (Swallow Long Term Goal) see below  -PF       (STG) Swallow 1    (STG) Swallow 1 The patient will participate in a follow up assessment to determine safety and adequacy of recommended diet, independent use of safe swallow compensations, and additional goals/recommendations to follow  -PF    Time Frame (Swallow Short Term Goal 1) 1 week  -PF    Progress/Outcomes (Swallow Short Term Goal 1) new goal  -PF    Comment (Swallow Short Term Goal  1) Pt was seen for skilled dysphagia therapy to ensure tolerance and safety of recommended diet (puree and thin liquid). RN present and pt was seen at lunch. Pt positioned upright at 90 degree hip flexion in chair. RN reports improved alertness and orientation today. Pt accepted puree and STC trials x2. Feeding assist by nsg, who reports good PO intake since recs. Slightly prolonged but functional mastication on STC texture and no overt s/s of aspiration or respiratory distress noted on STC or thin liquid by straw. Advancing diet to mechanical ground/thin. ST will complete meal f/u to ensure tolerance to rec'd diet and advance diet if indicated.  -PF         (STG) Swallow 2    (STG) Swallow 2 Patient will participate in ongoing assessment of swallow, including reevaluation clinically and/or including instrumental assessment of swallow if indicated, to further assess swallow function and return to oral nutrition  -PF    Time Frame (Swallow Short Term Goal 2) 1 week  -PF    Progress/Outcomes (Swallow Short Term Goal 2) new goal  -PF         User Key  (r) = Recorded By, (t) = Taken By, (c) = Cosigned By      Initials Name Provider Type    PF Fakto, Prangchat, SLP Speech and Language Pathologist            MELLY Sequeira  9/21/2023    Electronically signed by Ventura Velasco SLP at 09/21/23 4676       Smiley Palencia RN     Case Management  Case Management/Social Work     Signed  Date of Service:  09/21/23 1026  Creation Time:  09/21/23 1026     Signed                                                                                                                                  Continued Stay Note  LOIDA Atwood     Patient Name: Jermaine Black                     MRN: [id number]  Today's Date: 9/21/2023                Admit Date: 9/19/2023     Plan: D/C Plan: [facility] accepted; Pre-cert initiated this am; PASSR approved; Transport TBD    Discharge Plan         Row Name 09/21/23 1020            Plan     Plan D/C Plan: [facility] accepted; Pre-cert initiated this am; PASSR approved; Transport TBD                   Expected Discharge Date and Time         Expected Discharge Date Expected Discharge Time     Sep 22, 2023                     CELESTE Lynch Alicia R, PT   Physical Therapist  Physical Therapy  Therapy Evaluation     Signed  Date of Service:  23  Creation Time:  23     Signed        Expand All Collapse All                                                                                                                                                                                                                                                                Patient Name: Jermaine Black                 : 1926                      MRN: [id number]                              Today's Date: 2023                                   Admit Date: 2023                        Visit Dx:   Visit Diagnosis       ICD-10-CM ICD-9-CM   1. Generalized weakness  R53.1 780.79   2. Fall, initial encounter  W19.XXXA E888.9   3. COVID  U07.1 079.89   4. Elevated troponin  R77.8 790.6   5. Acute on chronic congestive heart failure, unspecified heart failure type  I50.9 428.0         Problem List       Patient Active Problem List   Diagnosis    Syncope    Parkinson's disease    HTN (hypertension)    HLD (hyperlipidemia)    CAD (coronary artery disease)    CHF (congestive heart failure)    Dehydration    COVID-19 virus detected    COVID-19         Medical History   History reviewed. No pertinent past medical history.     Surgical History   History reviewed. No pertinent surgical history.       General Information         Row Name 23 1215                 Physical Therapy Time and Intention     Document Type evaluation  -AO       Mode of Treatment physical therapy  -AO          Row Name 23 1215                 General Information      Patient Profile Reviewed yes  -AO       Prior Level of Function --  Previously MOD I w/ household mobility w/ SPC (has RW but reports he wasn't using)  -AO       Existing Precautions/Restrictions oxygen therapy device and L/min;fall;other (see comments)  visual deficit, B [place]  -AO       Barriers to Rehab medically complex;previous functional deficit;visual deficit;hearing deficit  -AO          Row Name 09/20/23 1215                 Living Environment     People in Home spouse  Pt lives w/ wife, has HH aide 2x/week to assist w/ housework  -AO          Row Name 09/20/23 1215                 Home Main Entrance     Number of Stairs, Main Entrance two  -AO       Stair Railings, Main Entrance railings on both sides of stairs  -AO          Row Name 09/20/23 1215                 Stairs Within Home, Primary     Number of Stairs, Within Home, Primary none  -AO          Row Name 09/20/23 1215                 Cognition     Orientation Status (Cognition) oriented x 4  -AO          Row Name 09/20/23 1215                 Safety Issues, Functional Mobility     Safety Issues Affecting Function (Mobility) awareness of need for assistance;insight into deficits/self-awareness;problem-solving;sequencing abilities  -AO       Impairments Affecting Function (Mobility) balance;endurance/activity tolerance;shortness of breath;strength;visual/perceptual  -AO                       User Key  (r) = Recorded By, (t) = Taken By, (c) = Cosigned By        Initials Name Provider Type     AO Emperatriz Sanchez, PT Physical Therapist                            Mobility         Row Name 09/20/23 1215                 Bed Mobility     Bed Mobility supine-sit  -AO       Supine-Sit [place] (Bed Mobility) minimum assist (75% patient effort)  -AO       Assistive Device (Bed Mobility) bed rails  -AO          Row Name 09/20/23 1215                 Bed-Chair Transfer     Bed-Chair [place] (Transfers) moderate assist (50% patient effort)  -AO        Comment, (Bed-Chair Transfer) Stand pivot ist transfer from EOB to recliner  -AO          Row Name 09/20/23 1215                 Sit-Stand Transfer     Sit-Stand [place] (Transfers) minimum assist (75% patient effort)  -AO       Assistive Device (Sit-Stand Transfers) --  KEVINE HHA  -AO          Row Name 09/20/23 1215                 Gait/Stairs (Locomotion)     [place] Level (Gait) unable to assess  -AO                       User Key  (r) = Recorded By, (t) = Taken By, (c) = Cosigned By        Initials Name Provider Type     AO Emperatriz Sanchez, PT Physical Therapist                            Obj/Interventions         Row Name 09/20/23 1215                 Range of Motion Comprehensive     General Range of Motion bilateral lower extremity ROM WFL  -AO          Row Name 09/20/23 1215                 Strength Comprehensive (MMT)     Comment, General Manual Muscle Testing (MMT) Assessment B hip flexion: 4-/5, B knee flexion/extension: 4/5  -AO          Row Name 09/20/23 1215                 Balance     Balance Assessment sitting static balance;sitting dynamic balance;sit to stand dynamic balance;standing static balance;standing dynamic balance  -AO       Static Sitting Balance standby assist  -AO       Dynamic Sitting Balance contact guard  -AO       Position, Sitting Balance unsupported;sitting edge of bed  -AO       Sit to Stand Dynamic Balance minimal assist  -AO       Static Standing Balance minimal assist  -AO       Dynamic Standing Balance moderate assist  -AO          Row Name 09/20/23 1215                 Sensory Assessment (Somatosensory)     Sensory Assessment (Somatosensory) sensation intact  -AO                       User Key  (r) = Recorded By, (t) = Taken By, (c) = Cosigned By        Initials Name Provider Type     Emperatriz Escobar PT Physical Therapist                            Goals/Plan         Row Name 09/20/23 1215                 Bed Mobility Goal 1 (PT)     Activity/Assistive Device  (Bed Mobility Goal 1, PT) bed mobility activities, all  -AO       [place] Level/Cues Needed (Bed Mobility Goal 1, PT) supervision required  -AO       Time Frame (Bed Mobility Goal 1, PT) long term goal (LTG);2 weeks  -AO       Progress/Outcomes (Bed Mobility Goal 1, PT) goal not met  -AO          Row Name 09/20/23 1215                 Transfer Goal 1 (PT)     Activity/Assistive Device (Transfer Goal 1, PT) transfers, all;walker, rolling  -AO       [place] Level/Cues Needed (Transfer Goal 1, PT) contact guard required  -AO       Time Frame (Transfer Goal 1, PT) long term goal (LTG);2 weeks  -AO       Progress/Outcome (Transfer Goal 1, PT) goal not met  -AO          Row Name 09/20/23 1215                 Gait Training Goal 1 (PT)     Activity/Assistive Device (Gait Training Goal 1, PT) gait (walking locomotion);walker, rolling  -AO       [place] Level (Gait Training Goal 1, PT) minimum assist (75% or more patient effort)  -AO       Distance (Gait Training Goal 1, PT) 50 ft  -AO       Time Frame (Gait Training Goal 1, PT) long term goal (LTG);2 weeks  -AO       Progress/Outcome (Gait Training Goal 1, PT) goal not met  -AO          Row Name 09/20/23 121                 Therapy Assessment/Plan (PT)     Planned Therapy Interventions (PT) balance training;bed mobility training;gait training;home exercise program;neuromuscular re-education;patient/family education;transfer training;strengthening  -AO                    User Key  (r) = Recorded By, (t) = Taken By, (c) = Cosigned By        Initials Name Provider Type     AO Emperatriz Sanchez, PT Physical Therapist                         Clinical Impression         Row Name 09/20/23 1302                 Pain     Pretreatment Pain Rating 0/10 - no pain  -AO       Posttreatment Pain Rating 0/10 - no pain  -AO          Row Name 09/20/23 130                 Plan of Care Review     Plan of Care Reviewed With patient  -AO       Progress no change  -AO       Outcome  "Evaluation Pt is a 89 y/o M who presented to [facility] after fall at home w/ reports of \"knees giving out\" (denies striking head) and w/ reports of ~3 week cough. Pt found to have Covid-19 w/ atypical PNA. Pt was recently admitted ~1 month ago for syncopal fall w/ SDH and had gone to [facility], had been home only 3-4 days. Pt also w/ hx of COPD, CHF, Parkinson's disease, CAD, HLD, GERD, BPH, and HTN. At baseline, pt lives w/ wife in [facility] w/ 2 NEREYDA (B HR) and was MOD I w/ SPC (has RW but reports he wasn't using, also has w/c), though reports 5+ falls over the past several months (has HHA 2 days/week to assist w/ housework). During eval, pt requires CGA for supine to sit transfer, MIN A for sit to stand transfer w/ posterior LOB requiring return to sitting EOB, and required MOD A for stand pivot sit transfer from EOB to recliner. Pt w/ Sp02 >95% on 2L throughout session. Pt far from baseline and at high risk for falls, recommending SNF at d/c and plan to see 5x/week at [facility] for progression of mobility. PPE: gloves, N95, gown, eye protection  -AO          Row Name 09/20/23 1301                 Therapy Assessment/Plan (PT)     Rehab Potential (PT) fair, will monitor progress closely  -AO       Criteria for Skilled Interventions Met (PT) yes;meets criteria;skilled treatment is necessary  -AO       Therapy Frequency (PT) 5 times/wk  -AO       Predicted Duration of Therapy Intervention (PT) until d/c  -AO          Row Name 09/20/23 1304                 Vital Signs     Recovery Time vitals stable and WNL on 2L 02  -AO          Row Name 09/20/23 1309                 Positioning and Restraints     Pre-Treatment Position in bed  -AO       Post Treatment Position chair  -AO       In Chair notified nsg;reclined;call light within reach;encouraged to call for assist;exit alarm on  -AO                       User Key  (r) = Recorded By, (t) = Taken By, (c) = Cosigned By        Initials Name Provider Type     Emperatriz Escobar, PT " Physical Therapist                            Outcome Measures         Row Name 09/20/23 1215                 How much help from another person do you currently need...     Turning from your back to your side while in flat bed without using bedrails? 3  -AO       Moving from lying on back to sitting on the side of a flat bed without bedrails? 3  -AO       Moving to and from a bed to a chair (including a wheelchair)? 2  -AO       Standing up from a chair using your arms (e.g., wheelchair, bedside chair)? 3  -AO       Climbing 3-5 steps with a railing? 1  -AO       To walk in hospital room? 1  -AO       AM-PAC 6 Clicks Score (PT) 13  -AO       Highest level of mobility 4 --> Transferred to chair/commode  -AO          Row Name 09/20/23 0924                 Modified [place] Scale     Pre-Stroke Modified [place] Scale 2 - Slight disability.  Unable to carry out all previous activities but able to look after own affairs without assistance.  -       Modified [place] Scale 4 - Moderately severe disability.  Unable to walk without assistance, and unable to attend to own bodily needs without assistance.  -          Row Name 09/20/23 0924                 Functional Assessment     Outcome Measure Options Modified [place]  -                       User Key  (r) = Recorded By, (t) = Taken By, (c) = Cosigned By        Initials Name Provider Type      Mikayla Mcneill, OT Occupational Therapist     Emperatriz Escobar, PT Physical Therapist                          Physical Therapy Education            Title: PT OT SLP Therapies (Done)         Topic: Physical Therapy (Done)         Point: Mobility training (Done)         Learning Progress Summary               Patient Acceptance, E,TB, VU by AO at 9/20/2023 1323     Acceptance, E, VU by AW at 9/20/2023 0903     Acceptance, E, VU by MG at 9/19/2023 1610                               Point: Body mechanics (Done)         Learning Progress Summary               Patient Acceptance,  "E,TB, VU by AO at 9/20/2023 1323     Acceptance, E, VU by AW at 9/20/2023 0903     Acceptance, E, VU by MG at 9/19/2023 1610                               Point: Precautions (Done)         Learning Progress Summary               Patient Acceptance, E, VU by AW at 9/20/2023 0903     Acceptance, E, VU by MG at 9/19/2023 1610                                                   User Key         Initials Effective Dates Name Provider Type Discipline     AO 06/16/21 -  Emperatriz Sanchez, PT Physical Therapist PT     MG 06/16/21 -  Delphine Shankar RN Registered Nurse Nurse     AW 08/15/22 -  Kim Figueroa LPN Licensed Nurse Nurse                          PT Recommendation and Plan  Planned Therapy Interventions (PT): balance training, bed mobility training, gait training, home exercise program, neuromuscular re-education, patient/family education, transfer training, strengthening  Plan of Care Reviewed With: patient  Progress: no change  Outcome Evaluation: Pt is a 89 y/o M who presented to [facility] after fall at home w/ reports of \"knees giving out\" (denies striking head) and w/ reports of ~3 week cough. Pt found to have Covid-19 w/ atypical PNA. Pt was recently admitted ~1 month ago for syncopal fall w/ SDH and had gone to [facility] for rehab, had been home only 3-4 days. Pt also w/ hx of COPD, CHF, Parkinson's disease, CAD, HLD, GERD, BPH, and HTN. At baseline, pt lives w/ wife in [facility] w/ 2 NEREYDA (B HR) and was MOD I w/ SPC (has RW but reports he wasn't using, also has w/c), though reports 5+ falls over the past several months (has HHA 2 days/week to assist w/ housework). During eval, pt requires CGA for supine to sit transfer, MIN A for sit to stand transfer w/ posterior LOB requiring return to sitting EOB, and required MOD A for stand pivot sit transfer from EOB to recliner. Pt w/ Sp02 >95% on 2L throughout session. Pt far from baseline and at high risk for falls, recommending SNF at d/c and plan to see 5x/week at [facility] for " progression of mobility. PPE: gloves, N95, gown, eye protection      Time Calculation:   PT Evaluation Complexity  History, PT Evaluation Complexity: 3 or more personal factors and/or comorbidities  Examination of Body Systems (PT Eval Complexity): total of 3 or more elements  Clinical Presentation (PT Evaluation Complexity): evolving  Clinical Decision Making (PT Evaluation Complexity): moderate complexity  Overall Complexity (PT Evaluation Complexity): moderate complexity       PT Charges         Row Name 09/20/23 1324                       Time Calculation     Start Time 1215  -AO         Stop Time 1241  -AO         Time Calculation (min) 26 min  -AO         PT Received On 09/20/23  -AO         PT - Next Appointment 09/21/23  -AO         PT Goal Re-Cert Due Date 10/04/23  -AO                 Time Calculation- PT     Total Timed Code Minutes- PT 0 minute(s)  -AO                      User Key  (r) = Recorded By, (t) = Taken By, (c) = Cosigned By        Initials Name Provider Type     Emperatriz Escobar, PT Physical Therapist                       Therapy Charges for Today         Code Description Service Date Service Provider Modifiers Qty     [id number]  PT EVAL MOD COMPLEXITY 4 9/20/2023 Emperatriz Sanchez, PT GP 1                PT G-Codes  Outcome Measure Options: Modified [place]  AM-PAC 6 Clicks Score (PT): 13  Modified [place] Scale: 4 - Moderately severe disability.  Unable to walk without assistance, and unable to attend to own bodily needs without assistance.  PT Discharge Summary  Anticipated Discharge Disposition (PT): skilled nursing facility     Emperatriz Sanchez PT               9/20/2023

## 2023-09-25 NOTE — PROGRESS NOTES
CARDIOLOGY PROGRESS NOTE:    Jermaine Black  89 y.o.  male  11/30/1926  [id number]      Referring Provider: Hospitalist    Reason for follow-up: Pneumonia     Patient Care Team:  Provider, No Known as PCP - General    Subjective .  Patient still has some shortness of breath    Objective lying in bed comfortably     Review of Systems   Unable to perform ROS: Mental status change     Allergies: Patient has no known allergies.    Scheduled Meds:budesonide-formoterol, 2 puff, Inhalation, BID - RT  carbidopa-levodopa ER, 1 tablet, Oral, TID  enoxaparin, 40 mg, Subcutaneous, Q24H  erythromycin, 1 application , Both Eyes, Nightly  finasteride, 5 mg, Oral, Daily  furosemide, 40 mg, Oral, Daily  isosorbide mononitrate, 60 mg, Oral, Daily  levothyroxine, 75 mcg, Oral, Daily With Breakfast  metoprolol succinate XL, 50 mg, Oral, Daily  nystatin, 1 application , Topical, Q12H  polyethylene glycol, 17 g, Oral, Daily  rosuvastatin, 10 mg, Oral, Daily  senna-docusate sodium, 1 tablet, Oral, BID  tamsulosin, 0.4 mg, Oral, Daily  vitamin D3, 5,000 Units, Oral, Daily      Continuous Infusions:Pharmacy to Dose enoxaparin (LOVENOX),       PRN Meds:.  acetaminophen    albuterol sulfate HFA    benzonatate    Calcium Replacement - Follow Nurse / BPA Driven Protocol    hydrALAZINE    Magnesium Standard Dose Replacement - Follow Nurse / BPA Driven Protocol    nitroglycerin    ondansetron    Pharmacy to Dose enoxaparin (LOVENOX)    Phosphorus Replacement - Follow Nurse / BPA Driven Protocol    Potassium Replacement - Follow Nurse / BPA Driven Protocol        VITAL SIGNS  Vitals:    09/25/23 0717 09/25/23 0846 09/25/23 0851 09/25/23 1100   BP: 117/81   126/70   BP Location: Right arm   Left arm   Patient Position: Lying   Sitting   Pulse: 73 74 72 70   Resp: 15 16 16 20   Temp: 98.6 °F (37 °C)   97.5 °F (36.4 °C)   TempSrc: Oral   Oral   SpO2: 93%  95% 94%   Weight:       Height:           Flowsheet Rows      Flowsheet Row First Filed Value  "  Admission Height 167.6 cm (66\") Documented at 09/19/2023 0540   Admission Weight 72.6 kg (160 lb) Documented at 09/19/2023 0540             TELEMETRY: Atrial fibrillation with ventricular pacemaker in backup mode    Physical Exam:  Constitutional:       Appearance: Well-developed.   Eyes:      General: No scleral icterus.     Conjunctiva/sclera: Conjunctivae normal.   HENT:      Head: Normocephalic and atraumatic.   Neck:      Vascular: No carotid bruit or JVD.   Pulmonary:      Effort: Pulmonary effort is normal.      Breath sounds: Normal breath sounds. No wheezing. No rales.   Cardiovascular:      Normal rate. Regular rhythm.      Murmurs: There is a systolic murmur.   Pulses:     Intact distal pulses.   Abdominal:      General: Bowel sounds are normal.      Palpations: Abdomen is soft.   Musculoskeletal:      Cervical back: Normal range of motion and neck supple. Skin:     General: Skin is warm and dry.      Findings: No rash.   Neurological:      Mental Status: Alert.        Results Review:   I reviewed the patient's new clinical results.  Lab Results (last 24 hours)       Procedure Component Value Units Date/Time    Basic Metabolic Panel [[id number]]  (Abnormal) Collected: 09/25/23 0214    Specimen: Blood Updated: 09/25/23 0317     Glucose 106 mg/dL      BUN 24 mg/dL      Creatinine 1.04 mg/dL      Sodium 140 mmol/L      Potassium 4.2 mmol/L      Comment: Slight hemolysis detected by analyzer. Results may be affected.        Chloride 98 mmol/L      CO2 37.0 mmol/L      Calcium 8.7 mg/dL      BUN/Creatinine Ratio 23.1     Anion Gap 5.0 mmol/L      eGFR 65.7 mL/min/1.73     Narrative:      GFR Normal >60  Chronic Kidney Disease <60  Kidney Failure <15    The GFR formula is only valid for adults with stable renal function between ages 18 and 70.    CBC & Differential [[id number]]  (Abnormal) Collected: 09/25/23 0214    Specimen: Blood Updated: 09/25/23 0251    Narrative:      The following orders were created for " panel order CBC & Differential.  Procedure                               Abnormality         Status                     ---------                               -----------         ------                     CBC Auto Differential[[id number]]        Abnormal            Final result                 Please view results for these tests on the individual orders.    CBC Auto Differential [[id number]]  (Abnormal) Collected: 09/25/23 0214    Specimen: Blood Updated: 09/25/23 0251     WBC 5.80 10*3/mm3      RBC 4.00 10*6/mm3      Hemoglobin 12.9 g/dL      Hematocrit 38.5 %      MCV 96.4 fL      MCH 32.4 pg      MCHC 33.6 g/dL      RDW 14.4 %      RDW-SD 50.3 fl      MPV 9.6 fL      Platelets 136 10*3/mm3      Neutrophil % 72.7 %      Lymphocyte % 15.3 %      Monocyte % 10.9 %      Eosinophil % 1.0 %      Basophil % 0.1 %      Neutrophils, Absolute 4.20 10*3/mm3      Lymphocytes, Absolute 0.90 10*3/mm3      Monocytes, Absolute 0.60 10*3/mm3      Eosinophils, Absolute 0.10 10*3/mm3      Basophils, Absolute 0.00 10*3/mm3      nRBC 0.1 /100 WBC             Imaging Results (Last 24 Hours)       ** No results found for the last 24 hours. **            EKG      I personally viewed and interpreted the patient's EKG/Telemetry data:    ECHOCARDIOGRAM:  Results for orders placed during the hospital encounter of 09/19/23    Adult Transthoracic Echo Limited W/ Cont if Necessary Per Protocol    Interpretation Summary    Left ventricular ejection fraction appears to be 46 - 50%.    Severe aortic valve stenosis is present.       STRESS MYOVIEW:       CARDIAC CATHETERIZATION:  No results found for this or any previous visit.       OTHER:     Atrial fibrillation  Patient has new onset atrial fibrillation and is having some falls but no dizziness or syncope  Patient's rate is well controlled on beta-blockers, will continue the same dose  Patient's family is not able to have discussed with him and he was not interested in anticoagulation but I  will place him on aspirin at least.  Patient has high risk of falls secondary to Parkinson disease and his age.    History of pacemaker placement  Patient had a pacemaker which is working very well and will follow in the office.  Patient's pacemaker will be interrogated to see for how long if the A-fib has lasted or started in the past.     Severe aortic stenosis  Patient had an echocardiogram which showed severe aortic stenosis with mild LV dysfunction EF of 45 to 50%  Because of his age will continue conservative therapy only     Congestive heart failure  Patient has congestive heart failure with midrange LV ejection fraction EF of 45 to 50% and is currently on beta-blockers  If patient's blood pressure is stable then I will put him on low-dose ACE inhibitors  We will follow him in the office     Hyperlipidemia  Patient is currently on statins     Hypertension  Patient blood pressure currently stable on isosorbide and beta-blockers     Coronary disease  Patient has history of coronary disease in the past and is followed by a cardiologist in the past but no documentation available but is currently stable without any angina and is on beta-blockers and nitrates and will be on aspirin also     History of Parkinson disease  Patient has history of Parkinson disease and is currently taking medicines and it is 1 reason also why he should not be on any anticoagulation    I discussed at length with his daughter who is the POA and explained to him that he will be on aspirin only and not any blood thinners because of his risk of falls especially with his age and also Parkinson disease and she understands.    I discussed the patients findings and my recommendations with patient and nurse    Fili Colón MD  09/25/23  16:58 EDT

## 2023-09-25 NOTE — PAYOR COMM NOTE
"This is a clinical update for Jermaine Black   Reference/Auth # [id number]     PATIENT REMAINS IN HOUSE ON 9/25/23    Please call or fax determination to contact below.   Thank you.    Dottie Harper, RN, BSN  Utilization Review Nurse  [facility]  Direct & confidential phone # [phone]  Fax # [phone]      Jermaine Black (89 y.o. Male)       Date of Birth   11/30/1926    Social Security Number       Address   [address] [place] [address]    Home Phone   [phone]    MRN   [id number]       Mosque   None    Marital Status                               Admission Date   9/19/23    Admission Type   Emergency    Admitting Provider   Rolan Staley MD    Attending Provider   Miriam Jasso MD    Department, Room/Bed   [facility] 2D, 252/1       Discharge Date       Discharge Disposition       Discharge Destination                                 Attending Provider: Miriam Jasso MD    Allergies: No Known Allergies    Isolation: Enh Drop/Con   Infection: COVID (confirmed) (09/19/23)   Code Status: No CPR    Ht: 167.6 cm (66\")   Wt: 71.5 kg (157 lb 10.1 oz)    Admission Cmt: None   Principal Problem: COVID-19 [U07.1]                   Active Insurance as of 9/19/2023       Primary Coverage       Payor Plan Insurance Group Employer/Plan Group    AETNA MEDICARE REPLACEMENT AETNA MEDICARE REPLACEMENT [id number]       Payor Plan Address Payor Plan Phone Number Payor Plan Fax Number Effective Dates    [address] [phone]  1/1/2022 - None Entered    [place] [address]         Subscriber Name Subscriber Birth Date Member ID       JERMAINE BLACK 11/30/1926 [id number]                     Emergency Contacts        (Rel.) Home Phone Work Phone Mobile Phone    Wayne-NicolasRachel khalil (Daughter) -- -- [phone]    Gerri Walker (Daughter) -- -- [phone]    Skylar Rutledge (Daughter) [phone] -- --                 Physician Progress Notes " (last 24 hours)        Evelyn Villavicencio MD at 09/25/23 3645          Daily Progress Note          Assessment    Acute hypoxic respiratory insufficiency  COVID-19  Atypical pneumonia  COPD  CHF  BPH  HTN  Parkinson's disease   Hypothyroidism  GERD  CAD   Results for orders placed during the hospital encounter of 09/19/23    Adult Transthoracic Echo Limited W/ Cont if Necessary Per Protocol    Interpretation Summary    Left ventricular ejection fraction appears to be 46 - 50%.    Severe aortic valve stenosis is present.       PLAN:    Oxygen titration currently on 2 L   antibiotics: Rocephin changed to Ceftin , completed    Dexamethasone completed    Bronchodilators  Inhaled corticosteroids  Incentive spirometer  Electrolytes/ glycemic control  Thyroid hormone replacement  BP control  Crestor  Diuresis  DVT  prophylaxis-Lovenox            LOS: 6 days     Subjective     Afebrile, patient reports  cough but no shortness of breath    Objective     Vital signs for last 24 hours:  Vitals:    09/24/23 2140 09/25/23 0040 09/25/23 0321 09/25/23 0717   BP:  134/75 132/77 117/81   BP Location:   Right arm Right arm   Patient Position:   Lying Lying   Pulse: 72 83 71 73   Resp: 20 19 15   Temp:  98.2 °F (36.8 °C) 98.4 °F (36.9 °C) 98.6 °F (37 °C)   TempSrc:   Oral Oral   SpO2: 95% 97% 97% 93%   Weight:   71.5 kg (157 lb 10.1 oz)    Height:           Intake/Output last 3 shifts:  I/O last 3 completed shifts:  In: 720 [P.O.:720]  Out: 850 [Urine:850]  Intake/Output this shift:  No intake/output data recorded.      Radiology  Imaging Results (Last 24 Hours)       ** No results found for the last 24 hours. **            Labs:  Results from last 7 days   Lab Units 09/25/23  0214   WBC 10*3/mm3 5.80   HEMOGLOBIN g/dL 12.9*   HEMATOCRIT % 38.5   PLATELETS 10*3/mm3 136*       Results from last 7 days   Lab Units 09/25/23  0214   SODIUM mmol/L 140   POTASSIUM mmol/L 4.2   CHLORIDE mmol/L 98   CO2 mmol/L 37.0*   BUN mg/dL 24*   CREATININE  mg/dL 1.04   CALCIUM mg/dL 8.7   GLUCOSE mg/dL 106*               Results from last 7 days   Lab Units 09/19/23 2011 09/19/23  0931 09/19/23  0729   HSTROP T ng/L 116* 66* 76*                             Meds:   SCHEDULE  budesonide-formoterol, 2 puff, Inhalation, BID - RT  carbidopa-levodopa ER, 1 tablet, Oral, TID  enoxaparin, 40 mg, Subcutaneous, Q24H  erythromycin, 1 application , Both Eyes, Nightly  finasteride, 5 mg, Oral, Daily  furosemide, 40 mg, Oral, Daily  isosorbide mononitrate, 60 mg, Oral, Daily  levothyroxine, 75 mcg, Oral, Daily With Breakfast  metoprolol succinate XL, 50 mg, Oral, Daily  nystatin, 1 application , Topical, Q12H  polyethylene glycol, 17 g, Oral, Daily  rosuvastatin, 10 mg, Oral, Daily  senna-docusate sodium, 1 tablet, Oral, BID  tamsulosin, 0.4 mg, Oral, Daily  vitamin D3, 5,000 Units, Oral, Daily      Infusions  Pharmacy to Dose enoxaparin (LOVENOX),       PRNs    acetaminophen    albuterol sulfate HFA    benzonatate    Calcium Replacement - Follow Nurse / BPA Driven Protocol    hydrALAZINE    Magnesium Standard Dose Replacement - Follow Nurse / BPA Driven Protocol    nitroglycerin    ondansetron    Pharmacy to Dose enoxaparin (LOVENOX)    Phosphorus Replacement - Follow Nurse / BPA Driven Protocol    Potassium Replacement - Follow Nurse / BPA Driven Protocol    Physical Exam:  General Appearance:  Alert   HEENT:  Normocephalic, without obvious abnormality, L mild erythema and dryness in conjunctiva,   nares normal, no drainage     Neck:  Supple, symmetrical, trachea midline.   Lungs /Chest wall:   Mild bilateral basal rhonchi, respirations unlabored, symmetrical wall movement.     Heart:  Regular rate and rhythm, S1 S2 normal  Abdomen: Soft, non-tender, no masses, no organomegaly.    Extremities: No edema, no clubbing or cyanosis     ROS  Constitutional: Negative for chills, fever and positive for malaise/fatigue.   HENT: Negative.    Eyes: Negative.    Cardiovascular: Negative.     Respiratory: Positive for cough and negative for shortness of breath.    Skin: Negative.    Musculoskeletal: Negative.    Gastrointestinal: Negative.    Genitourinary: Negative.    Neurological: Generalized weakness      I reviewed the recent clinical results    Part of this note may be an electronic transcription/translation of spoken language to printed text using the Dragon Dictation System.      Electronically signed by Evelyn Villavicencio MD at 09/25/23 0870

## 2023-09-25 NOTE — PROGRESS NOTES
Hospitalist Progress Note   Jermaine Black : 1926 MRN:[id number] LOS:6     Principal Problem: COVID-19     Reason for follow up: All the medical problems listed below    Summary     Jermaine Black is a 89 y.o. male with a history of COPD, CHF with unknown EF, Parkinson's Disease, Hypothyroidism, CAD, Hyperlipidemia, GERD, BPH, and HTN who came to the ER after a fall on the morning of admission. Patient states that his legs gave out and he fell onto his knees but did not hit his head. He further states he has had a cough for the past 3 weeks but no sputum production. In the ER patient noted with COVID-19 with atypical pneumonia as well as possible CHF exacerbation with elevated BNP.  Hypoxic requiring 2 to 3 L nasal cannula, started on dexamethasone and Lasix and pulmonology also consulted     Assessment / Plan     #Acute hypoxemic respiratory failure, improved  COVID-19 infection with atypical pneumonia  -Chest x-ray findings noted  -Continue bronchodilators and dexamethasone  -Improved and weaned down to room air now  -Encourage spirometry  -On oral antibiotics per pulmonology  -Okay to discharge per pulmonology  -Awaiting placement      #Acute on chronic HFrEF  Severe aortic stenosis  -Echo this admission shows EF of 40 to 45% and severe aortic stenosis noted  -Medical management now for diuresis with Lasix as tolerated, monitor I's and O's  -Due to advanced age, patient likely not a candidate for any aggressive therapy for aortic stenosis  -Continue GDMT as tolerated  -Cardiology signed off     #PAF  SSS s/p pacemaker  -Diagnosed with A-fib during this admission, rate well controlled  -Continue beta-blocker and monitor on telemetry  -Patient very high fall risk, not a candidate for full anticoagulation with also recent history of questionable SAH noted during previous admission  -Cardiology s/o     #Elevated D-dimer  -Likely secondary to COVID, less likely VTE  -CTA chest negative for PE  -Monitor      #Parkinson's disease  Deconditioning  -Continue home meds  -PT/OT     #Hypertension  #Hyperlipidemia  -Blood pressure controlled  -Continue home meds as tolerated, monitor     #Hypothyroidism  #GERD  #BPH  -Continue home meds as tolerated, monitor    Disposition: Rehab     Subjective / Review of systems     Review of Systems   He told that he drink alcohol this morning to be confused.    Objective / Physical Exam   Vital signs:  Temp: 97.5 °F (36.4 °C)  BP: 126/70  Heart Rate: 70  Resp: 20  SpO2: 94 %  Weight: 71.5 kg (157 lb 10.1 oz)    Admission Weight: Weight: 72.6 kg (160 lb)  Current Weight: Weight: 71.5 kg (157 lb 10.1 oz)    Input/Output in last 24 hours:    Intake/Output Summary (Last 24 hours) at 9/25/2023 1635  Last data filed at 9/25/2023 0812  Gross per 24 hour   Intake 840 ml   Output 400 ml   Net 440 ml      Physical Exam   Physical Exam  HENT:      Head: Normocephalic and atraumatic.      Nose: Nose normal.   Eyes:      Extraocular Movements: Extraocular movements intact.      Conjunctiva/sclera: Conjunctivae normal.      Pupils: Pupils are equal, round, and reactive to light.   Cardiovascular:      Rate and Rhythm: normal       Pulses: Normal pulses.      Heart sounds: Normal heart sounds.   Pulmonary:      Effort: normal      Breath sounds: normal   Abdominal:      General: Abdomen is flat. Bowel sounds are normal.      Palpations: Abdomen is soft.   Musculoskeletal:         General: Normal range of motion.      Cervical back: Normal range of motion and neck supple.    Psychiatric:         Mood and Affect: AAO 1, confused        Radiology and Labs     Results from last 7 days   Lab Units 09/25/23  0214 09/24/23  0021 09/23/23  0145 09/22/23  0049 09/21/23  0226   WBC 10*3/mm3 5.80 4.60 5.80 6.20 7.20   HEMATOCRIT % 38.5 34.8* 34.8* 35.4* 35.7*   PLATELETS 10*3/mm3 136* 132* 132* 140 125*      Results from last 7 days   Lab Units 09/25/23  0214 09/24/23  0021 09/23/23  0145 09/22/23  0049  09/21/23  1346 09/21/23  0226   SODIUM mmol/L 140 140 141 137  --  140   POTASSIUM mmol/L 4.2 4.0 4.0 3.9 4.3 3.3*   CHLORIDE mmol/L 98 98 99 97*  --  97*   CO2 mmol/L 37.0* 34.0* 35.0* 35.0*  --  36.0*   BUN mg/dL 24* 28* 29* 29*  --  27*   CREATININE mg/dL 1.04 1.00 1.05 1.08  --  1.05      Current medications   Scheduled Meds: budesonide-formoterol, 2 puff, Inhalation, BID - RT  carbidopa-levodopa ER, 1 tablet, Oral, TID  enoxaparin, 40 mg, Subcutaneous, Q24H  erythromycin, 1 application , Both Eyes, Nightly  finasteride, 5 mg, Oral, Daily  furosemide, 40 mg, Oral, Daily  isosorbide mononitrate, 60 mg, Oral, Daily  levothyroxine, 75 mcg, Oral, Daily With Breakfast  metoprolol succinate XL, 50 mg, Oral, Daily  nystatin, 1 application , Topical, Q12H  polyethylene glycol, 17 g, Oral, Daily  rosuvastatin, 10 mg, Oral, Daily  senna-docusate sodium, 1 tablet, Oral, BID  tamsulosin, 0.4 mg, Oral, Daily  vitamin D3, 5,000 Units, Oral, Daily      Continuous Infusions: Pharmacy to Dose enoxaparin (LOVENOX),         Reviewed all other data in the last 24 hours, including but not limited to vitals, labs, microbiology, imaging and pertinent notes from other providers.     Miriam Jasso MD   [facility]  09/25/23   16:35 EDT

## 2023-09-25 NOTE — PLAN OF CARE
Goal Outcome Evaluation:  Plan of Care Reviewed With: patient, daughter        Progress: no change       Awaiting for SNF placement to return. Up in chair at bedside for period today. Did participate in PT today. Staff assisting with meals. Requiring max assist with mobility/self-care. Remains risk for falls & skin injury.

## 2023-09-25 NOTE — THERAPY TREATMENT NOTE
"Subjective: Pt agreeable to therapeutic plan of care.    Objective:     Bed mobility - Min-A  Transfers - Min-A with rolling walker from toilet  Ambulation - 14 feet x 2 CGA, Min-A, and with rolling walker    Therapeutic Exercise - 15 Reps B LE AAROM supported sitting / chair    Vitals: WNL on room air    Pain: 0 VAS   Location: na    Education: Verbal/Tactile Cues, ADL training, Transfer Training, and Gait Training    Assessment: Jermaine Black presents with functional mobility impairments which indicate the need for skilled intervention. Patient needing verbal and tactile cues due to poor vision ; thus have to be assisted to clear obstacles during gait . Patient needed assistance with self feeding as well. Tolerated LE ex in chair. Min A to return supine mostly with LE. Tolerating session today without incident. Will continue to follow and progress as tolerated.     Plan/Recommendations:   Moderate Intensity Therapy recommended post-acute care. This is recommended as therapy feels the patient would require 3-4 days per week and wouldn't tolerate \"3 hour daily\" rehab intensity. SNF would be the preferred choice. If the patient does not agree to SNF, arrange HH or OP depending on home bound status. If patient is medically complex, consider LTACH.. Pt requires no DME at discharge.     Pt desires Skilled Rehab placement at discharge. Pt cooperative; agreeable to therapeutic recommendations and plan of care.         Basic Mobility 6-click:  Rollin = Total, A lot = 2, A little = 3; 4 = None  Supine>Sit:   1 = Total, A lot = 2, A little = 3; 4 = None   Sit>Stand with arms:  1 = Total, A lot = 2, A little = 3; 4 = None  Bed>Chair:   1 = Total, A lot = 2, A little = 3; 4 = None  Ambulate in room:  1 = Total, A lot = 2, A little = 3; 4 = None  3-5 Steps with railin = Total, A lot = 2, A little = 3; 4 = None  Score: 18      Post-Tx Position: Supine with HOB Elevated, Alarms activated, and Call light and " personal items within reach  PPE: gloves, gown, eye protection, and N95

## 2023-09-26 ENCOUNTER — TELEPHONE (OUTPATIENT)
Dept: CARDIOLOGY | Facility: CLINIC | Age: 88
End: 2023-09-26
Payer: MEDICARE

## 2023-09-26 VITALS
WEIGHT: 156.53 LBS | DIASTOLIC BLOOD PRESSURE: 49 MMHG | SYSTOLIC BLOOD PRESSURE: 97 MMHG | OXYGEN SATURATION: 96 % | HEART RATE: 79 BPM | TEMPERATURE: 96.8 F | BODY MASS INDEX: 25.16 KG/M2 | RESPIRATION RATE: 21 BRPM | HEIGHT: 66 IN

## 2023-09-26 LAB
ALBUMIN SERPL-MCNC: 2.8 G/DL (ref 3.5–5.2)
ALBUMIN/GLOB SERPL: 1 G/DL
ALP SERPL-CCNC: 49 U/L (ref 39–117)
ALT SERPL W P-5'-P-CCNC: 8 U/L (ref 1–41)
ANION GAP SERPL CALCULATED.3IONS-SCNC: 8 MMOL/L (ref 5–15)
AST SERPL-CCNC: 30 U/L (ref 1–40)
BASOPHILS # BLD AUTO: 0 10*3/MM3 (ref 0–0.2)
BASOPHILS NFR BLD AUTO: 0.1 % (ref 0–1.5)
BILIRUB SERPL-MCNC: 0.4 MG/DL (ref 0–1.2)
BUN SERPL-MCNC: 17 MG/DL (ref 8–23)
BUN/CREAT SERPL: 18.9 (ref 7–25)
CALCIUM SPEC-SCNC: 8.3 MG/DL (ref 8.2–9.6)
CHLORIDE SERPL-SCNC: 99 MMOL/L (ref 98–107)
CO2 SERPL-SCNC: 32 MMOL/L (ref 22–29)
CREAT SERPL-MCNC: 0.9 MG/DL (ref 0.76–1.27)
DEPRECATED RDW RBC AUTO: 50.3 FL (ref 37–54)
EGFRCR SERPLBLD CKD-EPI 2021: 78.2 ML/MIN/1.73
EOSINOPHIL # BLD AUTO: 0.1 10*3/MM3 (ref 0–0.4)
EOSINOPHIL NFR BLD AUTO: 1.3 % (ref 0.3–6.2)
ERYTHROCYTE [DISTWIDTH] IN BLOOD BY AUTOMATED COUNT: 14.2 % (ref 12.3–15.4)
GLOBULIN UR ELPH-MCNC: 2.7 GM/DL
GLUCOSE SERPL-MCNC: 89 MG/DL (ref 65–99)
HCT VFR BLD AUTO: 37.8 % (ref 37.5–51)
HGB BLD-MCNC: 12.7 G/DL (ref 13–17.7)
LYMPHOCYTES # BLD AUTO: 0.8 10*3/MM3 (ref 0.7–3.1)
LYMPHOCYTES NFR BLD AUTO: 11.7 % (ref 19.6–45.3)
MAGNESIUM SERPL-MCNC: 2.1 MG/DL (ref 1.7–2.3)
MCH RBC QN AUTO: 32.3 PG (ref 26.6–33)
MCHC RBC AUTO-ENTMCNC: 33.7 G/DL (ref 31.5–35.7)
MCV RBC AUTO: 96.1 FL (ref 79–97)
MONOCYTES # BLD AUTO: 0.7 10*3/MM3 (ref 0.1–0.9)
MONOCYTES NFR BLD AUTO: 10.7 % (ref 5–12)
NEUTROPHILS NFR BLD AUTO: 5.1 10*3/MM3 (ref 1.7–7)
NEUTROPHILS NFR BLD AUTO: 76.2 % (ref 42.7–76)
NRBC BLD AUTO-RTO: 0.1 /100 WBC (ref 0–0.2)
PHOSPHATE SERPL-MCNC: 3.4 MG/DL (ref 2.5–4.5)
PLATELET # BLD AUTO: 121 10*3/MM3 (ref 140–450)
PMV BLD AUTO: 9.8 FL (ref 6–12)
POTASSIUM SERPL-SCNC: 4 MMOL/L (ref 3.5–5.2)
PROT SERPL-MCNC: 5.5 G/DL (ref 6–8.5)
RBC # BLD AUTO: 3.94 10*6/MM3 (ref 4.14–5.8)
SODIUM SERPL-SCNC: 139 MMOL/L (ref 136–145)
WBC NRBC COR # BLD: 6.6 10*3/MM3 (ref 3.4–10.8)

## 2023-09-26 PROCEDURE — 80053 COMPREHEN METABOLIC PANEL: CPT | Performed by: INTERNAL MEDICINE

## 2023-09-26 PROCEDURE — 92526 ORAL FUNCTION THERAPY: CPT

## 2023-09-26 PROCEDURE — 94761 N-INVAS EAR/PLS OXIMETRY MLT: CPT

## 2023-09-26 PROCEDURE — 83735 ASSAY OF MAGNESIUM: CPT | Performed by: INTERNAL MEDICINE

## 2023-09-26 PROCEDURE — 84100 ASSAY OF PHOSPHORUS: CPT | Performed by: INTERNAL MEDICINE

## 2023-09-26 PROCEDURE — 85025 COMPLETE CBC W/AUTO DIFF WBC: CPT | Performed by: INTERNAL MEDICINE

## 2023-09-26 PROCEDURE — 99232 SBSQ HOSP IP/OBS MODERATE 35: CPT | Performed by: INTERNAL MEDICINE

## 2023-09-26 PROCEDURE — 94799 UNLISTED PULMONARY SVC/PX: CPT

## 2023-09-26 RX ORDER — NYSTATIN 100000 U/G
1 CREAM TOPICAL EVERY 12 HOURS SCHEDULED
Qty: 60 G | Refills: 0 | Status: SHIPPED | OUTPATIENT
Start: 2023-09-26 | End: 2023-10-26

## 2023-09-26 RX ORDER — BUDESONIDE AND FORMOTEROL FUMARATE DIHYDRATE 160; 4.5 UG/1; UG/1
2 AEROSOL RESPIRATORY (INHALATION)
Qty: 6 G | Refills: 0 | Status: SHIPPED | OUTPATIENT
Start: 2023-09-26 | End: 2023-10-26

## 2023-09-26 RX ORDER — AMOXICILLIN 250 MG
1 CAPSULE ORAL 2 TIMES DAILY
Qty: 60 TABLET | Refills: 0 | Status: CANCELLED | OUTPATIENT
Start: 2023-09-26 | End: 2023-10-26

## 2023-09-26 RX ORDER — ASPIRIN 81 MG/1
81 TABLET, CHEWABLE ORAL DAILY
Qty: 30 TABLET | Refills: 0 | Status: SHIPPED | OUTPATIENT
Start: 2023-09-26 | End: 2023-10-26

## 2023-09-26 RX ORDER — POLYETHYLENE GLYCOL 3350 17 G/17G
17 POWDER, FOR SOLUTION ORAL DAILY
Qty: 30 PACKET | Refills: 0 | Status: SHIPPED | OUTPATIENT
Start: 2023-09-26 | End: 2023-10-26

## 2023-09-26 RX ADMIN — METOPROLOL SUCCINATE 50 MG: 50 TABLET, EXTENDED RELEASE ORAL at 07:25

## 2023-09-26 RX ADMIN — SENNOSIDES AND DOCUSATE SODIUM 1 TABLET: 50; 8.6 TABLET ORAL at 07:25

## 2023-09-26 RX ADMIN — TAMSULOSIN HYDROCHLORIDE 0.4 MG: 0.4 CAPSULE ORAL at 07:24

## 2023-09-26 RX ADMIN — ISOSORBIDE MONONITRATE 60 MG: 60 TABLET, EXTENDED RELEASE ORAL at 07:26

## 2023-09-26 RX ADMIN — POLYETHYLENE GLYCOL 3350 17 G: 17 POWDER, FOR SOLUTION ORAL at 07:26

## 2023-09-26 RX ADMIN — ASPIRIN 81 MG: 81 TABLET, CHEWABLE ORAL at 07:26

## 2023-09-26 RX ADMIN — Medication 5000 UNITS: at 07:25

## 2023-09-26 RX ADMIN — FUROSEMIDE 40 MG: 40 TABLET ORAL at 07:25

## 2023-09-26 RX ADMIN — CARBIDOPA AND LEVODOPA 1 TABLET: 25; 100 TABLET, EXTENDED RELEASE ORAL at 07:26

## 2023-09-26 RX ADMIN — FINASTERIDE 5 MG: 5 TABLET, FILM COATED ORAL at 07:25

## 2023-09-26 RX ADMIN — NYSTATIN 1 APPLICATION: 100000 CREAM TOPICAL at 07:23

## 2023-09-26 RX ADMIN — LEVOTHYROXINE SODIUM 75 MCG: 0.07 TABLET ORAL at 06:37

## 2023-09-26 RX ADMIN — BUDESONIDE AND FORMOTEROL FUMARATE DIHYDRATE 2 PUFF: 160; 4.5 AEROSOL RESPIRATORY (INHALATION) at 10:52

## 2023-09-26 RX ADMIN — ROSUVASTATIN 10 MG: 10 TABLET, FILM COATED ORAL at 07:26

## 2023-09-26 NOTE — DISCHARGE SUMMARY
[facility] Medicine Services  Discharge Summary    Date of Service: 23   Patient Name: Jermaine Black  : 1926  MRN: [id number]    Date of Admission: 2023  Discharge Diagnosis: Covid   Date of Discharge:  23   Primary Care Physician: Provider, No Known      Presenting Problem:   Elevated troponin [R77.8]  Generalized weakness [R53.1]  Fall, initial encounter [W19.XXXA]  Acute on chronic congestive heart failure, unspecified heart failure type [I50.9]  COVID [U07.1]  COVID-19 [U07.1]    Active and Resolved Hospital Problems:  Active Hospital Problems    Diagnosis POA    **COVID-19 [U07.1] Yes    Moderate malnutrition [E44.0] Yes    COVID-19 virus detected [U07.1] Yes      Resolved Hospital Problems   No resolved problems to display.         Hospital Course     Hospital Course:  Jermaine Black is a 89 y.o. male with a history of COPD, CHF with unknown EF, Parkinson's Disease, Hypothyroidism, CAD, Hyperlipidemia, GERD, BPH, and HTN who came to the ER after a fall on the morning of admission. Patient states that his legs gave out and he fell onto his knees but did not hit his head. He further states he has had a cough for the past 3 weeks but no sputum production. In the ER patient noted with COVID-19 with atypical pneumonia as well as possible CHF exacerbation with elevated BNP.  Hypoxic requiring 2 to 3 L nasal cannula, started on dexamethasone and Lasix and pulmonology also consulted. Patient has been clinically stable. Cardio is planning to get the pacemaker to [facility] in 2 to 3 weeks as outpatient.  Will need to follow-up with Dr. Stoddard.         DISCHARGE Follow Up Recommendations for labs and diagnostics:  Follow up Dr stoddard       Reasons For Change In Medications and Indications for New Medications:      Day of Discharge     Vital Signs:  Temp:  [96.8 °F (36 °C)-98.6 °F (37 °C)] 96.8 °F (36 °C)  Heart Rate:  [70-79] 79  Resp:  [15-22] 21  BP: ()/(49-81)  97/49    Physical Exam:  HENT:      Head: Normocephalic and atraumatic.      Nose: Nose normal.   Eyes:      Extraocular Movements: Extraocular movements intact.      Conjunctiva/sclera: Conjunctivae normal.      Pupils: Pupils are equal, round, and reactive to light.   Cardiovascular:      Rate and Rhythm: normal       Pulses: Normal pulses.      Heart sounds: Normal heart sounds.   Pulmonary:      Effort: normal      Breath sounds: normal   Abdominal:      General: Abdomen is flat. Bowel sounds are normal.      Palpations: Abdomen is soft.   Musculoskeletal:         General: Normal range of motion.      Cervical back: Normal range of motion and neck supple.    Psychiatric:         Mood and Affect: AAO 1, confused         Pertinent  and/or Most Recent Results     LAB RESULTS:      Lab 09/26/23  0317 09/25/23  0214 09/24/23  0021 09/23/23  0145 09/22/23  0049   WBC 6.60 5.80 4.60 5.80 6.20   HEMOGLOBIN 12.7* 12.9* 11.9* 11.7* 11.7*   HEMATOCRIT 37.8 38.5 34.8* 34.8* 35.4*   PLATELETS 121* 136* 132* 132* 140   NEUTROS ABS 5.10 4.20 3.30 4.30 4.80   LYMPHS ABS 0.80 0.90 0.60* 0.60* 0.50*   MONOS ABS 0.70 0.60 0.70 0.90 0.80   EOS ABS 0.10 0.10 0.00 0.00 0.00   MCV 96.1 96.4 96.1 96.9 97.2*         Lab 09/26/23  0317 09/25/23  0214 09/24/23  0021 09/23/23  0145 09/22/23  0049   SODIUM 139 140 140 141 137   POTASSIUM 4.0 4.2 4.0 4.0 3.9   CHLORIDE 99 98 98 99 97*   CO2 32.0* 37.0* 34.0* 35.0* 35.0*   ANION GAP 8.0 5.0 8.0 7.0 5.0   BUN 17 24* 28* 29* 29*   CREATININE 0.90 1.04 1.00 1.05 1.08   EGFR 78.2 65.7 68.9 65.0 62.8   GLUCOSE 89 106* 132* 139* 149*   CALCIUM 8.3 8.7 8.4 8.0* 8.0*   MAGNESIUM 2.1  --   --   --   --    PHOSPHORUS 3.4  --   --   --   --          Lab 09/26/23  0317   TOTAL PROTEIN 5.5*   ALBUMIN 2.8*   GLOBULIN 2.7   ALT (SGPT) 8   AST (SGOT) 30   BILIRUBIN 0.4   ALK PHOS 49         Lab 09/19/23 2011   HSTROP T 116*                 Brief Urine Lab Results  (Last result in the past 365 days)         Color   Clarity   Blood   Leuk Est   Nitrite   Protein   CREAT   Urine HCG        09/19/23 0809 Yellow   Clear   Moderate (2+)   Negative   Negative   Negative                 Microbiology Results (last 10 days)       Procedure Component Value - Date/Time    Respiratory Panel PCR w/COVID-19(SARS-CoV-2) KATIE/JIMENA/VIKTORIA/PAD/COR/MAD/LEOLA In-House, NP Swab in UTM/VTM, 3-4 HR TAT - Swab, Nasopharynx [[id number]]  (Abnormal) Collected: 09/19/23 0730    Lab Status: Final result Specimen: Swab from Nasopharynx Updated: 09/19/23 0830     ADENOVIRUS, PCR Not Detected     Coronavirus 229E Not Detected     Coronavirus HKU1 Not Detected     Coronavirus NL63 Not Detected     Coronavirus OC43 Not Detected     COVID19 Detected     Human Metapneumovirus Not Detected     Human Rhinovirus/Enterovirus Not Detected     Influenza A PCR Not Detected     Influenza B PCR Not Detected     Parainfluenza Virus 1 Not Detected     Parainfluenza Virus 2 Not Detected     Parainfluenza Virus 3 Not Detected     Parainfluenza Virus 4 Not Detected     RSV, PCR Not Detected     Bordetella pertussis pcr Not Detected     Bordetella parapertussis PCR Not Detected     Chlamydophila pneumoniae PCR Not Detected     Mycoplasma pneumo by PCR Not Detected    Narrative:      In the setting of a positive respiratory panel with a viral infection PLUS a negative procalcitonin without other underlying concern for bacterial infection, consider observing off antibiotics or discontinuation of antibiotics and continue supportive care. If the respiratory panel is positive for atypical bacterial infection (Bordetella pertussis, Chlamydophila pneumoniae, or Mycoplasma pneumoniae), consider antibiotic de-escalation to target atypical bacterial infection.            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 Electronically Signed: Erik Baer MD  9/19/2023 6:27 AM EDT  Workstation ID:  [id number]    CT Head Without Contrast    Result Date: 9/19/2023  Impression: Impression: 1.No acute intracranial abnormality. 2.Moderate chronic small vessel ischemic change. Electronically Signed: Erik Baer MD  9/19/2023 6:56 AM EDT  Workstation ID: [id number]    CT Head Without Contrast    Result Date: 8/31/2023  Impression: Impression: 1.Left frontal lobe hyperdensity is essentially unchanged from prior studies. Given the lack of evolution, this most likely represents a calcification rather than an intracranial hemorrhage. No definite intracranial hemorrhage. 2.Unchanged right frontal convexity low density fluid collection measuring 1.2 cm. Electronically Signed: Chemo Vargas DO  8/31/2023 6:31 PM EDT  Workstation ID: [id number]    CT Head Without Contrast    Result Date: 8/31/2023  Impression: Impression: 1. Stable 3-4 mm subtle hyperdensity adjacent to the left superior frontal gyrus. In the absence of trauma, this may represent a parenchymal calcification. Otherwise, this still could represent a small focus of subarachnoid hemorrhage. There do not appear to be significant external signs of trauma to the head. Consider 24-hour follow-up head CT. 2. Moderate chronic small vessel ischemic change and moderate generalized parenchymal volume loss. Electronically Signed: Erik Baer MD  8/31/2023 12:18 AM EDT  Workstation ID: [id number]    CT Head Without Contrast    Result Date: 8/30/2023  Impression: Impression: 1.Subtle hyperdensity in the left frontal lobe may represent calcifications, although a trace subarachnoid hemorrhage is not completely excluded. Please consider repeat CT in 4 to 6 hours to document stability. 2.Low-density fluid along the right frontal convexity, measuring 1.2 cm in thickness, is concerning for an old subdural hemorrhage versus subdural hygroma. Electronically Signed: Chemo Vargas DO  8/30/2023 7:15 PM EDT  Workstation ID: [id number]    XR Chest 1 View    Result Date:  9/19/2023  Impression: Impression: Mixed interstitial and airspace disease throughout the right lung and at the left lung base which may relate to pulmonary edema or multifocal atypical/viral pneumonia. Electronically Signed: Farzad Moss MD  9/19/2023 7:43 AM EDT  Workstation ID: [id number]    XR Chest 1 View    Result Date: 8/30/2023  Impression: Impression: Low lung volumes accentuates pulmonary vasculature and cardiomediastinal silhouette. No definite acute cardiopulmonary abnormality. Electronically Signed: Chemo Vargas,   8/30/2023 5:50 PM EDT  Workstation ID: [id number]    CT Angiogram Chest Pulmonary Embolism    Result Date: 9/20/2023  Impression: Impression: 1. Negative for pulmonary embolus. 2. Cardiomegaly and pulmonary edema with moderate bilateral pleural effusions right greater than left. 3. Coronary artery and aortic valve calcifications. 4. Reflux of contrast into the hepatic veins suggesting right heart insufficiency. 5. Additional chronic findings above. Electronically Signed: Farzad Moss MD  9/20/2023 12:26 PM EDT  Workstation ID: [id number]             Results for orders placed during the hospital encounter of 09/19/23    Adult Transthoracic Echo Limited W/ Cont if Necessary Per Protocol    Interpretation Summary    Left ventricular ejection fraction appears to be 46 - 50%.    Severe aortic valve stenosis is present.      Labs Pending at Discharge:      Procedures Performed           Consults:   Consults       Date and Time Order Name Status Description    9/20/2023  3:17 PM Inpatient Cardiology Consult      9/19/2023  2:00 PM Inpatient Pulmonology Consult Completed     9/19/2023 10:13 AM Hospitalist (on-call MD unless specified)      8/31/2023  9:15 AM Inpatient Neurosurgery Consult Completed     8/30/2023  7:33 PM Neurosurgery (on-call MD unless specified) Completed               Discharge Details        Discharge Medications        New Medications        Instructions Start Date   aspirin 81  MG chewable tablet   81 mg, Oral, Daily      budesonide-formoterol 160-4.5 MCG/ACT inhaler  Commonly known as: SYMBICORT   2 puffs, Inhalation, 2 Times Daily - RT      nystatin 120289 UNIT/GM cream  Commonly known as: MYCOSTATIN   1 application , Topical, Every 12 Hours Scheduled      polyethylene glycol 17 g packet  Commonly known as: MIRALAX   17 g, Oral, Daily      vitamin D3 125 MCG (5000 UT) capsule capsule   5,000 Units, Oral, Daily             Continue These Medications        Instructions Start Date   carbidopa-levodopa ER  MG per tablet  Commonly known as: SINEMET CR   1 tablet, Oral, 3 Times Daily      carboxymethylcellulose 0.5 % solution  Commonly known as: REFRESH PLUS   1 drop, Both Eyes, 2 Times Daily      colesevelam 625 MG tablet  Commonly known as: WELCHOL   625 mg, Oral, 2 Times Daily With Meals      dutasteride 0.5 MG capsule  Commonly known as: AVODART   0.5 mg, Oral, Daily      erythromycin 5 MG/GM ophthalmic ointment  Commonly known as: ROMYCIN   1 application , Both Eyes, Nightly      furosemide 40 MG tablet  Commonly known as: LASIX   40 mg, Oral, Daily      isosorbide mononitrate 60 MG 24 hr tablet  Commonly known as: IMDUR   60 mg, Oral, Daily      levothyroxine 75 MCG tablet  Commonly known as: SYNTHROID, LEVOTHROID   75 mcg, Oral, Daily      metoprolol succinate XL 50 MG 24 hr tablet  Commonly known as: TOPROL-XL   50 mg, Oral, Daily      nitroglycerin 0.4 MG SL tablet  Commonly known as: NITROSTAT   0.4 mg, Sublingual, Every 5 Minutes PRN, Take no more than 3 doses in 15 minutes.      Nuplazid 34 MG capsule  Generic drug: Pimavanserin Tartrate   1 capsule, Oral, Nightly      rosuvastatin 10 MG tablet  Commonly known as: CRESTOR   10 mg, Oral, Daily      tamsulosin 0.4 MG capsule 24 hr capsule  Commonly known as: FLOMAX   1 capsule, Oral, Daily             Stop These Medications      potassium chloride 10 MEQ CR tablet              No Known Allergies      Discharge Disposition:    Skilled Nursing Facility (DC - External)    Diet:  Hospital:  Diet Order   Procedures    Diet: Regular/House Diet; Feeding Assistance - Nursing; Texture: Mechanical Ground (NDD 2); Fluid Consistency: Thin (IDDSI 0)         Discharge Activity:   Activity Instructions    As tolerated.             CODE STATUS:  Code Status and Medical Interventions:   Ordered at: 09/19/23 165     Medical Intervention Limits:    NO intubation (DNI)     Level Of Support Discussed With:    Health Care Surrogate     Code Status (Patient has no pulse and is not breathing):    No CPR (Do Not Attempt to Resuscitate)     Medical Interventions (Patient has pulse or is breathing):    Limited Support         No future appointments.        Time spent on Discharge including face to face service:  35  minutes    This patient has been examined wearing appropriate Personal Protective Equipment 09/26/23      Signature: Electronically signed by Miriam Jasso MD, 09/26/23, 15:24 EDT.  [facility] Hospitalist Team

## 2023-09-26 NOTE — SIGNIFICANT NOTE
Case Management/Social Work    Patient Name:  Jermaine Black  YOB: 1926  MRN: [id number]  Admit Date:  9/19/2023 09/26/23 1453   Post Acute Pre-Cert Documentation   Date Post Acute Pre-Cert Completed 09/26/23   Post Acute Pre-Cert Initiated Comment LILLY verified SNF precert approval via [employer]. Authorization ID [id number]. Valid 9/26-10/1. Approval letter indexed to media. CM made aware.           Electronically signed by:  Nir Mari CMA  09/26/23 14:55 EDT    Nir Mari  Case Management Associate  [address] [address]  P: [phone]  F: [phone]

## 2023-09-26 NOTE — PROGRESS NOTES
Enter Query Response Below      Query Response: Chronic illness related moderate protein calorie malnutrition              If applicable, please update the problem list.     Patient: Jermaine Black        : 1926  Account: [id number]           Admit Date:         How to Respond to this query:       a. Click New Note     b. Answer query within the yellow box.                c. Update the Problem List, if applicable.      If you have any questions about this query contact me at: [email or web address]     Dr. Jasso,    Patient with history of COPD admitted  following a fall. Notes include fatigue and malaise. Moderate malnutrition listed in the Active Hospital Problem list. Registered dietitian evaluated patient on  and found chronic disease - related moderate malnutrition based on the following criteria: moderate loss of muscle mass with severe muscle mass loss in the temple, clavicle bone, patellar, anterior thigh, and posterior calf regions, and moderate muscle loss in the acromion bone, scapular bone, and dorsal hand regions; moderate fat loss in the orbital, upper arm, thoracic, and lumbar regions; and mild/1+ pitting edema. Treatment recommendations include Boost Plus BID.    Please clarify diagnosis treated/monitored:    - Chronic illness related moderate protein calorie malnutrition  - Other ___________________  - Unable to clinically determine    By submitting this query, we are merely seeking further clarification of documentation to accurately reflect all conditions that you are monitoring, evaluating, treating or that extend the hospitalization or utilize additional resources of care. Please utilize your independent clinical judgment when addressing the question(s) above.     This query and your response, once completed, will be entered into the legal medical record.    Sincerely,  Jose David Tavarez RN, CDS  Clinical Documentation Integrity Program

## 2023-09-26 NOTE — PLAN OF CARE
Goal Outcome Evaluation:  Plan of Care Reviewed With: patient     Problem: Adult Inpatient Plan of Care  Goal: Plan of Care Review  Outcome: Ongoing, Progressing  Flowsheets (Taken 9/26/2023 1346)  Progress: improving        Progress: improving             DISPLAY PLAN FREE TEXT

## 2023-09-26 NOTE — PROGRESS NOTES
CARDIOLOGY PROGRESS NOTE:    Jermaine Black  89 y.o.  male  11/30/1926  [id number]      Referring Provider: Hospitalist    Reason for follow-up: Pneumonia     Patient Care Team:  Provider, No Known as PCP - General    Subjective .  Patient still has some shortness of breath    Objective lying in bed comfortably     Review of Systems   Unable to perform ROS: Mental status change     Allergies: Patient has no known allergies.    Scheduled Meds:aspirin, 81 mg, Oral, Daily  budesonide-formoterol, 2 puff, Inhalation, BID - RT  carbidopa-levodopa ER, 1 tablet, Oral, TID  enoxaparin, 40 mg, Subcutaneous, Q24H  erythromycin, 1 application , Both Eyes, Nightly  finasteride, 5 mg, Oral, Daily  furosemide, 40 mg, Oral, Daily  isosorbide mononitrate, 60 mg, Oral, Daily  levothyroxine, 75 mcg, Oral, Daily With Breakfast  metoprolol succinate XL, 50 mg, Oral, Daily  nystatin, 1 application , Topical, Q12H  polyethylene glycol, 17 g, Oral, Daily  rosuvastatin, 10 mg, Oral, Daily  senna-docusate sodium, 1 tablet, Oral, BID  tamsulosin, 0.4 mg, Oral, Daily  vitamin D3, 5,000 Units, Oral, Daily      Continuous Infusions:Pharmacy to Dose enoxaparin (LOVENOX),       PRN Meds:.  acetaminophen    albuterol sulfate HFA    benzonatate    Calcium Replacement - Follow Nurse / BPA Driven Protocol    hydrALAZINE    Magnesium Standard Dose Replacement - Follow Nurse / BPA Driven Protocol    nitroglycerin    ondansetron    Pharmacy to Dose enoxaparin (LOVENOX)    Phosphorus Replacement - Follow Nurse / BPA Driven Protocol    Potassium Replacement - Follow Nurse / BPA Driven Protocol        VITAL SIGNS  Vitals:    09/26/23 0720 09/26/23 1052 09/26/23 1054 09/26/23 1107   BP: 138/80   97/49   BP Location: Right arm   Right arm   Patient Position: Lying   Lying   Pulse: 71 70 73 79   Resp: 21 20 20 21   Temp: 98.2 °F (36.8 °C)   96.8 °F (36 °C)   TempSrc: Oral   Axillary   SpO2: 97% 97% 96% 96%   Weight:       Height:           Flowsheet Rows   "    Flowsheet Row First Filed Value   Admission Height 167.6 cm (66\") Documented at 09/19/2023 0540   Admission Weight 72.6 kg (160 lb) Documented at 09/19/2023 0540             TELEMETRY: Atrial fibrillation with ventricular pacemaker in backup mode    Physical Exam:  Constitutional:       Appearance: Well-developed.   Eyes:      General: No scleral icterus.     Conjunctiva/sclera: Conjunctivae normal.   HENT:      Head: Normocephalic and atraumatic.   Neck:      Vascular: No carotid bruit or JVD.   Pulmonary:      Effort: Pulmonary effort is normal.      Breath sounds: Normal breath sounds. No wheezing. No rales.   Cardiovascular:      Normal rate. Regular rhythm.   Pulses:     Intact distal pulses.   Abdominal:      General: Bowel sounds are normal.      Palpations: Abdomen is soft.   Musculoskeletal:      Cervical back: Normal range of motion and neck supple. Skin:     General: Skin is warm and dry.      Findings: No rash.   Neurological:      Mental Status: Alert.        Results Review:   I reviewed the patient's new clinical results.  Lab Results (last 24 hours)       Procedure Component Value Units Date/Time    Magnesium [[id number]]  (Normal) Collected: 09/26/23 0317    Specimen: Blood Updated: 09/26/23 0459     Magnesium 2.1 mg/dL     Comprehensive Metabolic Panel [[id number]]  (Abnormal) Collected: 09/26/23 0317    Specimen: Blood Updated: 09/26/23 0459     Glucose 89 mg/dL      BUN 17 mg/dL      Creatinine 0.90 mg/dL      Sodium 139 mmol/L      Potassium 4.0 mmol/L      Comment: Slight hemolysis detected by analyzer. Results may be affected.        Chloride 99 mmol/L      CO2 32.0 mmol/L      Calcium 8.3 mg/dL      Total Protein 5.5 g/dL      Albumin 2.8 g/dL      ALT (SGPT) 8 U/L      AST (SGOT) 30 U/L      Comment: Slight hemolysis detected by analyzer. Results may be affected.        Alkaline Phosphatase 49 U/L      Total Bilirubin 0.4 mg/dL      Globulin 2.7 gm/dL      A/G Ratio 1.0 g/dL      " BUN/Creatinine Ratio 18.9     Anion Gap 8.0 mmol/L      eGFR 78.2 mL/min/1.73     Narrative:      GFR Normal >60  Chronic Kidney Disease <60  Kidney Failure <15    The GFR formula is only valid for adults with stable renal function between ages 18 and 70.    Phosphorus [[id number]]  (Normal) Collected: 09/26/23 0317    Specimen: Blood Updated: 09/26/23 0457     Phosphorus 3.4 mg/dL     CBC & Differential [[id number]]  (Abnormal) Collected: 09/26/23 0317    Specimen: Blood Updated: 09/26/23 0423    Narrative:      The following orders were created for panel order CBC & Differential.  Procedure                               Abnormality         Status                     ---------                               -----------         ------                     CBC Auto Differential[[id number]]        Abnormal            Final result                 Please view results for these tests on the individual orders.    CBC Auto Differential [[id number]]  (Abnormal) Collected: 09/26/23 0317    Specimen: Blood Updated: 09/26/23 0423     WBC 6.60 10*3/mm3      RBC 3.94 10*6/mm3      Hemoglobin 12.7 g/dL      Hematocrit 37.8 %      MCV 96.1 fL      MCH 32.3 pg      MCHC 33.7 g/dL      RDW 14.2 %      RDW-SD 50.3 fl      MPV 9.8 fL      Platelets 121 10*3/mm3      Neutrophil % 76.2 %      Lymphocyte % 11.7 %      Monocyte % 10.7 %      Eosinophil % 1.3 %      Basophil % 0.1 %      Neutrophils, Absolute 5.10 10*3/mm3      Lymphocytes, Absolute 0.80 10*3/mm3      Monocytes, Absolute 0.70 10*3/mm3      Eosinophils, Absolute 0.10 10*3/mm3      Basophils, Absolute 0.00 10*3/mm3      nRBC 0.1 /100 WBC             Imaging Results (Last 24 Hours)       ** No results found for the last 24 hours. **            EKG      I personally viewed and interpreted the patient's EKG/Telemetry data:    ECHOCARDIOGRAM:  Results for orders placed during the hospital encounter of 09/19/23    Adult Transthoracic Echo Limited W/ Cont if Necessary Per  Protocol    Interpretation Summary    Left ventricular ejection fraction appears to be 46 - 50%.    Severe aortic valve stenosis is present.       STRESS MYOVIEW:       CARDIAC CATHETERIZATION:  No results found for this or any previous visit.       OTHER:     Atrial fibrillation  Patient has new onset atrial fibrillation and is having some falls but no dizziness or syncope  Patient's rate is well controlled on beta-blockers, will continue the same dose  Patient's family is not able to have discussed with him and he was not interested in anticoagulation but I will place him on aspirin at least.  Patient has high risk of falls secondary to Parkinson disease and his age.  Discussed with patient's daughter and they agree not to place him on any anticoagulants other than aspirin  Patient's heart rate is well controlled    History of pacemaker placement  Patient had a pacemaker which is working very well and will follow in the office.  Patient's pacemaker will be interrogated to see for how long if the A-fib has lasted or started in the past.  Patient's pacemaker will be interrogated by the primary cardiologist at discharge     Severe aortic stenosis  Patient had an echocardiogram which showed severe aortic stenosis with mild LV dysfunction EF of 45 to 50%  Because of his age will continue conservative therapy only     Congestive heart failure  Patient has congestive heart failure with midrange LV ejection fraction EF of 45 to 50% and is currently on beta-blockers  If patient's blood pressure is stable then I will put him on low-dose ACE inhibitors  Patient will follow the primary care cardiologist in the office     Hyperlipidemia  Patient is currently on statins     Hypertension  Patient blood pressure currently stable on isosorbide and beta-blockers     Coronary disease  Patient has history of coronary disease in the past and is followed by a cardiologist in the past but no documentation available but is currently  stable without any angina and is on beta-blockers and nitrates and will be on aspirin also     History of Parkinson disease  Patient has history of Parkinson disease and is currently taking medicines and it is 1 reason also why he should not be on any anticoagulation    I discussed at length with his daughter who is the POA and explained to him that he will be on aspirin only and not any blood thinners because of his risk of falls especially with his age and also Parkinson disease and she understands.    I discussed the patients findings and my recommendations with patient and nurse    Fili Colón MD  09/26/23  11:48 EDT

## 2023-09-26 NOTE — TELEPHONE ENCOUNTER
Pt's SIN Ramos states her dad is in patient at [facility] and she was talking to Dr Colón earlier today. She was told to call back regarding some info on his pacemaker. She states pt's afib is not new, pt was having afib as far back as 1 year ago according to readings from pt's pacemaker. Pacemaker is made by [employer]. Please call her back if needed at [phone].

## 2023-09-26 NOTE — PROGRESS NOTES
Daily Progress Note          Assessment    Acute hypoxic respiratory insufficiency  COVID-19  Atypical pneumonia  COPD  CHF  BPH  HTN  Parkinson's disease   Hypothyroidism  GERD  CAD   Results for orders placed during the hospital encounter of 09/19/23    Adult Transthoracic Echo Limited W/ Cont if Necessary Per Protocol    Interpretation Summary    Left ventricular ejection fraction appears to be 46 - 50%.    Severe aortic valve stenosis is present.       PLAN:    Oxygen titration currently on 2 L   antibiotics: Rocephin changed to Ceftin , completed    Dexamethasone completed    Bronchodilators  Inhaled corticosteroids  Incentive spirometer  Electrolytes/ glycemic control  Thyroid hormone replacement  BP control  Crestor  Diuresis  DVT  prophylaxis-Lovenox            LOS: 7 days     Subjective     Afebrile, patient reports  cough but no shortness of breath    Objective     Vital signs for last 24 hours:  Vitals:    09/25/23 2038 09/25/23 2241 09/26/23 0317 09/26/23 0720   BP: 130/81 130/77 140/77 138/80   BP Location: Right arm Right arm Right arm Right arm   Patient Position: Lying Lying Lying Lying   Pulse: 74 75 72 71   Resp: 18 22 20 21   Temp: 98.6 °F (37 °C) 97.4 °F (36.3 °C) 98 °F (36.7 °C) 98.2 °F (36.8 °C)   TempSrc: Oral Axillary Axillary Oral   SpO2: 97% 96% 97% 97%   Weight:   71 kg (156 lb 8.4 oz)    Height:           Intake/Output last 3 shifts:  I/O last 3 completed shifts:  In: 1080 [P.O.:1080]  Out: 500 [Urine:500]  Intake/Output this shift:  I/O this shift:  In: 240 [P.O.:240]  Out: -       Radiology  Imaging Results (Last 24 Hours)       ** No results found for the last 24 hours. **            Labs:  Results from last 7 days   Lab Units 09/26/23  0317   WBC 10*3/mm3 6.60   HEMOGLOBIN g/dL 12.7*   HEMATOCRIT % 37.8   PLATELETS 10*3/mm3 121*       Results from last 7 days   Lab Units 09/26/23  0317   SODIUM mmol/L 139   POTASSIUM mmol/L 4.0   CHLORIDE mmol/L 99   CO2 mmol/L 32.0*   BUN mg/dL 17    CREATININE mg/dL 0.90   CALCIUM mg/dL 8.3   BILIRUBIN mg/dL 0.4   ALK PHOS U/L 49   ALT (SGPT) U/L 8   AST (SGOT) U/L 30   GLUCOSE mg/dL 89           Results from last 7 days   Lab Units 09/26/23  0317   ALBUMIN g/dL 2.8*     Results from last 7 days   Lab Units 09/19/23 2011 09/19/23  0931   HSTROP T ng/L 116* 66*           Results from last 7 days   Lab Units 09/26/23  0317   MAGNESIUM mg/dL 2.1                   Meds:   SCHEDULE  aspirin, 81 mg, Oral, Daily  budesonide-formoterol, 2 puff, Inhalation, BID - RT  carbidopa-levodopa ER, 1 tablet, Oral, TID  enoxaparin, 40 mg, Subcutaneous, Q24H  erythromycin, 1 application , Both Eyes, Nightly  finasteride, 5 mg, Oral, Daily  furosemide, 40 mg, Oral, Daily  isosorbide mononitrate, 60 mg, Oral, Daily  levothyroxine, 75 mcg, Oral, Daily With Breakfast  metoprolol succinate XL, 50 mg, Oral, Daily  nystatin, 1 application , Topical, Q12H  polyethylene glycol, 17 g, Oral, Daily  rosuvastatin, 10 mg, Oral, Daily  senna-docusate sodium, 1 tablet, Oral, BID  tamsulosin, 0.4 mg, Oral, Daily  vitamin D3, 5,000 Units, Oral, Daily      Infusions  Pharmacy to Dose enoxaparin (LOVENOX),       PRNs    acetaminophen    albuterol sulfate HFA    benzonatate    Calcium Replacement - Follow Nurse / BPA Driven Protocol    hydrALAZINE    Magnesium Standard Dose Replacement - Follow Nurse / BPA Driven Protocol    nitroglycerin    ondansetron    Pharmacy to Dose enoxaparin (LOVENOX)    Phosphorus Replacement - Follow Nurse / BPA Driven Protocol    Potassium Replacement - Follow Nurse / BPA Driven Protocol    Physical Exam:  General Appearance:  Alert   HEENT:  Normocephalic, without obvious abnormality, L mild erythema and dryness in conjunctiva,   nares normal, no drainage     Neck:  Supple, symmetrical, trachea midline.   Lungs /Chest wall:   Mild bilateral basal rhonchi, respirations unlabored, symmetrical wall movement.     Heart:  Regular rate and rhythm, S1 S2 normal  Abdomen:  Soft, non-tender, no masses, no organomegaly.    Extremities: No edema, no clubbing or cyanosis     ROS  Constitutional: Negative for chills, fever and positive for malaise/fatigue.   HENT: Negative.    Eyes: Negative.    Cardiovascular: Negative.    Respiratory: Positive for cough and negative for shortness of breath.    Skin: Negative.    Musculoskeletal: Negative.    Gastrointestinal: Negative.    Genitourinary: Negative.    Neurological: Generalized weakness      I reviewed the recent clinical results    Part of this note may be an electronic transcription/translation of spoken language to printed text using the Dragon Dictation System.

## 2023-09-26 NOTE — THERAPY TREATMENT NOTE
Acute Care - Speech Language Pathology   Swallow Treatment Note  Rai     Patient Name: Jermaine Black  : 1926  MRN: [id number]  Today's Date: 2023               Admit Date: 2023    Visit Dx:     ICD-10-CM ICD-9-CM   1. Generalized weakness  R53.1 780.79   2. Fall, initial encounter  W19.XXXA E888.9   3. COVID  U07.1 079.89   4. Elevated troponin  R77.8 790.6   5. Acute on chronic congestive heart failure, unspecified heart failure type  I50.9 428.0     Patient Active Problem List   Diagnosis    Syncope    Parkinson's disease    HTN (hypertension)    HLD (hyperlipidemia)    CAD (coronary artery disease)    CHF (congestive heart failure)    Dehydration    COVID-19 virus detected    COVID-19    Moderate malnutrition     History reviewed. No pertinent past medical history.  History reviewed. No pertinent surgical history.    SLP Recommendation and Plan  SLP Swallowing Diagnosis: oral dysphagia, R/O pharyngeal dysphagia (23)  SLP Diet Recommendation: mechanical ground textures, thin liquids (23)  Recommended Precautions and Strategies: upright posture during/after eating, small bites of food and sips of liquid, general aspiration precautions, fatigue precautions, assist with feeding (23)  SLP Rec. for Method of Medication Administration: meds whole, meds crushed, with puree (23 1000)     Monitor for Signs of Aspiration: notify SLP if any concerns (23)  Recommended Diagnostics: reassess via clinical swallow evaluation (23)  Swallow Criteria for Skilled Therapeutic Interventions Met: demonstrates skilled criteria (23)     Rehab Potential/Prognosis, Swallowing: good, to achieve stated therapy goals (23)  Therapy Frequency (Swallow): PRN (23)  Predicted Duration Therapy Intervention (Days): until discharge (23)  Oral Care Recommendations: Oral Care BID/PRN, Oral Care before breakfast, after meals  and PRN, Denture Care (09/26/23 1000)                                      Oral Care Recommendations: Oral Care BID/PRN, Oral Care before breakfast, after meals and PRN, Denture Care (09/26/23 1000)           SWALLOW EVALUATION (last 72 hours)       SLP Adult Swallow Evaluation       Row Name 09/26/23 1000       Rehab Evaluation    Document Type therapy note (daily note)  -EC    Subjective Information no complaints  -EC    Patient Observations alert;cooperative;lethargic;agree to therapy  -EC    Patient Effort good  -EC    Comment Assessment completed this date for diet tolerance and possible diet upgrade. Pt sitting up in bed upon entry consuming breakfast w/feeding assistance from CNA. Pt consumes biscuits and gravy w/extended but functional mastication and no s/s of aspiration demonstrated. Pt draws thin liquid via straw w/no overt s/s of aspiration. Pt accepts regular solid peanut butter cracker trials from clinician w/significantly prolonged mastication and oral stasis requiring multiple liquid washes. When asked if pt eats regular solids at baseline pt reports he cannot remember. Mechanical ground appears most appropriate at this time d/t oral stage deficits and fatigue. ST to continue to follow for tolerance, confirmation of baseline and trials for possible upgrade/return to baseline diet as appropriate.  -EC    Symptoms Noted During/After Treatment fatigue  -EC       General Information    Patient Profile Reviewed yes  -EC       SLP Evaluation Clinical Impression    SLP Swallowing Diagnosis oral dysphagia;R/O pharyngeal dysphagia  -EC    Functional Impact risk of aspiration/pneumonia;risk of malnutrition  -EC    Rehab Potential/Prognosis, Swallowing good, to achieve stated therapy goals  -EC    Swallow Criteria for Skilled Therapeutic Interventions Met demonstrates skilled criteria  -EC       Recommendations    Therapy Frequency (Swallow) PRN  -EC    Predicted Duration Therapy Intervention (Days) until  discharge  -EC    SLP Diet Recommendation mechanical ground textures;thin liquids  -EC    Recommended Diagnostics reassess via clinical swallow evaluation  -EC    Recommended Precautions and Strategies upright posture during/after eating;small bites of food and sips of liquid;general aspiration precautions;fatigue precautions;assist with feeding  -EC    Oral Care Recommendations Oral Care BID/PRN;Oral Care before breakfast, after meals and PRN;Denture Care  -EC    SLP Rec. for Method of Medication Administration meds whole;meds crushed;with puree  -EC    Monitor for Signs of Aspiration notify SLP if any concerns  -EC       Swallow Goals (SLP)    Swallow LTGs Swallow Long Term Goal (free text)  -EC    Swallow STGs diet tolerance goal selection (SLP)  -EC    Diet Tolerance Goal Selection (SLP) Swallow Short Term Goal 1;Swallow Short Term Goal 2  -EC       (LTG) Swallow    (LTG) Swallow The patient will maximize swallow function for least restrictive po diet, exhibiting no complications associated with dysphagia, adequate po intake, and demonstrating independent use of safe swallow compensations.  -EC    Time Frame (Swallow Long Term Goal) by discharge  -EC    Progress/Outcomes (Swallow Long Term Goal) good progress toward goal;goal ongoing  -EC       (STG) Swallow 1    (STG) Swallow 1 The patient will participate in a follow up assessment to determine safety and adequacy of recommended diet, independent use of safe swallow compensations, and additional goals/recommendations to follow  -EC    Time Frame (Swallow Short Term Goal 1) 1 week  -EC    Progress/Outcomes (Swallow Short Term Goal 1) goal met  -EC       (STG) Swallow 2    (STG) Swallow 2 Patient will participate in ongoing assessment of swallow, including reevaluation clinically and/or including instrumental assessment of swallow if indicated, to further assess swallow function and return to oral nutrition  -EC    Time Frame (Swallow Short Term Goal 2) 1 week   -EC    Progress/Outcomes (Swallow Short Term Goal 2) good progress toward goal;goal ongoing  -EC              User Key  (r) = Recorded By, (t) = Taken By, (c) = Cosigned By      Initials Name Effective Dates    Gerri Chavarria 06/16/21 -                     EDUCATION  The patient has been educated in the following areas:   Dysphagia (Swallowing Impairment).        SLP GOALS       Row Name 09/26/23 1000             (LTG) Swallow    (LTG) Swallow The patient will maximize swallow function for least restrictive po diet, exhibiting no complications associated with dysphagia, adequate po intake, and demonstrating independent use of safe swallow compensations.  -EC      Time Frame (Swallow Long Term Goal) by discharge  -EC      Progress/Outcomes (Swallow Long Term Goal) good progress toward goal;goal ongoing  -EC         (STG) Swallow 1    (STG) Swallow 1 The patient will participate in a follow up assessment to determine safety and adequacy of recommended diet, independent use of safe swallow compensations, and additional goals/recommendations to follow  -EC      Time Frame (Swallow Short Term Goal 1) 1 week  -EC      Progress/Outcomes (Swallow Short Term Goal 1) goal met  -EC         (STG) Swallow 2    (STG) Swallow 2 Patient will participate in ongoing assessment of swallow, including reevaluation clinically and/or including instrumental assessment of swallow if indicated, to further assess swallow function and return to oral nutrition  -EC      Time Frame (Swallow Short Term Goal 2) 1 week  -EC      Progress/Outcomes (Swallow Short Term Goal 2) good progress toward goal;goal ongoing  -EC                User Key  (r) = Recorded By, (t) = Taken By, (c) = Cosigned By      Initials Name Provider Type    Gerri Chavarria Speech and Language Pathologist                       Time Calculation:                Gerri Licona  9/26/2023

## 2023-09-26 NOTE — PLAN OF CARE
Goal Outcome Evaluation:  Plan of Care Reviewed With: patient, daughter        Progress: no change          To discharge to SNF this afternoon

## 2023-09-27 NOTE — CASE MANAGEMENT/SOCIAL WORK
Case Management Discharge Note      Final Note: [facility]         Selected Continued Care - Discharged on 9/26/2023 Admission date: 9/19/2023 - Discharge disposition: Skilled Nursing Facility (DC - External)      Destination Coordination complete.      Service Provider Selected Services Address Phone Fax Patient Preferred    [facility] [place] [facility] [address] [place] [address] [place] [address] [phone] --                         Transportation Services  W/C Van: Ronak Centeno    Final Discharge Disposition Code: 03 - skilled nursing facility (SNF)

## 2023-09-27 NOTE — PAYOR COMM NOTE
"This is discharge notification for Jermaine Black   Reference/Auth # [id number]   Pt discharged on 9/26/23    Dottie Harper, RN, BSN  Utilization Review Nurse  [facility]  Direct & confidential phone # [phone]  Fax # [phone]      Jermaine Black (89 y.o. Male)       Date of Birth   11/30/1926    Social Security Number       Address   [address] [place] [address]    Home Phone   [phone]    MRN   [id number]       Judaism   None    Marital Status                               Admission Date   9/19/23    Admission Type   Emergency    Admitting Provider   Rolan Staley MD    Attending Provider       Department, Room/Bed   [facility] 2D, 252/1       Discharge Date   9/26/2023    Discharge Disposition   Skilled Nursing Facility (DC - External)    Discharge Destination   Other                              Attending Provider: (none)   Allergies: No Known Allergies    Isolation: None   Infection: COVID (confirmed) (09/19/23)   Code Status: Prior    Ht: 167.6 cm (66\")   Wt: 71 kg (156 lb 8.4 oz)    Admission Cmt: None   Principal Problem: COVID-19 [U07.1]                   Active Insurance as of 9/19/2023       Primary Coverage       Payor Plan Insurance Group Employer/Plan Group    AETNA MEDICARE REPLACEMENT AETNA MEDICARE REPLACEMENT [id number]       Payor Plan Address Payor Plan Phone Number Payor Plan Fax Number Effective Dates    [address] [phone]  1/1/2022 - None Entered    [facility] [id number]         Subscriber Name Subscriber Birth Date Member ID       JERMAINE BLACK 11/30/1926 [id number]                     Emergency Contacts        (Rel.) Home Phone Work Phone Mobile Phone    Wayne-Rachel Valenzuela (Daughter) -- -- [phone]    Gerri Walker (Daughter) -- -- [phone]    Skylar Rutledge (Daughter) [phone] -- --                 Discharge Summary        Miriam Jasso MD at 09/26/23 4011                       [facility] " Hospital Medicine Services  Discharge Summary    Date of Service: 23   Patient Name: Jermaine Black  : 1926  MRN: [id number]    Date of Admission: 2023  Discharge Diagnosis: Covid   Date of Discharge:  23   Primary Care Physician: Provider, No Known      Presenting Problem:   Elevated troponin [R77.8]  Generalized weakness [R53.1]  Fall, initial encounter [W19.XXXA]  Acute on chronic congestive heart failure, unspecified heart failure type [I50.9]  COVID [U07.1]  COVID-19 [U07.1]    Active and Resolved Hospital Problems:  Active Hospital Problems    Diagnosis POA    **COVID-19 [U07.1] Yes    Moderate malnutrition [E44.0] Yes    COVID-19 virus detected [U07.1] Yes      Resolved Hospital Problems   No resolved problems to display.         Hospital Course     Hospital Course:  Jermaine Black is a 89 y.o. male with a history of COPD, CHF with unknown EF, Parkinson's Disease, Hypothyroidism, CAD, Hyperlipidemia, GERD, BPH, and HTN who came to the ER after a fall on the morning of admission. Patient states that his legs gave out and he fell onto his knees but did not hit his head. He further states he has had a cough for the past 3 weeks but no sputum production. In the ER patient noted with COVID-19 with atypical pneumonia as well as possible CHF exacerbation with elevated BNP.  Hypoxic requiring 2 to 3 L nasal cannula, started on dexamethasone and Lasix and pulmonology also consulted. Patient has been clinically stable. Cardio is planning to get the pacemaker to [facility] in 2 to 3 weeks as outpatient.  Will need to follow-up with Dr. Stoddard.         DISCHARGE Follow Up Recommendations for labs and diagnostics:  Follow up Dr stoddard       Reasons For Change In Medications and Indications for New Medications:      Day of Discharge     Vital Signs:  Temp:  [96.8 °F (36 °C)-98.6 °F (37 °C)] 96.8 °F (36 °C)  Heart Rate:  [70-79] 79  Resp:  [15-22] 21  BP: ()/(49-81) 97/49    Physical  Exam:  HENT:      Head: Normocephalic and atraumatic.      Nose: Nose normal.   Eyes:      Extraocular Movements: Extraocular movements intact.      Conjunctiva/sclera: Conjunctivae normal.      Pupils: Pupils are equal, round, and reactive to light.   Cardiovascular:      Rate and Rhythm: normal       Pulses: Normal pulses.      Heart sounds: Normal heart sounds.   Pulmonary:      Effort: normal      Breath sounds: normal   Abdominal:      General: Abdomen is flat. Bowel sounds are normal.      Palpations: Abdomen is soft.   Musculoskeletal:         General: Normal range of motion.      Cervical back: Normal range of motion and neck supple.    Psychiatric:         Mood and Affect: AAO 1, confused         Pertinent  and/or Most Recent Results     LAB RESULTS:      Lab 09/26/23  0317 09/25/23  0214 09/24/23  0021 09/23/23  0145 09/22/23  0049   WBC 6.60 5.80 4.60 5.80 6.20   HEMOGLOBIN 12.7* 12.9* 11.9* 11.7* 11.7*   HEMATOCRIT 37.8 38.5 34.8* 34.8* 35.4*   PLATELETS 121* 136* 132* 132* 140   NEUTROS ABS 5.10 4.20 3.30 4.30 4.80   LYMPHS ABS 0.80 0.90 0.60* 0.60* 0.50*   MONOS ABS 0.70 0.60 0.70 0.90 0.80   EOS ABS 0.10 0.10 0.00 0.00 0.00   MCV 96.1 96.4 96.1 96.9 97.2*         Lab 09/26/23  0317 09/25/23  0214 09/24/23  0021 09/23/23  0145 09/22/23  0049   SODIUM 139 140 140 141 137   POTASSIUM 4.0 4.2 4.0 4.0 3.9   CHLORIDE 99 98 98 99 97*   CO2 32.0* 37.0* 34.0* 35.0* 35.0*   ANION GAP 8.0 5.0 8.0 7.0 5.0   BUN 17 24* 28* 29* 29*   CREATININE 0.90 1.04 1.00 1.05 1.08   EGFR 78.2 65.7 68.9 65.0 62.8   GLUCOSE 89 106* 132* 139* 149*   CALCIUM 8.3 8.7 8.4 8.0* 8.0*   MAGNESIUM 2.1  --   --   --   --    PHOSPHORUS 3.4  --   --   --   --          Lab 09/26/23  0317   TOTAL PROTEIN 5.5*   ALBUMIN 2.8*   GLOBULIN 2.7   ALT (SGPT) 8   AST (SGOT) 30   BILIRUBIN 0.4   ALK PHOS 49         Lab 09/19/23 2011   HSTROP T 116*                 Brief Urine Lab Results  (Last result in the past 365 days)        Color   Clarity    Blood   Leuk Est   Nitrite   Protein   CREAT   Urine HCG        09/19/23 0809 Yellow   Clear   Moderate (2+)   Negative   Negative   Negative                 Microbiology Results (last 10 days)       Procedure Component Value - Date/Time    Respiratory Panel PCR w/COVID-19(SARS-CoV-2) KATIE/JIMENA/VIKTORIA/PAD/COR/MAD/LEOLA In-House, NP Swab in UTM/VTM, 3-4 HR TAT - Swab, Nasopharynx [[id number]]  (Abnormal) Collected: 09/19/23 0730    Lab Status: Final result Specimen: Swab from Nasopharynx Updated: 09/19/23 0830     ADENOVIRUS, PCR Not Detected     Coronavirus 229E Not Detected     Coronavirus HKU1 Not Detected     Coronavirus NL63 Not Detected     Coronavirus OC43 Not Detected     COVID19 Detected     Human Metapneumovirus Not Detected     Human Rhinovirus/Enterovirus Not Detected     Influenza A PCR Not Detected     Influenza B PCR Not Detected     Parainfluenza Virus 1 Not Detected     Parainfluenza Virus 2 Not Detected     Parainfluenza Virus 3 Not Detected     Parainfluenza Virus 4 Not Detected     RSV, PCR Not Detected     Bordetella pertussis pcr Not Detected     Bordetella parapertussis PCR Not Detected     Chlamydophila pneumoniae PCR Not Detected     Mycoplasma pneumo by PCR Not Detected    Narrative:      In the setting of a positive respiratory panel with a viral infection PLUS a negative procalcitonin without other underlying concern for bacterial infection, consider observing off antibiotics or discontinuation of antibiotics and continue supportive care. If the respiratory panel is positive for atypical bacterial infection (Bordetella pertussis, Chlamydophila pneumoniae, or Mycoplasma pneumoniae), consider antibiotic de-escalation to target atypical bacterial infection.            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 Electronically Signed: Erik Baer MD  9/19/2023 6:27 AM EDT  Workstation ID: [id number]    CT Head  Without Contrast    Result Date: 9/19/2023  Impression: Impression: 1.No acute intracranial abnormality. 2.Moderate chronic small vessel ischemic change. Electronically Signed: Erik Baer MD  9/19/2023 6:56 AM EDT  Workstation ID: [id number]    CT Head Without Contrast    Result Date: 8/31/2023  Impression: Impression: 1.Left frontal lobe hyperdensity is essentially unchanged from prior studies. Given the lack of evolution, this most likely represents a calcification rather than an intracranial hemorrhage. No definite intracranial hemorrhage. 2.Unchanged right frontal convexity low density fluid collection measuring 1.2 cm. Electronically Signed: Chemo Vargas,   8/31/2023 6:31 PM EDT  Workstation ID: [id number]    CT Head Without Contrast    Result Date: 8/31/2023  Impression: Impression: 1. Stable 3-4 mm subtle hyperdensity adjacent to the left superior frontal gyrus. In the absence of trauma, this may represent a parenchymal calcification. Otherwise, this still could represent a small focus of subarachnoid hemorrhage. There do not appear to be significant external signs of trauma to the head. Consider 24-hour follow-up head CT. 2. Moderate chronic small vessel ischemic change and moderate generalized parenchymal volume loss. Electronically Signed: Erik Baer MD  8/31/2023 12:18 AM EDT  Workstation ID: [id number]    CT Head Without Contrast    Result Date: 8/30/2023  Impression: Impression: 1.Subtle hyperdensity in the left frontal lobe may represent calcifications, although a trace subarachnoid hemorrhage is not completely excluded. Please consider repeat CT in 4 to 6 hours to document stability. 2.Low-density fluid along the right frontal convexity, measuring 1.2 cm in thickness, is concerning for an old subdural hemorrhage versus subdural hygroma. Electronically Signed: Chemo Vargas,   8/30/2023 7:15 PM EDT  Workstation ID: [id number]    XR Chest 1 View    Result Date: 9/19/2023  Impression:  Impression: Mixed interstitial and airspace disease throughout the right lung and at the left lung base which may relate to pulmonary edema or multifocal atypical/viral pneumonia. Electronically Signed: Farzad Moss MD  9/19/2023 7:43 AM EDT  Workstation ID: [id number]    XR Chest 1 View    Result Date: 8/30/2023  Impression: Impression: Low lung volumes accentuates pulmonary vasculature and cardiomediastinal silhouette. No definite acute cardiopulmonary abnormality. Electronically Signed: Chemo Vargas DO  8/30/2023 5:50 PM EDT  Workstation ID: [id number]    CT Angiogram Chest Pulmonary Embolism    Result Date: 9/20/2023  Impression: Impression: 1. Negative for pulmonary embolus. 2. Cardiomegaly and pulmonary edema with moderate bilateral pleural effusions right greater than left. 3. Coronary artery and aortic valve calcifications. 4. Reflux of contrast into the hepatic veins suggesting right heart insufficiency. 5. Additional chronic findings above. Electronically Signed: Farzad Moss MD  9/20/2023 12:26 PM EDT  Workstation ID: [id number]             Results for orders placed during the hospital encounter of 09/19/23    Adult Transthoracic Echo Limited W/ Cont if Necessary Per Protocol    Interpretation Summary    Left ventricular ejection fraction appears to be 46 - 50%.    Severe aortic valve stenosis is present.      Labs Pending at Discharge:      Procedures Performed           Consults:   Consults       Date and Time Order Name Status Description    9/20/2023  3:17 PM Inpatient Cardiology Consult      9/19/2023  2:00 PM Inpatient Pulmonology Consult Completed     9/19/2023 10:13 AM Hospitalist (on-call MD unless specified)      8/31/2023  9:15 AM Inpatient Neurosurgery Consult Completed     8/30/2023  7:33 PM Neurosurgery (on-call MD unless specified) Completed               Discharge Details        Discharge Medications        New Medications        Instructions Start Date   aspirin 81 MG chewable tablet   81  mg, Oral, Daily      budesonide-formoterol 160-4.5 MCG/ACT inhaler  Commonly known as: SYMBICORT   2 puffs, Inhalation, 2 Times Daily - RT      nystatin 922337 UNIT/GM cream  Commonly known as: MYCOSTATIN   1 application , Topical, Every 12 Hours Scheduled      polyethylene glycol 17 g packet  Commonly known as: MIRALAX   17 g, Oral, Daily      vitamin D3 125 MCG (5000 UT) capsule capsule   5,000 Units, Oral, Daily             Continue These Medications        Instructions Start Date   carbidopa-levodopa ER  MG per tablet  Commonly known as: SINEMET CR   1 tablet, Oral, 3 Times Daily      carboxymethylcellulose 0.5 % solution  Commonly known as: REFRESH PLUS   1 drop, Both Eyes, 2 Times Daily      colesevelam 625 MG tablet  Commonly known as: WELCHOL   625 mg, Oral, 2 Times Daily With Meals      dutasteride 0.5 MG capsule  Commonly known as: AVODART   0.5 mg, Oral, Daily      erythromycin 5 MG/GM ophthalmic ointment  Commonly known as: ROMYCIN   1 application , Both Eyes, Nightly      furosemide 40 MG tablet  Commonly known as: LASIX   40 mg, Oral, Daily      isosorbide mononitrate 60 MG 24 hr tablet  Commonly known as: IMDUR   60 mg, Oral, Daily      levothyroxine 75 MCG tablet  Commonly known as: SYNTHROID, LEVOTHROID   75 mcg, Oral, Daily      metoprolol succinate XL 50 MG 24 hr tablet  Commonly known as: TOPROL-XL   50 mg, Oral, Daily      nitroglycerin 0.4 MG SL tablet  Commonly known as: NITROSTAT   0.4 mg, Sublingual, Every 5 Minutes PRN, Take no more than 3 doses in 15 minutes.      Nuplazid 34 MG capsule  Generic drug: Pimavanserin Tartrate   1 capsule, Oral, Nightly      rosuvastatin 10 MG tablet  Commonly known as: CRESTOR   10 mg, Oral, Daily      tamsulosin 0.4 MG capsule 24 hr capsule  Commonly known as: FLOMAX   1 capsule, Oral, Daily             Stop These Medications      potassium chloride 10 MEQ CR tablet              No Known Allergies      Discharge Disposition:   Skilled Nursing Facility  (DC - External)    Diet:  Hospital:  Diet Order   Procedures    Diet: Regular/House Diet; Feeding Assistance - Nursing; Texture: Mechanical Ground (NDD 2); Fluid Consistency: Thin (IDDSI 0)         Discharge Activity:   Activity Instructions    As tolerated.             CODE STATUS:  Code Status and Medical Interventions:   Ordered at: 09/19/23 1655     Medical Intervention Limits:    NO intubation (DNI)     Level Of Support Discussed With:    Health Care Surrogate     Code Status (Patient has no pulse and is not breathing):    No CPR (Do Not Attempt to Resuscitate)     Medical Interventions (Patient has pulse or is breathing):    Limited Support         No future appointments.        Time spent on Discharge including face to face service:  35  minutes    This patient has been examined wearing appropriate Personal Protective Equipment 09/26/23      Signature: Electronically signed by Miriam Jasso MD, 09/26/23, 15:24 EDT.  [facility] Hospitalist Team      Electronically signed by Miriam Jasso MD at 09/26/23 7490
